# Patient Record
Sex: MALE | Race: WHITE | NOT HISPANIC OR LATINO | URBAN - METROPOLITAN AREA
[De-identification: names, ages, dates, MRNs, and addresses within clinical notes are randomized per-mention and may not be internally consistent; named-entity substitution may affect disease eponyms.]

---

## 2017-11-24 ENCOUNTER — OUTPATIENT (OUTPATIENT)
Dept: OUTPATIENT SERVICES | Facility: HOSPITAL | Age: 27
LOS: 1 days | Discharge: HOME | End: 2017-11-24

## 2017-11-27 DIAGNOSIS — F11.20 OPIOID DEPENDENCE, UNCOMPLICATED: ICD-10-CM

## 2018-03-09 ENCOUNTER — EMERGENCY (EMERGENCY)
Facility: HOSPITAL | Age: 28
LOS: 1 days | Discharge: HOME | End: 2018-03-09
Attending: EMERGENCY MEDICINE | Admitting: EMERGENCY MEDICINE
Payer: MEDICAID

## 2018-03-09 VITALS
HEART RATE: 95 BPM | DIASTOLIC BLOOD PRESSURE: 81 MMHG | HEIGHT: 77 IN | WEIGHT: 190.04 LBS | TEMPERATURE: 98 F | RESPIRATION RATE: 16 BRPM | OXYGEN SATURATION: 100 % | SYSTOLIC BLOOD PRESSURE: 145 MMHG

## 2018-03-09 LAB
ALBUMIN SERPL ELPH-MCNC: 4.4 G/DL — SIGNIFICANT CHANGE UP (ref 3.3–5)
ALP SERPL-CCNC: 53 U/L — SIGNIFICANT CHANGE UP (ref 40–120)
ALT FLD-CCNC: 12 U/L RC — SIGNIFICANT CHANGE UP (ref 10–45)
ANION GAP SERPL CALC-SCNC: 12 MMOL/L — SIGNIFICANT CHANGE UP (ref 5–17)
APTT BLD: 29.7 SEC — SIGNIFICANT CHANGE UP (ref 27.5–37.4)
AST SERPL-CCNC: 19 U/L — SIGNIFICANT CHANGE UP (ref 10–40)
BASE EXCESS BLDV CALC-SCNC: 4.5 MMOL/L — HIGH (ref -2–2)
BASOPHILS # BLD AUTO: 0 K/UL — SIGNIFICANT CHANGE UP (ref 0–0.2)
BASOPHILS NFR BLD AUTO: 0.9 % — SIGNIFICANT CHANGE UP (ref 0–2)
BILIRUB SERPL-MCNC: 0.4 MG/DL — SIGNIFICANT CHANGE UP (ref 0.2–1.2)
BUN SERPL-MCNC: 15 MG/DL — SIGNIFICANT CHANGE UP (ref 7–23)
CA-I SERPL-SCNC: 1.23 MMOL/L — SIGNIFICANT CHANGE UP (ref 1.12–1.3)
CALCIUM SERPL-MCNC: 9.7 MG/DL — SIGNIFICANT CHANGE UP (ref 8.4–10.5)
CHLORIDE BLDV-SCNC: 106 MMOL/L — SIGNIFICANT CHANGE UP (ref 96–108)
CHLORIDE SERPL-SCNC: 101 MMOL/L — SIGNIFICANT CHANGE UP (ref 96–108)
CO2 BLDV-SCNC: 34 MMOL/L — HIGH (ref 22–30)
CO2 SERPL-SCNC: 28 MMOL/L — SIGNIFICANT CHANGE UP (ref 22–31)
CREAT SERPL-MCNC: 0.99 MG/DL — SIGNIFICANT CHANGE UP (ref 0.5–1.3)
EOSINOPHIL # BLD AUTO: 0.3 K/UL — SIGNIFICANT CHANGE UP (ref 0–0.5)
EOSINOPHIL NFR BLD AUTO: 7.2 % — HIGH (ref 0–6)
GAS PNL BLDV: 135 MMOL/L — LOW (ref 136–145)
GAS PNL BLDV: SIGNIFICANT CHANGE UP
GLUCOSE BLDV-MCNC: 89 MG/DL — SIGNIFICANT CHANGE UP (ref 70–99)
GLUCOSE SERPL-MCNC: 98 MG/DL — SIGNIFICANT CHANGE UP (ref 70–99)
HCO3 BLDV-SCNC: 32 MMOL/L — HIGH (ref 21–29)
HCT VFR BLD CALC: 40.5 % — SIGNIFICANT CHANGE UP (ref 39–50)
HCT VFR BLDA CALC: 42 % — SIGNIFICANT CHANGE UP (ref 39–50)
HGB BLD CALC-MCNC: 13.8 G/DL — SIGNIFICANT CHANGE UP (ref 13–17)
HGB BLD-MCNC: 14.1 G/DL — SIGNIFICANT CHANGE UP (ref 13–17)
INR BLD: 0.98 RATIO — SIGNIFICANT CHANGE UP (ref 0.88–1.16)
LACTATE BLDV-MCNC: 1.1 MMOL/L — SIGNIFICANT CHANGE UP (ref 0.7–2)
LIDOCAIN IGE QN: 19 U/L — SIGNIFICANT CHANGE UP (ref 7–60)
LYMPHOCYTES # BLD AUTO: 1.4 K/UL — SIGNIFICANT CHANGE UP (ref 1–3.3)
LYMPHOCYTES # BLD AUTO: 27.8 % — SIGNIFICANT CHANGE UP (ref 13–44)
MCHC RBC-ENTMCNC: 32.6 PG — SIGNIFICANT CHANGE UP (ref 27–34)
MCHC RBC-ENTMCNC: 34.8 GM/DL — SIGNIFICANT CHANGE UP (ref 32–36)
MCV RBC AUTO: 93.8 FL — SIGNIFICANT CHANGE UP (ref 80–100)
MONOCYTES # BLD AUTO: 0.4 K/UL — SIGNIFICANT CHANGE UP (ref 0–0.9)
MONOCYTES NFR BLD AUTO: 7.4 % — SIGNIFICANT CHANGE UP (ref 2–14)
NEUTROPHILS # BLD AUTO: 2.8 K/UL — SIGNIFICANT CHANGE UP (ref 1.8–7.4)
NEUTROPHILS NFR BLD AUTO: 56.7 % — SIGNIFICANT CHANGE UP (ref 43–77)
PCO2 BLDV: 64 MMHG — HIGH (ref 35–50)
PH BLDV: 7.32 — LOW (ref 7.35–7.45)
PLATELET # BLD AUTO: 235 K/UL — SIGNIFICANT CHANGE UP (ref 150–400)
PO2 BLDV: 18 MMHG — LOW (ref 25–45)
POTASSIUM BLDV-SCNC: 4 MMOL/L — SIGNIFICANT CHANGE UP (ref 3.5–5)
POTASSIUM SERPL-MCNC: 4.3 MMOL/L — SIGNIFICANT CHANGE UP (ref 3.5–5.3)
POTASSIUM SERPL-SCNC: 4.3 MMOL/L — SIGNIFICANT CHANGE UP (ref 3.5–5.3)
PROT SERPL-MCNC: 7.5 G/DL — SIGNIFICANT CHANGE UP (ref 6–8.3)
PROTHROM AB SERPL-ACNC: 10.6 SEC — SIGNIFICANT CHANGE UP (ref 9.8–12.7)
RBC # BLD: 4.32 M/UL — SIGNIFICANT CHANGE UP (ref 4.2–5.8)
RBC # FLD: 10.9 % — SIGNIFICANT CHANGE UP (ref 10.3–14.5)
SAO2 % BLDV: 24 % — LOW (ref 67–88)
SODIUM SERPL-SCNC: 141 MMOL/L — SIGNIFICANT CHANGE UP (ref 135–145)
WBC # BLD: 4.8 K/UL — SIGNIFICANT CHANGE UP (ref 3.8–10.5)
WBC # FLD AUTO: 4.8 K/UL — SIGNIFICANT CHANGE UP (ref 3.8–10.5)

## 2018-03-09 PROCEDURE — 80053 COMPREHEN METABOLIC PANEL: CPT

## 2018-03-09 PROCEDURE — 85730 THROMBOPLASTIN TIME PARTIAL: CPT

## 2018-03-09 PROCEDURE — 82803 BLOOD GASES ANY COMBINATION: CPT

## 2018-03-09 PROCEDURE — 82330 ASSAY OF CALCIUM: CPT

## 2018-03-09 PROCEDURE — 71260 CT THORAX DX C+: CPT | Mod: 26

## 2018-03-09 PROCEDURE — 99284 EMERGENCY DEPT VISIT MOD MDM: CPT

## 2018-03-09 PROCEDURE — 85027 COMPLETE CBC AUTOMATED: CPT

## 2018-03-09 PROCEDURE — 84295 ASSAY OF SERUM SODIUM: CPT

## 2018-03-09 PROCEDURE — 74177 CT ABD & PELVIS W/CONTRAST: CPT | Mod: 26

## 2018-03-09 PROCEDURE — 82565 ASSAY OF CREATININE: CPT

## 2018-03-09 PROCEDURE — 85014 HEMATOCRIT: CPT

## 2018-03-09 PROCEDURE — 74177 CT ABD & PELVIS W/CONTRAST: CPT

## 2018-03-09 PROCEDURE — 85610 PROTHROMBIN TIME: CPT

## 2018-03-09 PROCEDURE — 96374 THER/PROPH/DIAG INJ IV PUSH: CPT | Mod: XU

## 2018-03-09 PROCEDURE — 99284 EMERGENCY DEPT VISIT MOD MDM: CPT | Mod: 25

## 2018-03-09 PROCEDURE — 71260 CT THORAX DX C+: CPT

## 2018-03-09 PROCEDURE — 83605 ASSAY OF LACTIC ACID: CPT

## 2018-03-09 PROCEDURE — 83690 ASSAY OF LIPASE: CPT

## 2018-03-09 PROCEDURE — 82947 ASSAY GLUCOSE BLOOD QUANT: CPT

## 2018-03-09 PROCEDURE — 84132 ASSAY OF SERUM POTASSIUM: CPT

## 2018-03-09 PROCEDURE — 82435 ASSAY OF BLOOD CHLORIDE: CPT

## 2018-03-09 RX ORDER — LIDOCAINE 4 G/100G
1 CREAM TOPICAL ONCE
Qty: 0 | Refills: 0 | Status: COMPLETED | OUTPATIENT
Start: 2018-03-09 | End: 2018-03-09

## 2018-03-09 RX ORDER — ACETAMINOPHEN 500 MG
1000 TABLET ORAL ONCE
Qty: 0 | Refills: 0 | Status: COMPLETED | OUTPATIENT
Start: 2018-03-09 | End: 2018-03-09

## 2018-03-09 RX ORDER — SODIUM CHLORIDE 9 MG/ML
1000 INJECTION INTRAMUSCULAR; INTRAVENOUS; SUBCUTANEOUS ONCE
Qty: 0 | Refills: 0 | Status: COMPLETED | OUTPATIENT
Start: 2018-03-09 | End: 2018-03-09

## 2018-03-09 RX ADMIN — LIDOCAINE 1 PATCH: 4 CREAM TOPICAL at 18:46

## 2018-03-09 RX ADMIN — Medication 1000 MILLIGRAM(S): at 18:29

## 2018-03-09 RX ADMIN — SODIUM CHLORIDE 1000 MILLILITER(S): 9 INJECTION INTRAMUSCULAR; INTRAVENOUS; SUBCUTANEOUS at 18:20

## 2018-03-09 RX ADMIN — Medication 400 MILLIGRAM(S): at 18:20

## 2018-03-09 RX ADMIN — LIDOCAINE 1 PATCH: 4 CREAM TOPICAL at 19:26

## 2018-03-09 NOTE — ED PROVIDER NOTE - PHYSICAL EXAMINATION
NAD, VSS, Afebrile, No facial or scalp tender, Known unequal pupils with RRL. No spinal mid tender, + Right posterior rib tender without obvious swelling or lesion, No CVA tender, + Right sided ABD tender, Neuro- intact with steady gait. FUll ROMs of hips without tender.

## 2018-03-09 NOTE — ED ADULT NURSE NOTE - OBJECTIVE STATEMENT
28 y.o M presents to the ED c/o s/p fall two weeks ago. Pt states he was standing on railing outside of house trying to clean out awning and lost balance and fell on R. side of body, about 8 feet. Pt denies LOC/hitting his head and denies feeling dizzy prior to fall. Pt went to urgent care twice, most recently 2 days ago, xray was done and was told he has rib fracture. Pt states urgent care sent him hear for MRI to rule out any traumatic injury Pt states he has R. sided pain, 6/10, to torso and states he has trouble breathing due to pain. Pt took Advil and Tylenol prior to arrival. Pt presents A+OX4, ambulatory, well in appearance, airway intact, no work of breathing noted, l 28 y.o M presents to the ED c/o s/p fall two weeks ago. Pt states he was standing on railing outside of house trying to clean out awning and lost balance and fell on R. side of body, about 8 feet. Pt denies LOC/hitting his head and denies feeling dizzy prior to fall. Pt went to urgent care twice, most recently 2 days ago, xray was done and was told he has rib fracture. Pt states urgent care sent him hear for MRI to rule out any traumatic injury Pt states he has R. sided pain, 6/10, to torso and states he has trouble breathing due to pain. Pt took Advil and Tylenol prior to arrival. Pt presents A+OX4, ambulatory, well in appearance, airway intact, no work of breathing noted, lung sounds clear bilaterally, gross neuro intact, no obvious deformity/bleeding/ecchymosis to right side of body, pt denies numbness/tingling to extremities, ha, n/v, dizziness. MD at bedside for eval.

## 2018-03-09 NOTE — ED PROVIDER NOTE - ATTENDING CONTRIBUTION TO CARE
29 yo M p/w right flank chest and abdominal pain s/p fall from 8 feet; xray at urgent care showing 9th rib fracture; on exam in NAD, lungs CTABL, Cardiac: s1/s2 no MGR, abd soft, TTP in RLQ.  R flank/lateral chest wall tenderness.   No splinting or respiratory distress, hemodynamically stable.  CT chest/abdomen obtained, showing only singular rigth 9th rib fracture, no other acute injury; patient given incentive spirometer and instructed on use, stable for dc with nsaids, outpatient f/u.

## 2018-03-09 NOTE — ED PROVIDER NOTE - OBJECTIVE STATEMENT
27yo male pt, no PMHx c/o right sided rib/ abd pain s/p fall 2weeks ago. Pt stated he was standing on railing outside of house trying to clean out awning and lost balance and fell/sliding down on right sided rib from the railing, about 8 feet. Pt's evaluated by UC twice (2/28 and 3/7). 27yo male pt, no PMHx c/o right sided rib/ abd pain s/p fall 2weeks ago. Pt stated he was standing on railing outside of house trying to clean out awning and lost balance and fell/sliding down on right sided rib from the railing, about 8 feet. Pt's evaluated by UC twice (2/28 and 3/7). Pt stated the pain's worsen with intermittent SOB. Denies head injury or LOC. Denies headache, neck or low back pain. Denies N/V/D. Denies sensory changes or weakness to extremities. Denies fever, chills or cough. 29yo male pt, no PMHx c/o right sided rib/ abd pain s/p fall 2weeks ago. Pt stated he was standing on railing outside of house trying to clean out awning and lost balance and fell/sliding down on right sided rib from the railing, about 8 feet. Pt's evaluated by UC twice (2/28 and 3/7). Pt stated the pain's worsen with intermittent SOB. Denies head injury or LOC. Denies headache, neck or low back pain. Denies N/V/D. Denies sensory changes or weakness to extremities. Denies urinary problems or hematuria. Denies fever, chills or cough.

## 2018-03-09 NOTE — ED PROVIDER NOTE - MEDICAL DECISION MAKING DETAILS
27 yo M p/w right flank chest and abdominal pain s/p fall from 8 feet; xray at urgent care showing 9th rib fracture  Plan: CT chest/abdomen

## 2022-07-12 ENCOUNTER — INPATIENT (INPATIENT)
Facility: HOSPITAL | Age: 32
LOS: 9 days | Discharge: HOME | End: 2022-07-22
Attending: PSYCHIATRY & NEUROLOGY | Admitting: PSYCHIATRY & NEUROLOGY

## 2022-07-12 VITALS
OXYGEN SATURATION: 98 % | RESPIRATION RATE: 18 BRPM | DIASTOLIC BLOOD PRESSURE: 100 MMHG | TEMPERATURE: 99 F | HEIGHT: 77 IN | SYSTOLIC BLOOD PRESSURE: 190 MMHG | HEART RATE: 80 BPM

## 2022-07-12 DIAGNOSIS — F29 UNSPECIFIED PSYCHOSIS NOT DUE TO A SUBSTANCE OR KNOWN PHYSIOLOGICAL CONDITION: ICD-10-CM

## 2022-07-12 DIAGNOSIS — F12.10 CANNABIS ABUSE, UNCOMPLICATED: ICD-10-CM

## 2022-07-12 LAB
ALBUMIN SERPL ELPH-MCNC: 4.8 G/DL — SIGNIFICANT CHANGE UP (ref 3.3–5)
ALP SERPL-CCNC: 69 U/L — SIGNIFICANT CHANGE UP (ref 40–120)
ALT FLD-CCNC: 12 U/L — SIGNIFICANT CHANGE UP (ref 4–41)
AMPHET UR-MCNC: NEGATIVE — SIGNIFICANT CHANGE UP
ANION GAP SERPL CALC-SCNC: 12 MMOL/L — SIGNIFICANT CHANGE UP (ref 7–14)
APPEARANCE UR: CLEAR — SIGNIFICANT CHANGE UP
AST SERPL-CCNC: 16 U/L — SIGNIFICANT CHANGE UP (ref 4–40)
BARBITURATES UR SCN-MCNC: NEGATIVE — SIGNIFICANT CHANGE UP
BASOPHILS # BLD AUTO: 0.03 K/UL — SIGNIFICANT CHANGE UP (ref 0–0.2)
BASOPHILS NFR BLD AUTO: 0.4 % — SIGNIFICANT CHANGE UP (ref 0–2)
BENZODIAZ UR-MCNC: POSITIVE
BILIRUB SERPL-MCNC: 0.6 MG/DL — SIGNIFICANT CHANGE UP (ref 0.2–1.2)
BILIRUB UR-MCNC: NEGATIVE — SIGNIFICANT CHANGE UP
BUN SERPL-MCNC: 10 MG/DL — SIGNIFICANT CHANGE UP (ref 7–23)
CALCIUM SERPL-MCNC: 10.1 MG/DL — SIGNIFICANT CHANGE UP (ref 8.4–10.5)
CHLORIDE SERPL-SCNC: 104 MMOL/L — SIGNIFICANT CHANGE UP (ref 98–107)
CO2 SERPL-SCNC: 25 MMOL/L — SIGNIFICANT CHANGE UP (ref 22–31)
COCAINE METAB.OTHER UR-MCNC: NEGATIVE — SIGNIFICANT CHANGE UP
COLOR SPEC: YELLOW — SIGNIFICANT CHANGE UP
CREAT SERPL-MCNC: 0.81 MG/DL — SIGNIFICANT CHANGE UP (ref 0.5–1.3)
CREATININE URINE RESULT, DAU: 368 MG/DL — SIGNIFICANT CHANGE UP
DIFF PNL FLD: NEGATIVE — SIGNIFICANT CHANGE UP
EGFR: 119 ML/MIN/1.73M2 — SIGNIFICANT CHANGE UP
EOSINOPHIL # BLD AUTO: 0.02 K/UL — SIGNIFICANT CHANGE UP (ref 0–0.5)
EOSINOPHIL NFR BLD AUTO: 0.3 % — SIGNIFICANT CHANGE UP (ref 0–6)
GLUCOSE SERPL-MCNC: 131 MG/DL — HIGH (ref 70–99)
GLUCOSE UR QL: NEGATIVE — SIGNIFICANT CHANGE UP
HCT VFR BLD CALC: 42.9 % — SIGNIFICANT CHANGE UP (ref 39–50)
HGB BLD-MCNC: 14.5 G/DL — SIGNIFICANT CHANGE UP (ref 13–17)
IANC: 5.48 K/UL — SIGNIFICANT CHANGE UP (ref 1.8–7.4)
IMM GRANULOCYTES NFR BLD AUTO: 0.1 % — SIGNIFICANT CHANGE UP (ref 0–1.5)
KETONES UR-MCNC: ABNORMAL
LEUKOCYTE ESTERASE UR-ACNC: NEGATIVE — SIGNIFICANT CHANGE UP
LYMPHOCYTES # BLD AUTO: 1.22 K/UL — SIGNIFICANT CHANGE UP (ref 1–3.3)
LYMPHOCYTES # BLD AUTO: 17.1 % — SIGNIFICANT CHANGE UP (ref 13–44)
MAGNESIUM SERPL-MCNC: 2.1 MG/DL — SIGNIFICANT CHANGE UP (ref 1.6–2.6)
MCHC RBC-ENTMCNC: 30.9 PG — SIGNIFICANT CHANGE UP (ref 27–34)
MCHC RBC-ENTMCNC: 33.8 GM/DL — SIGNIFICANT CHANGE UP (ref 32–36)
MCV RBC AUTO: 91.3 FL — SIGNIFICANT CHANGE UP (ref 80–100)
METHADONE UR-MCNC: NEGATIVE — SIGNIFICANT CHANGE UP
MONOCYTES # BLD AUTO: 0.38 K/UL — SIGNIFICANT CHANGE UP (ref 0–0.9)
MONOCYTES NFR BLD AUTO: 5.3 % — SIGNIFICANT CHANGE UP (ref 2–14)
NEUTROPHILS # BLD AUTO: 5.48 K/UL — SIGNIFICANT CHANGE UP (ref 1.8–7.4)
NEUTROPHILS NFR BLD AUTO: 76.8 % — SIGNIFICANT CHANGE UP (ref 43–77)
NITRITE UR-MCNC: NEGATIVE — SIGNIFICANT CHANGE UP
NRBC # BLD: 0 /100 WBCS — SIGNIFICANT CHANGE UP
NRBC # FLD: 0 K/UL — SIGNIFICANT CHANGE UP
OPIATES UR-MCNC: NEGATIVE — SIGNIFICANT CHANGE UP
OXYCODONE UR-MCNC: NEGATIVE — SIGNIFICANT CHANGE UP
PCP SPEC-MCNC: SIGNIFICANT CHANGE UP
PCP UR-MCNC: NEGATIVE — SIGNIFICANT CHANGE UP
PH UR: 6.5 — SIGNIFICANT CHANGE UP (ref 5–8)
PLATELET # BLD AUTO: 315 K/UL — SIGNIFICANT CHANGE UP (ref 150–400)
POTASSIUM SERPL-MCNC: 4 MMOL/L — SIGNIFICANT CHANGE UP (ref 3.5–5.3)
POTASSIUM SERPL-SCNC: 4 MMOL/L — SIGNIFICANT CHANGE UP (ref 3.5–5.3)
PROT SERPL-MCNC: 7.5 G/DL — SIGNIFICANT CHANGE UP (ref 6–8.3)
PROT UR-MCNC: ABNORMAL
RBC # BLD: 4.7 M/UL — SIGNIFICANT CHANGE UP (ref 4.2–5.8)
RBC # FLD: 12.6 % — SIGNIFICANT CHANGE UP (ref 10.3–14.5)
SODIUM SERPL-SCNC: 141 MMOL/L — SIGNIFICANT CHANGE UP (ref 135–145)
SP GR SPEC: 1.03 — SIGNIFICANT CHANGE UP (ref 1–1.05)
THC UR QL: POSITIVE
TOXICOLOGY SCREEN, DRUGS OF ABUSE, SERUM RESULT: SIGNIFICANT CHANGE UP
TSH SERPL-MCNC: 0.88 UIU/ML — SIGNIFICANT CHANGE UP (ref 0.27–4.2)
UROBILINOGEN FLD QL: SIGNIFICANT CHANGE UP
WBC # BLD: 7.14 K/UL — SIGNIFICANT CHANGE UP (ref 3.8–10.5)
WBC # FLD AUTO: 7.14 K/UL — SIGNIFICANT CHANGE UP (ref 3.8–10.5)

## 2022-07-12 PROCEDURE — 93010 ELECTROCARDIOGRAM REPORT: CPT

## 2022-07-12 PROCEDURE — 99285 EMERGENCY DEPT VISIT HI MDM: CPT | Mod: 25

## 2022-07-12 RX ORDER — MIDAZOLAM HYDROCHLORIDE 1 MG/ML
5 INJECTION, SOLUTION INTRAMUSCULAR; INTRAVENOUS ONCE
Refills: 0 | Status: DISCONTINUED | OUTPATIENT
Start: 2022-07-12 | End: 2022-07-12

## 2022-07-12 RX ADMIN — MIDAZOLAM HYDROCHLORIDE 5 MILLIGRAM(S): 1 INJECTION, SOLUTION INTRAMUSCULAR; INTRAVENOUS at 17:45

## 2022-07-12 NOTE — ED ADULT TRIAGE NOTE - CHIEF COMPLAINT QUOTE
Pt brought in by EMS from home for psych eval, has been having scattered thoughts. Pt denies SI/HI. Pt takes opioids daily, has not been had any in 3 days. Pt states he took Suboxone 14 hours ago. Pt noted to have tremors
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
## 2022-07-12 NOTE — ED PROVIDER NOTE - NS ED ROS FT
Gen: Denies fever.   HEENT: Denies headache. Denies congestion.  CV: Denies chest pain. Denies lightheadedness.  Skin: Denies rash.   Resp: Denies SOB. Denies cough.  GI: Denies nausea. Denies vomiting. Denies diarrhea. Denies melena. Denies hematochezia.  Msk: Denies extremity swelling. Denies extremity pain.  : Denies dysuria. Denies hematuria.  Neuro: Denies LOC. Denies dizziness. Denies new numbness/tingling. Denies new focal weakness.  Psych: Denies SI

## 2022-07-12 NOTE — ED PROVIDER NOTE - PROGRESS NOTE DETAILS
Caleb Gordon, DO: was assisting in ambay when pt began to become acutely agitated and lunging at pts and staff. did not respond to verbal de-escalation. required restraint by security and iv meds. pt still agitated after however improved. no s/s moderation sedation. will go to main ED asap. O'Aubrey DO PGY-3: received sign out on this patient. Mc TALAVERA: Signed out to be by overnight team pending bed placement for [facility]. Patient was signed out already being medically cleared. Mc TALAVERA: Admit to [facility].
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
## 2022-07-12 NOTE — ED BEHAVIORAL HEALTH ASSESSMENT NOTE - RISK ASSESSMENT
Moderate Acute Suicide Risk Pt is at moderate risk of suicide at this time. Static risk factors include hx of substance use, male sex. Dynamic risk factors include current substance use, unemployment, not currently in outpatient treatment, paranoia. Protective factors include supportive family, residential stability, no current SI.
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
## 2022-07-12 NOTE — ED BEHAVIORAL HEALTH ASSESSMENT NOTE - SUMMARY
The patient is a 32 year old man, domiciled with mother in a private residence, unemployed, hx of substance use (reports suboxone use, Percocet weekly), hx of seeing a psychiatrist (1-2 years ago) though none currently, not previously on medications, denies any inpatient psychiatric admissions, denies hx of SAs/SIB who presents via EMS for bizarre behavior. On initial assessment, pt with paranoid delusions, impaired reasoning, poor insight. Denies current SI/HI/AVH. UDS positive for benzos (though likely taken after Versed given) and THC. At this time, pt's presentation likely 2/2 underlying substance-induced psychotic disorder vs. primary psychotic disorder. Pt appears unable to keep himself safe at this time and will require inpatient psychiatric admission.    Plan:  -Legal: Admit 9.27  -Psychiatric: Start standing risperidone 1 mg qhs for psychosis. PRNs: Haldol 5/Ativan 2 mg PO/IV/IM q6h for agitation  -Medical: none, pending CT scan, COVID   -I/G/m therapy on unit when appropriate The patient is a 32 year old man, domiciled with mother in a private residence, unemployed, hx of substance use (reports suboxone use, Percocet weekly), hx of seeing a psychiatrist (1-2 years ago) though none currently, not previously on medications, denies any inpatient psychiatric admissions, denies hx of SAs/SIB who presents via EMS for bizarre behavior. On initial assessment, pt with paranoid delusions, impaired reasoning, poor insight. Denies current SI/HI/AVH. UDS positive for benzos (though likely taken after Versed given) and THC. At this time, pt's presentation likely 2/2 underlying substance-induced psychotic disorder vs. primary psychotic disorder. Pt appears unable to keep himself safe at this time and will require inpatient psychiatric admission.    Plan:  -Legal: Admit 9.27  -Psychiatric: Start standing risperidone 1 mg qhs for psychosis. Start symptom-triggered CIWA (unknown recent alcohol use) and CINA protocol due to recent opiate use.  PRNs: Haldol 5/Ativan 2 mg PO/IV/IM q6h for agitation  -Medical: none, pending head CT scan, COVID pending. The patient is a 32 year old man, domiciled with mother in a private residence, unemployed, hx of substance use (reports suboxone use, Percocet weekly), hx of seeing a psychiatrist (1-2 years ago) though none currently, not previously on medications, denies any inpatient psychiatric admissions, denies hx of SAs/SIB who presents via EMS for bizarre behavior. On initial assessment, pt with paranoid delusions, impaired reasoning, poor insight. Denies current SI/HI/AVH. UDS positive for benzos (though likely taken after Versed given) and THC. At this time, pt's presentation likely 2/2 underlying substance-induced psychotic disorder vs. primary psychotic disorder. Pt appears unable to keep himself safe at this time and will require inpatient psychiatric admission.    Plan:  -Legal: Admit 9.27  -Psychiatric: Start standing risperidone 1 mg qhs for psychosis. Start symptom-triggered CIWA (unknown recent alcohol use) and CINA protocol due to recent opiate use.  PRNs: Haldol 5/Ativan 2 mg PO/IV/IM q6h for severe agitation. Atarax 25 mg PO q6h for anxiety. Would minimize benzodiazepine use given substance use hx.   -Medical: none, pending head CT scan, COVID pending. The patient is a 32 year old man, domiciled with mother in a private residence, unemployed, hx of substance use (reports suboxone use, Percocet weekly), hx of seeing a psychiatrist (1-2 years ago) though none currently, not previously on medications, denies any inpatient psychiatric admissions, denies hx of SAs/SIB who presents via EMS for bizarre behavior. On initial assessment, pt with paranoid delusions, impaired reasoning, poor insight. Denies current SI/HI/AVH. UDS positive for benzos (though likely taken after Versed given) and THC. At this time, pt's presentation likely 2/2 underlying substance-induced psychotic disorder vs. primary psychotic disorder. Pt appears unable to keep himself safe at this time and will require inpatient psychiatric admission.    Plan:  -Legal: Admit 9.27  -Psychiatric: Start standing risperidone 1 mg qhs for psychosis. Start symptom-triggered CIWA (unknown recent alcohol use) and CINA protocol due to recent opiate use.  PRNs: Haldol 5/Ativan 2 mg PO/IV/IM q6h for severe agitation. Atarax 25 mg PO q6h for anxiety. Would minimize benzodiazepine use given substance use hx. The patient is a 32 year old man, domiciled with mother in a private residence, unemployed, hx of substance use (reports suboxone use, Percocet weekly), hx of seeing a psychiatrist (1-2 years ago) though none currently, not previously on medications, denies any inpatient psychiatric admissions, denies hx of SAs/SIB who presents via EMS for bizarre behavior.     On initial assessment, pt with paranoid delusions, impaired reasoning, poor insight. Denies current SI/HI/AVH. UDS positive for benzos (though likely taken after Versed given) and THC. At this time, pt's presentation likely 2/2 underlying substance-induced psychotic disorder vs. primary psychotic disorder. Pt appears unable to keep himself safe at this time and will require inpatient psychiatric admission.    Plan:  -Legal: Admit 9.27  -Psychiatric: Start standing risperidone 1 mg qhs for psychosis. Start symptom-triggered CIWA (unknown recent alcohol use) and CINA protocol due to recent opiate use.  PRNs: Haldol 5/Ativan 2 mg PO/IV/IM q6h for severe agitation. Atarax 25 mg PO q6h for anxiety. Would minimize benzodiazepine use given substance use hx.
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
## 2022-07-12 NOTE — ED BEHAVIORAL HEALTH ASSESSMENT NOTE - PSYCHIATRIC ISSUES AND PLAN (INCLUDE STANDING AND PRN MEDICATION)
Start risperidone 1 mg qhs for psychosis. PRNs: Haldol 5/Ativan 2 mg PO/IM/IV q6h Start risperidone 1 mg qhs for psychosis. PRNs: Haldol 5/Ativan 2 mg PO/IM/IV q6h. Atarax 25 mg PO for anxiety PRN Start risperidone 1 mg qhs for psychosis. Monitor QTc < 500 on EKG. PRNs: Haldol 5/Ativan 2 mg PO/IM/IV q6h. Atarax 25 mg PO for anxiety PRN

## 2022-07-12 NOTE — ED PROVIDER NOTE - PHYSICAL EXAMINATION
Gen: A&Ox4   HEENT: Atraumatic. Mucous membranes moist, no scleral icterus.  CV: RRR. No significant lower extremity edema.   Resp: Respirations unlabored. CTAB, no rales, no wheezes.  GI: Abdomen non tender to palpation, soft and non-distended.   Skin/MSK: No open wounds. No ecchymosis appreciated. No evidence of thoracic trauma - non ttp throughout and no bruising/deformity. Spine non ttp in midline throughout. Extremities w/o wounds/ecchymosis/deformity x 4. Distal pulses 2+ throughout.   Neuro: Following commands. EOMI. Pupils ERRL.  Psych: Appropriate mood, cooperative

## 2022-07-12 NOTE — ED BEHAVIORAL HEALTH ASSESSMENT NOTE - DESCRIPTION
none domiciled at home with mother, unemployed Pt agitated, requiring Versed 5 mg IV push.    Vital Signs Last 24 Hrs  T(C): 37.1 (12 Jul 2022 21:16), Max: 37.1 (12 Jul 2022 14:24)  T(F): 98.8 (12 Jul 2022 21:16), Max: 98.8 (12 Jul 2022 21:16)  HR: 77 (12 Jul 2022 21:16) (77 - 98)  BP: 123/80 (12 Jul 2022 21:16) (111/77 - 190/100)  BP(mean): --  RR: 16 (12 Jul 2022 21:16) (16 - 18)  SpO2: 100% (12 Jul 2022 21:16) (98% - 100%)    Parameters below as of 12 Jul 2022 21:16  Patient On (Oxygen Delivery Method): room air                              14.5   7.14  )-----------( 315      ( 12 Jul 2022 15:48 )             42.9

## 2022-07-12 NOTE — ED BEHAVIORAL HEALTH ASSESSMENT NOTE - OTHER PAST PSYCHIATRIC HISTORY (INCLUDE DETAILS REGARDING ONSET, COURSE OF ILLNESS, INPATIENT/OUTPATIENT TREATMENT)
Pt reports once being brought to the ED for alcohol use  He states he saw a psychiatrist 1-2 years ago  never on psychiatric medications

## 2022-07-12 NOTE — ED BEHAVIORAL HEALTH NOTE - BEHAVIORAL HEALTH NOTE
COVID Exposure Screen- Patient  1.	*Have you had a COVID-19 test in the last 90 days?  (  ) Yes   ( x ) No   (  ) Unknown- Reason: _____  IF YES PROCEED TO QUESTION #2. IF NO OR UNKNOWN, PLEASE SKIP TO QUESTION #3.  2.	Date of test(s) and result(s): ________  3.	*Have you tested positive for COVID-19 antibodies? (  ) Yes   (  x ) No   (  ) Unknown- Reason: _____  IF YES PROCEED TO QUESTION #4. IF NO or UNKNOWN, PLEASE SKIP TO QUESTION #5.  4.	Date of positive antibody test: ________  5.	*Have you received 2 doses of the COVID-19 vaccine? ( x ) Yes   (  ) No   (  ) Unknown- Reason: _____   IF YES PROCEED TO QUESTION #6. IF NO or UNKNOWN, PLEASE SKIP TO QUESTION #7.  6.	Date of second dose: ________  7.	*In the past 10 days, have you been around anyone with a positive COVID-19 test?* (  ) Yes   ( x ) No   (  ) Unknown- Reason: ____  IF YES PROCEED TO QUESTION #8. IF NO or UNKNOWN, PLEASE SKIP TO QUESTION #13.  8.	Were you within 6 feet of them for at least 15 minutes? (  ) Yes   (  ) No   (  ) Unknown- Reason: _____  9.	Have you provided care for them? (  ) Yes   (  ) No   (  ) Unknown- Reason: ______  10.	Have you had direct physical contact with them (touched, hugged, or kissed them)? (  ) Yes   (  ) No    (  ) Unknown- Reason: _____  11.	Have you shared eating or drinking utensils with them? (  ) Yes   (  ) No    (  ) Unknown- Reason: ____  12.	Have they sneezed, coughed, or somehow gotten respiratory droplets on you? (  ) Yes   (  ) No    (  ) Unknown- Reason: ______  13.	*Have you been out of [place] within the past 10 days?* (  ) Yes   ( x ) No   (  ) Unknown- Reason: _____  IF YES PLEASE ANSWER THE FOLLOWING QUESTIONS:  14.	Which state/country have you been to? ______  15.	Were you there over 24 hours? (  ) Yes   (  ) No    (  ) Unknown- Reason: ______  16.	Date of return to [facility]: ______

## 2022-07-12 NOTE — ED BEHAVIORAL HEALTH ASSESSMENT NOTE - NSBHATTESTCOMMENTATTENDFT_PSY_A_CORE
The patient is a 32 year old man, domiciled with mother in a private residence, unemployed, hx of substance use (reports suboxone use, Percocet weekly), hx of seeing a psychiatrist (1-2 years ago) though none currently, not previously on medications, denies any inpatient psychiatric admissions, denies hx of SAs/SIB who presents via EMS for bizarre behavior.  Pt in the ED, agitated, requiring multiple rounds of PRNs. He is presenting with symptoms of paranoia, possible AH and disorganized thought process. Pt at this time cannot care for himself, is not functioning at his baseline and will require psych hospitalization for stabilization.
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
## 2022-07-12 NOTE — ED PROVIDER NOTE - CLINICAL SUMMARY MEDICAL DECISION MAKING FREE TEXT BOX
33 yo M PMHx of multiple head injuries, opiate dependence (percocet), on Suboxone, presenting to ED via EMS for psychiatric disturbance. Sister reporting strange behavior w/ pt crawling on floor and not making sense. Pt reporting scattered thoughts and recurrence of dream, prompting his interest in being evaluated in the ER. He reports marijuana use and Percocet use regularly, no other substance abuse per pt. Pt describing recent trip to [place] during interview, but timeline for this unclear. He denies hx of psychiatric illness. Denies SI/HI, VH/AH. PMHx limited 2/2 to tangential thought processes and perseverance on his treatment at time of ED arrival. Exam as above. Concern for psychosis, substance induced vs primary psychiatric disorder. Consider metabolic etiology. Psychiatric screening labs pending. Pt appearing paranoid and mildly agitated but compliant w/ examination. S/p versed on arrival due to agitation. Will reassess after screen diagnostics.

## 2022-07-12 NOTE — ED PROVIDER NOTE - ATTENDING CONTRIBUTION TO CARE
I have personally performed a face to face medical and diagnostic evaluation of the patient. I have discussed with and reviewed the Resident's note and agree with the History, ROS, Physical Exam and MDM unless otherwise indicated. A brief summary of my personal evaluation and impression can be found below.    33 yo M PMHx of multiple head injuries, opiate dependence (percocet), on Suboxone, presenting to ED via EMS for psychiatric disturbance. Collateral gathered from sister - states that he normally lives with their mother - mother currently in [place]. She lives across the street, pt called her and wasn't making any sense. She went over - could see inside through locked screen door and he was hunched over/crawling saying things about emily implanting things in his abdomen. She has not seen him this bad before. Pt currently endorses "vivid" dreams and states that people sutured things in to his abdomen. Unable to provide any further information.     On exam, VSS w no signs of trauma on exam, soft nontender abd. Incoherent statements during exam. Will obtain psych clearance labs and head CT. Will consult psych.     Reassess to dispo.     Jordan Moody, ED Attending

## 2022-07-13 DIAGNOSIS — F33.9 MAJOR DEPRESSIVE DISORDER, RECURRENT, UNSPECIFIED: ICD-10-CM

## 2022-07-13 LAB — SARS-COV-2 RNA SPEC QL NAA+PROBE: SIGNIFICANT CHANGE UP

## 2022-07-13 PROCEDURE — 99222 1ST HOSP IP/OBS MODERATE 55: CPT | Mod: GC

## 2022-07-13 PROCEDURE — 70450 CT HEAD/BRAIN W/O DYE: CPT | Mod: 26,MA

## 2022-07-13 RX ORDER — DIPHENHYDRAMINE HCL 50 MG
50 CAPSULE ORAL ONCE
Refills: 0 | Status: DISCONTINUED | OUTPATIENT
Start: 2022-07-13 | End: 2022-07-14

## 2022-07-13 RX ORDER — HALOPERIDOL DECANOATE 100 MG/ML
5 INJECTION INTRAMUSCULAR EVERY 4 HOURS
Refills: 0 | Status: DISCONTINUED | OUTPATIENT
Start: 2022-07-13 | End: 2022-07-14

## 2022-07-13 RX ORDER — HYDROXYZINE HCL 10 MG
50 TABLET ORAL EVERY 4 HOURS
Refills: 0 | Status: DISCONTINUED | OUTPATIENT
Start: 2022-07-13 | End: 2022-07-14

## 2022-07-13 RX ORDER — NICOTINE POLACRILEX 2 MG
4 GUM BUCCAL
Refills: 0 | Status: DISCONTINUED | OUTPATIENT
Start: 2022-07-13 | End: 2022-07-19

## 2022-07-13 RX ORDER — DIPHENHYDRAMINE HCL 50 MG
50 CAPSULE ORAL EVERY 4 HOURS
Refills: 0 | Status: DISCONTINUED | OUTPATIENT
Start: 2022-07-13 | End: 2022-07-14

## 2022-07-13 RX ORDER — RISPERIDONE 4 MG/1
1 TABLET ORAL AT BEDTIME
Refills: 0 | Status: DISCONTINUED | OUTPATIENT
Start: 2022-07-13 | End: 2022-07-14

## 2022-07-13 RX ORDER — HALOPERIDOL DECANOATE 100 MG/ML
5 INJECTION INTRAMUSCULAR ONCE
Refills: 0 | Status: DISCONTINUED | OUTPATIENT
Start: 2022-07-13 | End: 2022-07-14

## 2022-07-13 RX ORDER — LOPERAMIDE HCL 2 MG
2 TABLET ORAL EVERY 4 HOURS
Refills: 0 | Status: DISCONTINUED | OUTPATIENT
Start: 2022-07-13 | End: 2022-07-22

## 2022-07-13 RX ADMIN — Medication 2 MILLIGRAM(S): at 15:28

## 2022-07-13 RX ADMIN — Medication 50 MILLIGRAM(S): at 15:27

## 2022-07-13 RX ADMIN — HALOPERIDOL DECANOATE 5 MILLIGRAM(S): 100 INJECTION INTRAMUSCULAR at 22:16

## 2022-07-13 RX ADMIN — Medication 2 MILLIGRAM(S): at 00:40

## 2022-07-13 RX ADMIN — Medication 50 MILLIGRAM(S): at 20:12

## 2022-07-13 RX ADMIN — HALOPERIDOL DECANOATE 5 MILLIGRAM(S): 100 INJECTION INTRAMUSCULAR at 15:28

## 2022-07-13 RX ADMIN — Medication 2 MILLIGRAM(S): at 17:14

## 2022-07-13 RX ADMIN — Medication 2 MILLIGRAM(S): at 09:51

## 2022-07-13 RX ADMIN — RISPERIDONE 1 MILLIGRAM(S): 4 TABLET ORAL at 20:12

## 2022-07-13 RX ADMIN — Medication 50 MILLIGRAM(S): at 22:16

## 2022-07-13 RX ADMIN — Medication 2 MILLIGRAM(S): at 20:13

## 2022-07-13 NOTE — BH CONSULTATION LIAISON PROGRESS NOTE - NSBHATTESTCOMMENTATTENDFT_PSY_A_CORE
The patient is a 32 year old man, domiciled with mother in a private residence, unemployed, hx of substance use (reports suboxone use, Percocet weekly), hx of seeing a psychiatrist (1-2 years ago) though none currently, not previously on medications, denies any inpatient psychiatric admissions, denies hx of SAs/SIB who presents via EMS for bizarre behavior. On initial assessment, pt with paranoid delusions, impaired reasoning, poor insight. Denies current SI/HI/AVH. UDS positive for benzos (though likely taken after Versed given) and THC. At this time, pt's presentation likely 2/2 underlying substance-induced psychotic disorder vs. primary psychotic disorder. Pt appears unable to keep himself safe at this time and will require inpatient psychiatric admission.    On reassessment, patient continues to demonstrate paranoid delusions, impaired reasoning, labile affect, and symptoms concerning for possible mild EtOH w/d, currently managed with symptom-triggered CIWA. Continues to merit inpatient psychiatric hospitalization for inability to care for self.

## 2022-07-13 NOTE — BH INPATIENT PSYCHIATRY ASSESSMENT NOTE - MSE UNSTRUCTURED FT
TT is a cis man wearing hospital gowns. He is adequately groomed. He was cooperative during the interview with good eye contact, but became agitated and anxious at times. He was sitting on his bed comfortably at times and animated at times, swinging his arms and throwing his mask. He speaks at a pressured rate with normative volume. His mood is "bad." Affect is congruent, labile, at times constricted and intense. His thought process exhibits flight of ideas and global disorganization but can speak linearly at times. His thought content was focused on persecutory delusions. Denies SIIP and HIIP. Displayed and described overt paranoia and persecutory fears. He denies AH and VH. Insight is poor. Judgment is limited. Impulse control was appropriate but likely tenuous given overt persecutory fears.

## 2022-07-13 NOTE — BH INPATIENT PSYCHIATRY ASSESSMENT NOTE - NSBHASSESSSUMMFT_PSY_ALL_CORE
The patient is a 32 year old man with a history of polysubstance use disorder (Percocet, Xanax, marijuana) presenting with paranoid psychosis of unknown etiology.     On exam, the patient continues to be disorganized and overtly paranoid. He is highly labile and anxious. Due to inability to care for self and potential threat to the community, he warrants inpatient admission for stabilization. Differential includes manic psychosis, substance-induced psychosis, and first-break schizophrenia.     Plan:  -	continue risperidone 1mg qhs  -	continue symptom-triggered CIWA and CINA  -	continue hydroxyzine 25mg q6h for anxiety The patient is a 32 year old man with a history of polysubstance use disorder (Percocet, Xanax, marijuana) presenting with paranoid psychosis in the context of substance use (THC, percocet, possibly Suboxone, possibly xanax) and lack of outpt treatment.     On exam, the patient continues to be disorganized and overtly paranoid. He is highly labile and anxious. Due to inability to care for self and potential threat to the community, he warrants inpatient admission for stabilization. Aside from severe anxiety and restlessness, pt at this time without any symptoms of EtOH/benzo or opioid withdrawal and vitals are noted to be stable. Differential diagnoses include substance-induced psychosis vs first episode psychosis vs Bipolar I Disorder (current episode manic with psychosis).     Plan:  - Admit to [facility], involuntary 2PC legal status  - Routine checks  - C/w Risperdal 1mg qhs (started in the ED)  - CIWA protocol with prn Ativan  - CINA protocol   - Admission labs and EKG reviewed, within normal limits

## 2022-07-13 NOTE — ED ADULT NURSE REASSESSMENT NOTE - NS ED NURSE REASSESS COMMENT FT1
Pt arrived to  from main ED. Pt wanded for safety, changed into  clothing, personal property collected and logged. Pt irritable, anxious, asking to leave. Pt provided with ativan 2mg po. Pt agreeable to CT head scan. Vitals recorded, pt placed in  room 1, comfort measures provided.

## 2022-07-13 NOTE — ED ADULT NURSE REASSESSMENT NOTE - NS ED NURSE REASSESS COMMENT FT1
Pt remains awake, a&ox3, increasingly anxious, restless, pacing and repeatedly asking to leave ED. Pt medicated as ordered with Ativan 2mg PO. Psychiatrist at bedside for reassessment. Will continue to monitor for safety and therapeutic effect.

## 2022-07-13 NOTE — BH CONSULTATION LIAISON PROGRESS NOTE - NSBHASSESSMENTFT_PSY_ALL_CORE
The patient is a 32 year old man, domiciled with mother in a private residence, unemployed, hx of substance use (reports suboxone use, Percocet weekly), hx of seeing a psychiatrist (1-2 years ago) though none currently, not previously on medications, denies any inpatient psychiatric admissions, denies hx of SAs/SIB who presents via EMS for bizarre behavior. On initial assessment, pt with paranoid delusions, impaired reasoning, poor insight. Denies current SI/HI/AVH. UDS positive for benzos (though likely taken after Versed given) and THC. At this time, pt's presentation likely 2/2 underlying substance-induced psychotic disorder vs. primary psychotic disorder. Pt appears unable to keep himself safe at this time and will require inpatient psychiatric admission.    On reassessment, patient continues to demonstrate paranoid delusions, impaired reasoning, labile affect, and symptoms concerning for possible mild EtOH w/d, currently managed with symptom-triggered CIWA. Continues to merit inpatient psychiatric hospitalization for inability to care for self.    Plan:  -Legal: Admit 9.27  -Psychiatric: Start standing risperidone 1 mg qhs for psychosis. Start symptom-triggered CIWA (unknown recent alcohol use) and CINA protocol due to recent opiate use.  PRNs: Haldol 5/Ativan 2 mg PO/IV/IM q6h for severe agitation. Atarax 25 mg PO q6h for anxiety. Would minimize benzodiazepine use given substance use hx.   -Medical: none, CTHead wnl, COVID (-).

## 2022-07-13 NOTE — BH INPATIENT PSYCHIATRY ASSESSMENT NOTE - NSBHCRANIAL_PSY_ALL_CORE
Recognizes 2 fingers or can read (II)/Normal speech (IX, X, XII)/Hearing intact (VIII) Normal speech (IX, X, XII)/EOMI (III, IV, VI)/Hearing intact (VIII)

## 2022-07-13 NOTE — BH INPATIENT PSYCHIATRY ASSESSMENT NOTE - DETAILS
na n/a patient disorganization and paranoia per ED assessment and collateral from mother pt has in the past threatened to commit suicide, though known hx of suicide attempts

## 2022-07-13 NOTE — BH INPATIENT PSYCHIATRY ASSESSMENT NOTE - NSBHATTESTCOMMENTATTENDFT_PSY_A_CORE
Pt is a 33yo man with hx of polysubstance abuse presenting with symptoms of psychosis and lito in the context of substance use (opioids, THC, possibly xanax) and lack of outpt treatment. Pt presents as extremely anxious with psychotic fears about police and other tracking him down and killing him. Pt accepting of reassurance and medication to ease anxiety. Denies SIIP and HIIP. Aside from anxiety and restlessness, pt at this time without any overt symptoms of EtOH/benzo withdrawal or opioid withdrawal, vitals noted to be stable in the ED. DDx substance-induced psychosis vs primary psychotic disorder. Will start risperdal 1mg qhs and CIWA (symptom-triggered) and CINA protocol. Will expand collateral from family tomorrow.

## 2022-07-13 NOTE — BH CONSULTATION LIAISON PROGRESS NOTE - NSBHCHARTREVIEWVS_PSY_A_CORE FT
Vital Signs Last 24 Hrs  T(C): 36.4 (13 Jul 2022 00:06), Max: 37.1 (12 Jul 2022 14:24)  T(F): 97.6 (13 Jul 2022 00:06), Max: 98.8 (12 Jul 2022 21:16)  HR: 62 (13 Jul 2022 00:06) (62 - 98)  BP: 122/69 (13 Jul 2022 00:06) (111/77 - 190/100)  BP(mean): --  RR: 18 (13 Jul 2022 00:06) (16 - 18)  SpO2: 100% (13 Jul 2022 00:06) (98% - 100%)    Parameters below as of 13 Jul 2022 00:06  Patient On (Oxygen Delivery Method): room air

## 2022-07-13 NOTE — BH PATIENT PROFILE - TOBACCO CESSATION EDUCATION/COUNSELLING(PROVIDED IF TOBACCO USED IN THE PAST 30 DAYS- CORE MEASURE SITES)
Addended by: GWENDOLYN MONDRAGON on: 1/18/2021 02:39 PM     Modules accepted: Orders     Offered and provided

## 2022-07-13 NOTE — BH PATIENT PROFILE - STATED REASON FOR ADMISSION
Pt stated he kept having reoccurring 4 dreams of race cars that were causing a "chemical reaction" for 2-3 days. Pt kept stating "they are following me" and "they are trying to harm me". When asked who "they" are, pt stated "the dreams".

## 2022-07-13 NOTE — BH INPATIENT PSYCHIATRY ASSESSMENT NOTE - RISK ASSESSMENT
Pt is at moderate risk of suicide at this time. Static risk factors include hx of substance use, male sex. Dynamic risk factors include current substance use, unemployment, not currently in outpatient treatment, paranoia. Protective factors include supportive family, residential stability, no current SI. Pt is at moderate risk of suicide at this time. Static risk factors include hx of substance use. Dynamic risk factors include current substance use, unemployment, not currently in outpatient treatment, paranoia. Protective factors include supportive family, residential stability, no current SI.

## 2022-07-13 NOTE — BH INPATIENT PSYCHIATRY ASSESSMENT NOTE - ADDITIONAL DETAILS ALL
na n/a per ED assessment and collateral from mother pt has in the past threatened to commit suicide, though known hx of suicide attempts

## 2022-07-13 NOTE — BH CONSULTATION LIAISON PROGRESS NOTE - NSICDXBHSECONDARYDX_PSY_ALL_CORE
[facility] phone call message- medication clarification/question:    Full Medication Name: albuterol (PROVENTIL) and lidocaine (XYLOCAINE)   Dose: (2.5 MG/3ML) 0.083% neb solution and 2 % external gel      Question: Patient called requesting for refills for meds listed above. I did inform patient that both have been discontinued. He stated that he wants to go back on them and needs refill on the albuterol solutions. Offered appointment, however he does have an appointment for a Pre-Op on Friday as well. Informed patient will put in a message as it is not guaranteed and that he may be requested to be seen for an appointment.      Pharmacy confirmed as    [id number] - [address] & [id number] - [place], [place] - [address] AT [facility] & [facility] [address]: Yes: [facility]    OK to leave a message on voice mail? Yes    Primary language: English      needed? No    Call taken on Elizabeth 15, 2022 at 4:39 PM by Jaswant Pappas     Cannabis abuse   F12.10

## 2022-07-13 NOTE — BH INPATIENT PSYCHIATRY ASSESSMENT NOTE - VIOLENCE RISK FACTORS:
Feeling of being under threat and being unable to control threat/Substance abuse Feeling of being under threat and being unable to control threat/Substance abuse/Impulsivity

## 2022-07-13 NOTE — BH CONSULTATION LIAISON PROGRESS NOTE - NSBHCHARTREVIEWINVESTIGATE_PSY_A_CORE FT
ACC: [id number] EXAM:  CT BRAIN                        PROCEDURE DATE:  07/13/2022      IMPRESSION:  No acute intracranial hemorrhage, territorial infarct or mass effect.

## 2022-07-13 NOTE — ED BEHAVIORAL HEALTH NOTE - BEHAVIORAL HEALTH NOTE
Pt's mother called the ED requesting toxicology report.  Writer informed her due to confidentiality results cannot be disclosed to her by writer, but she can speak to patient who can provider her access to his chart if he agrees.  She was in agreement to speak to patient when he transfers to [facility] to obtain lab results.

## 2022-07-13 NOTE — ED ADULT NURSE REASSESSMENT NOTE - NS ED NURSE REASSESS COMMENT FT1
Pt is sleeping in bed, NAD, even unlabored respirations observed. + effect from medication received. VSS. Will continue to monitor for safety, pending psychiatric admission when bed is available.

## 2022-07-13 NOTE — BH PATIENT PROFILE - FALL HARM RISK - UNIVERSAL INTERVENTIONS
Bed in lowest position, wheels locked, appropriate side rails in place/Call bell, personal items and telephone in reach/Instruct patient to call for assistance before getting out of bed or chair/Non-slip footwear when patient is out of bed/[place] to call system/Physically safe environment - no spills, clutter or unnecessary equipment/Purposeful Proactive Rounding/Room/bathroom lighting operational, light cord in reach

## 2022-07-13 NOTE — BH INPATIENT PSYCHIATRY ASSESSMENT NOTE - CURRENT MEDICATION
MEDICATIONS  (STANDING):  risperiDONE   Tablet 1 milliGRAM(s) Oral at bedtime    MEDICATIONS  (PRN):  cloNIDine 0.1 milliGRAM(s) Oral every 4 hours PRN Withdrawal symptoms  diphenhydrAMINE 50 milliGRAM(s) Oral every 4 hours PRN agitation  diphenhydrAMINE Injectable 50 milliGRAM(s) IntraMuscular once PRN severe agitation  haloperidol     Tablet 5 milliGRAM(s) Oral every 4 hours PRN agitation  haloperidol    Injectable 5 milliGRAM(s) IntraMuscular once PRN severe agitation  hydrOXYzine hydrochloride 50 milliGRAM(s) Oral every 4 hours PRN Anxiety  loperamide 2 milliGRAM(s) Oral every 4 hours PRN After each loose stool as needed for diarrhea  LORazepam     Tablet 2 milliGRAM(s) Oral every 2 hours PRN CIWA score increase by 2 points and current CIWA score GREATER THAN 9  LORazepam     Tablet 2 milliGRAM(s) Oral every 4 hours PRN agitation  LORazepam   Injectable 2 milliGRAM(s) IntraMuscular once PRN severe agitation  nicotine  Polacrilex Gum 4 milliGRAM(s) Oral every 2 hours PRN nicotine withdrawal
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
## 2022-07-13 NOTE — BH CONSULTATION LIAISON PROGRESS NOTE - MSE UNSTRUCTURED FT
The patient appears stated age, somewhat disheveled, dressed in hospital attire. The patient is initially calm but becomes tearful, distressed, and agitated throughout interview. Eye contact is fair.   The patient's speech was fluent, somewhat overproductive, and at times loud.  The patient's mood is "bad."  Affect is anxious to tearful, labile, and congruent.  The patient's thoughts are goal directed with paranoid delusional thought content.  Denies hallucinations. Denies any S/H/I/I/P.  Insight and judgement are impaired.  Impulse control is impaired. The patient appears stated age, somewhat disheveled, dressed in hospital attire. The patient is initially calm but becomes tearful, distressed, and agitated throughout interview. Eye contact is fair. BL hand tremor is present.  The patient's speech was fluent, somewhat overproductive, and at times loud.  The patient's mood is "bad."  Affect is anxious to tearful, labile, and congruent.  The patient's thoughts are goal directed with paranoid delusional thought content.  Denies hallucinations. Denies any S/H/I/I/P.  Insight and judgement are impaired.  Impulse control is impaired.

## 2022-07-13 NOTE — BH INPATIENT PSYCHIATRY ASSESSMENT NOTE - NSBHCHARTREVIEWVS_PSY_A_CORE FT
Vital Signs Last 24 Hrs  T(C): 36.8 (07-13-22 @ 15:57), Max: 37.1 (07-12-22 @ 21:16)  T(F): 98.3 (07-13-22 @ 15:57), Max: 98.8 (07-12-22 @ 21:16)  HR: 86 (07-13-22 @ 11:42) (62 - 98)  BP: 133/79 (07-13-22 @ 11:42) (111/77 - 133/79)  BP(mean): --  RR: 20 (07-13-22 @ 11:42) (16 - 20)  SpO2: 100% (07-13-22 @ 11:42) (100% - 100%)    Orthostatic VS  07-13-22 @ 15:57  Lying BP: --/-- HR: --  Sitting BP: 125/83 HR: 117  Standing BP: 116/70 HR: 115  Site: upper left arm  Mode: electronic   Vital Signs Last 24 Hrs  T(C): 36.3 (07-13-22 @ 19:41), Max: 37.1 (07-12-22 @ 21:16)  T(F): 97.4 (07-13-22 @ 19:41), Max: 98.8 (07-12-22 @ 21:16)  HR: 86 (07-13-22 @ 11:42) (62 - 86)  BP: 133/79 (07-13-22 @ 11:42) (122/69 - 133/79)  BP(mean): --  RR: 20 (07-13-22 @ 11:42) (16 - 20)  SpO2: 100% (07-13-22 @ 11:42) (100% - 100%)    Orthostatic VS  07-13-22 @ 19:41  Lying BP: --/-- HR: --  Sitting BP: --/-- HR: --  Standing BP: 113/78 HR: 93  Site: upper left arm  Mode: electronic  Orthostatic VS  07-13-22 @ 15:57  Lying BP: --/-- HR: --  Sitting BP: 125/83 HR: 117  Standing BP: 116/70 HR: 115  Site: upper left arm  Mode: electronic

## 2022-07-13 NOTE — BH INPATIENT PSYCHIATRY ASSESSMENT NOTE - NSACTIVEVENT_PSY_ALL_CORE
Triggering events leading to humiliation, shame, and/or despair (e.g., Loss of relationship, financial or health status) (real or anticipated) Substance intoxication or withdrawal/Recent onset of psychiatric illness

## 2022-07-13 NOTE — BH CONSULTATION LIAISON PROGRESS NOTE - NSBHCHARTREVIEWLAB_PSY_A_CORE FT
COVID-19 PCR: NotDetec(07.12.22 @ 20:42)                         14.5   7.14  )-----------( 315      ( 12 Jul 2022 15:48 )             42.9     07-12    141  |  104  |  10  ----------------------------<  131<H>  4.0   |  25  |  0.81    Ca    10.1      12 Jul 2022 15:48  Mg     2.10     07-12    TPro  7.5  /  Alb  4.8  /  TBili  0.6  /  DBili  x   /  AST  16  /  ALT  12  /  AlkPhos  69  07-12    Thyroid Stimulating Hormone, Serum: 0.88 uIU/mL (07.12.22 @ 15:48)     THC, Urine Qualitative: Positive (07.12.22 @ 19:40)   Benzodiazepine, Urine: Positive (07.12.22 @ 19:40)   Oxycodone, Urine: Negative (07.12.22 @ 19:40)   Methadone, Urine: Negative (07.12.22 @ 19:40)   Phencyclidine Level, Urine: Negative (07.12.22 @ 19:40)   Barbiturates Screen, Urine: Negative (07.12.22 @ 19:40)   Opiate, Urine: Negative (07.12.22 @ 19:40)   Amphetamine, Urine: Negative (07.12.22 @ 19:40)   Cocaine Metabolite, Urine: Negative (07.12.22 @ 19:40)     Alcohol, Blood: <10: <10 mg/dL = Negative mg/dL (07.12.22 @ 15:48)       Urinalysis (07.12.22 @ 19:40)   Blood, Urine: Negative   Glucose Qualitative, Urine: Negative   pH Urine: 6.5   Color: Yellow   Urine Appearance: Clear   Bilirubin: Negative   Ketone - Urine: Moderate   Specific Gravity: 1.032   Protein, Urine: 30 mg/dL   Urobilinogen: <2 mg/dL   Nitrite: Negative   Leukocyte Esterase Concentration: Negative

## 2022-07-13 NOTE — BH INPATIENT PSYCHIATRY ASSESSMENT NOTE - OTHER PAST PSYCHIATRIC HISTORY (INCLUDE DETAILS REGARDING ONSET, COURSE OF ILLNESS, INPATIENT/OUTPATIENT TREATMENT)
Pt reports once being brought to the ED for alcohol use  He states he saw a psychiatrist 1-2 years ago  never on psychiatric medications Pt reports once being brought to the ED for alcohol use  He states he saw a psychiatrist 1-2 years ago  States he is on prescribed Xanax

## 2022-07-13 NOTE — ED BEHAVIORAL HEALTH NOTE - BEHAVIORAL HEALTH NOTE
received a call from Yani pak's mother  who requested update.   informed her patient will be transferred to [facility] this afternoon and provided unit contact information.

## 2022-07-14 PROCEDURE — 99233 SBSQ HOSP IP/OBS HIGH 50: CPT | Mod: GC

## 2022-07-14 RX ORDER — OLANZAPINE 15 MG/1
5 TABLET, FILM COATED ORAL EVERY 6 HOURS
Refills: 0 | Status: DISCONTINUED | OUTPATIENT
Start: 2022-07-14 | End: 2022-07-14

## 2022-07-14 RX ORDER — BACITRACIN ZINC 500 UNIT/G
1 OINTMENT IN PACKET (EA) TOPICAL
Refills: 0 | Status: COMPLETED | OUTPATIENT
Start: 2022-07-14 | End: 2022-07-17

## 2022-07-14 RX ORDER — OLANZAPINE 15 MG/1
5 TABLET, FILM COATED ORAL ONCE
Refills: 0 | Status: COMPLETED | OUTPATIENT
Start: 2022-07-14 | End: 2022-07-14

## 2022-07-14 RX ADMIN — Medication 2 MILLIGRAM(S): at 20:27

## 2022-07-14 RX ADMIN — Medication 1 MILLIGRAM(S): at 22:01

## 2022-07-14 RX ADMIN — Medication 2 MILLIGRAM(S): at 11:19

## 2022-07-14 RX ADMIN — HALOPERIDOL DECANOATE 5 MILLIGRAM(S): 100 INJECTION INTRAMUSCULAR at 02:26

## 2022-07-14 RX ADMIN — OLANZAPINE 5 MILLIGRAM(S): 15 TABLET, FILM COATED ORAL at 11:31

## 2022-07-14 RX ADMIN — Medication 50 MILLIGRAM(S): at 02:27

## 2022-07-14 RX ADMIN — Medication 1 APPLICATION(S): at 20:28

## 2022-07-14 RX ADMIN — Medication 50 MILLIGRAM(S): at 05:16

## 2022-07-14 RX ADMIN — Medication 2 MILLIGRAM(S): at 05:16

## 2022-07-14 RX ADMIN — Medication 2 MILLIGRAM(S): at 15:41

## 2022-07-14 RX ADMIN — Medication 2 MILLIGRAM(S): at 02:27

## 2022-07-14 NOTE — PSYCHIATRIC REHAB INITIAL EVALUATION - NSBHPRRECOMMEND_PSY_ALL_CORE
Writer met with pt. to orient him to psych rehab staff and services. Pt. is a 32-year-old male, single, domiciled with mother in a private residence, unemployed and has a hx of substance abuse (using suboxone and Percocet weekly. Pt. presented as very medicated upon interview but was calm. Throughout the session, the pt. was slurring his words, and confused. Pt. stated that there were “after him” and that they were “not doing their job”. Pt. was unable to elaborate and was mumbling and slurring his words, so writer was unable to understand what he was saying. Pt. was able to answer basic questions about his work and education status. In the middle of the interview the pt. jolted out of bed and raised his hand to his ear as if he was talking on the phone and began speaking. Pt. went to the bathroom to “talk on the phone” and came back stating “I will send you my email”. Pt. sat back down on his bed and asked writer where his phone went stating “I literally just had It and now I can’t find it” and proceeded to look for his phone. Writer supported pt. and stated that he did not have his phone and phones are not allowed on the unit. Pt. then said “oh” laid in his bed and closed his eyes.  was unable to establish a collaborative treatment goal due to disorganization so writer established on for him to work on over the next 7 days.

## 2022-07-14 NOTE — BH INPATIENT PSYCHIATRY PROGRESS NOTE - CURRENT MEDICATION
MEDICATIONS  (STANDING):  LORazepam     Tablet 2 milliGRAM(s) Oral three times a day    MEDICATIONS  (PRN):  cloNIDine 0.1 milliGRAM(s) Oral every 4 hours PRN Withdrawal symptoms  loperamide 2 milliGRAM(s) Oral every 4 hours PRN After each loose stool as needed for diarrhea  LORazepam     Tablet 2 milliGRAM(s) Oral every 2 hours PRN CIWA score increase by 2 points and current CIWA score GREATER THAN 9  LORazepam     Tablet 2 milliGRAM(s) Oral every 4 hours PRN agitation  LORazepam   Injectable 2 milliGRAM(s) IntraMuscular once PRN severe agitation  nicotine  Polacrilex Gum 4 milliGRAM(s) Oral every 2 hours PRN nicotine withdrawal   MEDICATIONS  (STANDING):  BACItracin   Ointment 1 Application(s) Topical two times a day  LORazepam     Tablet 2 milliGRAM(s) Oral three times a day    MEDICATIONS  (PRN):  cloNIDine 0.1 milliGRAM(s) Oral every 4 hours PRN Withdrawal symptoms  loperamide 2 milliGRAM(s) Oral every 4 hours PRN After each loose stool as needed for diarrhea  LORazepam     Tablet 2 milliGRAM(s) Oral every 2 hours PRN CIWA score increase by 2 points and current CIWA score GREATER THAN 9  LORazepam     Tablet 2 milliGRAM(s) Oral every 4 hours PRN agitation  LORazepam   Injectable 2 milliGRAM(s) IntraMuscular once PRN severe agitation  nicotine  Polacrilex Gum 4 milliGRAM(s) Oral every 2 hours PRN nicotine withdrawal

## 2022-07-14 NOTE — BH INPATIENT PSYCHIATRY PROGRESS NOTE - NSBHASSESSSUMMFT_PSY_ALL_CORE
The patient is a 32 year old man with a history of polysubstance use disorder (Percocet, Xanax, marijuana) presenting with paranoid psychosis in the context of substance use (THC, Percocet, possibly Suboxone, possibly Xanax) and lack of outpatient treatment.     On exam, the patient continues to be disorganized and overtly paranoid. He is highly labile, anxious, and unable to sleep. He is exhibiting restlessness and repeated behaviors. pt at this time without any symptoms of EtOH/benzo or opioid withdrawal and vitals are noted to be stable. Symptoms are likely the result of agitated catatonia with the differential diagnosis including substance-induced psychosis vs first episode psychosis vs Bipolar I Disorder (current episode manic with psychosis).     Plan:  - CO  - vitals q2h  - discontinue all antipsychotics  - discontinue all antihistamines  - start lorazepam 2mg PO TID  - strict monitoring of I/Os  - CIWA protocol with PRN lorazepam  - CINA protocol  The patient is a 32 year old man with a history of polysubstance use disorder (Percocet, Xanax, marijuana) presenting with symptoms of psychosis and lito in the context of substance use (THC, Percocet, possibly Suboxone, possibly Xanax) and lack of outpatient treatment.     On exam, the patient continues to be disorganized and overtly paranoid. He is highly labile, anxious, and unable to sleep. He is exhibiting restlessness and repeated behaviors. pt at this time without any symptoms of EtOH/benzo or opioid withdrawal and vitals are noted to be stable. Symptoms are likely the result of agitated catatonia with the differential diagnosis including substance-induced psychosis/lito vs first episode psychosis vs Bipolar I Disorder (current episode manic with psychosis).     Plan:  - Start CO 1:1  - vitals q2h  - discontinue all antipsychotics  - discontinue all antihistamines  - start lorazepam 2mg PO TID  - strict monitoring of I/Os  - CIWA protocol with PRN lorazepam  - CINA protocol

## 2022-07-14 NOTE — BH TREATMENT PLAN - NSTXPSYCHOINTERMD_PSY_ALL_CORE
holding antipsychotics for now, will rule out agitated catatonia, treat with Ativan for now, will consider adding antipsychotic

## 2022-07-14 NOTE — BH SOCIAL WORK INITIAL PSYCHOSOCIAL EVALUATION - OTHER PAST PSYCHIATRIC HISTORY (INCLUDE DETAILS REGARDING ONSET, COURSE OF ILLNESS, INPATIENT/OUTPATIENT TREATMENT)
EMR reports "The patient is a 32 year old man, domiciled with mother in a private residence, unemployed, hx of substance use (reports suboxone use, Percocet weekly), hx of seeing a psychiatrist (1-2 years ago) though none currently, not previously on medications, denies any inpatient psychiatric admissions, denies hx of SAs/SIB who presents via EMS for bizarre behavior."   Pt is currently too disorganized to participate in SW assessment.

## 2022-07-14 NOTE — BH INPATIENT PSYCHIATRY PROGRESS NOTE - NSBHMETABOLIC_PSY_ALL_CORE_FT
BMI:   HbA1c:   Glucose:   BP: 124/68 (07-14-22 @ 14:57) (68/93 - 190/100)  Lipid Panel:  BMI:   HbA1c:   Glucose:   BP: 128/74 (07-14-22 @ 20:35) (68/93 - 190/100)  Lipid Panel:

## 2022-07-14 NOTE — BH INPATIENT PSYCHIATRY PROGRESS NOTE - MSE UNSTRUCTURED FT
TT is a cis man wearing hospital gowns. He is poorly groomed. He was unable to tolerate a full interview due to disorganization. Shifted from laying in bed to sitting up repeatedly. He speaks at a pressured rate with slurred speech. He was unable to state mood. Affect is anxious, labile,. His thought content was focused on persecutory delusions. Denies SIIP and HIIP. Displayed overt paranoia and persecutory fears. He denies AH and VH. Insight is poor. Judgment is poor. Impulse control was appropriate but likely tenuous given overt persecutory fears.  TT is a cis man wearing hospital gowns. He is poorly groomed. He was unable to tolerate a full interview due to disorganization. Shifted from laying in bed to sitting up repeatedly, very restless and pacing. He speaks at a pressured rate with slurred speech. He was unable to state mood. Affect is anxious, at times tearful without explanation, labile. His thought content was focused on persecutory delusions. Denies SIIP and HIIP. Perception: possible AH/VH though unclear at this time. Insight is poor. Judgment is poor. Impulse control is poor at this time, tenuous. Pt oriented only to person and age/birthday (states month to January, year 1995, place to be a hotel in [place] then later a hospital in [place], does not know president, says "that horrible orange sd").

## 2022-07-14 NOTE — BH INPATIENT PSYCHIATRY PROGRESS NOTE - NSBHFUPINTERVALHXFT_PSY_A_CORE
Patient exhibits gross disorganization and agitation overnight requiring multiple PRNs of haloperidol, diphenhydramine, hydroxyzine, and lorazepam. Did not sleep. Oriented to person. Displaying overt paranoia––checking doors, pacing the halls. At times agitated and attempting to lift tables. Attempted to exit into backyard outside of fresh air time. Exhibiting repeated, restless behaviors including moving bedsheets back and forth between his bed and shower. Pupils remain normal in diameter with adequate reactivity to light. No evidence of rigidity. Vitals are at times tenuous but have been stable most of the afternoon. Since the afternoon, has been Isolated to his room with CO.    Per conversation with sister (Adeline––[phone]), this is not the first instance where he has exhibited such symptoms. He had been alone the past three weeks when the mother noticed he had been acting strangely based on their phone conversations. She reports a long-standing history of substance use disorder, listing multiple prescription and street drugs (benzos, cocaine, Adderall, alcohol, thc, opioids/opiates). She states that the patient will intentionally “shop around” for psychiatrists to prescribe him medication.  Patient exhibits gross disorganization and agitation overnight requiring multiple PRNs of haloperidol, diphenhydramine, hydroxyzine, and lorazepam. Did not sleep. Oriented to person, though not to place, date, or situation. Displaying overt paranoia––checking doors, pacing the halls. At times agitated and attempting to lift tables. Attempted to exit into backyard outside of fresh air time. Exhibiting repeated, restless behaviors including moving bedsheets back and forth between his bed and shower. Pt unable to engage in meaningful interview due to gross disorganization and confusion.     On exam, pupils remain normal in diameter with adequate reactivity to light. No evidence of rigidity. No tremor. Pt reports having a headaches. Denies N/V/D.    Vitals have been normal aside from transient hypotension, responsive to PO/fluid intake. CIWA scores ranging from 4 - 10.     Per conversation with sister (Adeline––[phone]), this is not the first instance where he has exhibited such symptoms. He had been alone the past three weeks when the mother noticed he had been acting strangely based on their phone conversations. She reports a long-standing history of substance use disorder, listing multiple prescription and street drugs (benzos, cocaine, Adderall, alcohol, thc, opioids/opiates). She states that the patient will intentionally “shop around” for psychiatrists to prescribe him medication.

## 2022-07-14 NOTE — BH INPATIENT PSYCHIATRY PROGRESS NOTE - NSBHCHARTREVIEWVS_PSY_A_CORE FT
Vital Signs Last 24 Hrs  T(C): 36.5 (07-14-22 @ 14:57), Max: 36.7 (07-14-22 @ 05:05)  T(F): 97.7 (07-14-22 @ 14:57), Max: 98 (07-14-22 @ 05:05)  HR: 78 (07-14-22 @ 14:57) (78 - 103)  BP: 124/68 (07-14-22 @ 14:57) (68/93 - 124/68)  BP(mean): --  RR: 16 (07-14-22 @ 14:57) (16 - 16)  SpO2: 99% (07-14-22 @ 14:57) (99% - 99%)    Orthostatic VS  07-13-22 @ 23:18  Lying BP: --/-- HR: --  Sitting BP: 102/69 HR: 89  Standing BP: --/-- HR: --  Site: upper left arm  Mode: electronic  Orthostatic VS  07-13-22 @ 19:41  Lying BP: --/-- HR: --  Sitting BP: --/-- HR: --  Standing BP: 113/78 HR: 93  Site: upper left arm  Mode: electronic  Orthostatic VS  07-13-22 @ 15:57  Lying BP: --/-- HR: --  Sitting BP: 125/83 HR: 117  Standing BP: 116/70 HR: 115  Site: upper left arm  Mode: electronic   Vital Signs Last 24 Hrs  T(C): 36.8 (07-14-22 @ 20:35), Max: 36.8 (07-14-22 @ 20:35)  T(F): 98.3 (07-14-22 @ 20:35), Max: 98.3 (07-14-22 @ 20:35)  HR: 92 (07-14-22 @ 20:35) (78 - 103)  BP: 128/74 (07-14-22 @ 20:35) (68/93 - 128/74)  BP(mean): --  RR: 18 (07-14-22 @ 20:35) (16 - 18)  SpO2: 99% (07-14-22 @ 20:35) (99% - 99%)    Orthostatic VS  07-13-22 @ 23:18  Lying BP: --/-- HR: --  Sitting BP: 102/69 HR: 89  Standing BP: --/-- HR: --  Site: upper left arm  Mode: electronic  Orthostatic VS  07-13-22 @ 19:41  Lying BP: --/-- HR: --  Sitting BP: --/-- HR: --  Standing BP: 113/78 HR: 93  Site: upper left arm  Mode: electronic  Orthostatic VS  07-13-22 @ 15:57  Lying BP: --/-- HR: --  Sitting BP: 125/83 HR: 117  Standing BP: 116/70 HR: 115  Site: upper left arm  Mode: electronic

## 2022-07-14 NOTE — BH TREATMENT PLAN - NSTXDISORGINTERPR_PSY_ALL_CORE
Over the next 7 days, Psychiatric Rehabilitation staff will utilize group and individual psychotherapy, and psychoeducation to assist the patient in reaching established treatment goal.

## 2022-07-15 RX ADMIN — Medication 1 APPLICATION(S): at 08:18

## 2022-07-15 RX ADMIN — Medication 2 MILLIGRAM(S): at 17:11

## 2022-07-15 RX ADMIN — Medication 2 MILLIGRAM(S): at 20:22

## 2022-07-15 RX ADMIN — Medication 1 APPLICATION(S): at 20:21

## 2022-07-15 RX ADMIN — Medication 2 MILLIGRAM(S): at 13:50

## 2022-07-15 RX ADMIN — Medication 2 MILLIGRAM(S): at 12:00

## 2022-07-15 RX ADMIN — Medication 2 MILLIGRAM(S): at 08:16

## 2022-07-15 NOTE — BH INPATIENT PSYCHIATRY PROGRESS NOTE - NSBHASSESSSUMMFT_PSY_ALL_CORE
The patient is a 32 year-old man with a history of polysubstance use disorder (Percocet, Xanax, marijuana) presenting with paranoid psychosis in the context of substance use (THC, Percocet, possibly Suboxone, possibly Xanax) and lack of outpatient treatment.     On exam, the patient continues to be disorganized and overtly paranoid. He is labile, anxious, and unable to sleep. He is less restless than yesterday and is no longer exhibiting repetitive behaviors. Patient at this time without any symptoms of EtOH/benzo or opioid withdrawal and vitals are noted to be stable. Symptoms are likely the result of agitated catatonia with the differential diagnosis including substance-induced psychosis vs first episode psychosis vs Bipolar I Disorder (current episode manic with psychosis).     Plan:  -	continue CO 1:1  -	routine vitals  -	increase lorazepam 2mg PO to QID  -	strict monitoring of I/Os  -	CIWA protocol with PRN lorazepam  -	CINA protocol The patient is a 32 year old man with a history of polysubstance use disorder (Percocet, Xanax, marijuana) presenting with symptoms of psychosis and lito in the context of substance use (THC, Percocet, possibly Suboxone, possibly Xanax) and lack of outpatient treatment.     On exam, the patient continues to be disorganized and overtly paranoid. He is labile, anxious, and unable to sleep. He is less restless than yesterday and is no longer exhibiting repetitive behaviors. Patient at this time without any symptoms of EtOH/benzo or opioid withdrawal and vitals are noted to be stable. Symptoms are likely the result of agitated catatonia with the differential diagnosis including substance-induced psychosis vs first episode psychosis vs Bipolar I Disorder (current episode manic with psychosis).     Plan:  -	continue CO 1:1  -	routine vitals  -	increase lorazepam 2mg PO to QID  -	strict monitoring of I/Os  -	CIWA protocol with PRN lorazepam  -	CINA protocol  - Holding off on antipsychotics for now given ongoing concern for agitated catatonia/delirious lito

## 2022-07-15 NOTE — BH INPATIENT PSYCHIATRY PROGRESS NOTE - CURRENT MEDICATION
MEDICATIONS  (STANDING):  BACItracin   Ointment 1 Application(s) Topical two times a day  LORazepam     Tablet 2 milliGRAM(s) Oral four times a day    MEDICATIONS  (PRN):  cloNIDine 0.1 milliGRAM(s) Oral every 4 hours PRN Withdrawal symptoms  loperamide 2 milliGRAM(s) Oral every 4 hours PRN After each loose stool as needed for diarrhea  LORazepam     Tablet 2 milliGRAM(s) Oral every 2 hours PRN CIWA score increase by 2 points and current CIWA score GREATER THAN 9  LORazepam     Tablet 2 milliGRAM(s) Oral every 4 hours PRN agitation  LORazepam   Injectable 2 milliGRAM(s) IntraMuscular once PRN severe agitation  nicotine  Polacrilex Gum 4 milliGRAM(s) Oral every 2 hours PRN nicotine withdrawal

## 2022-07-15 NOTE — BH INPATIENT PSYCHIATRY PROGRESS NOTE - NSBHCHARTREVIEWVS_PSY_A_CORE FT
Vital Signs Last 24 Hrs  T(C): 36.4 (07-15-22 @ 12:02), Max: 36.8 (07-14-22 @ 20:35)  T(F): 97.6 (07-15-22 @ 12:02), Max: 98.3 (07-14-22 @ 20:35)  HR: 78 (07-15-22 @ 12:02) (78 - 92)  BP: 110/76 (07-15-22 @ 12:02) (110/76 - 128/74)  BP(mean): --  RR: 18 (07-14-22 @ 20:35) (18 - 18)  SpO2: 99% (07-14-22 @ 20:35) (99% - 99%)    Orthostatic VS  07-15-22 @ 06:37  Lying BP: --/-- HR: --  Sitting BP: 110/79 HR: 75  Standing BP: 114/82 HR: 105  Site: --  Mode: --  Orthostatic VS  07-13-22 @ 23:18  Lying BP: --/-- HR: --  Sitting BP: 102/69 HR: 89  Standing BP: --/-- HR: --  Site: upper left arm  Mode: electronic  Orthostatic VS  07-13-22 @ 19:41  Lying BP: --/-- HR: --  Sitting BP: --/-- HR: --  Standing BP: 113/78 HR: 93  Site: upper left arm  Mode: electronic
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
## 2022-07-16 PROCEDURE — 99232 SBSQ HOSP IP/OBS MODERATE 35: CPT

## 2022-07-16 RX ORDER — ONDANSETRON 8 MG/1
4 TABLET, FILM COATED ORAL EVERY 8 HOURS
Refills: 0 | Status: DISCONTINUED | OUTPATIENT
Start: 2022-07-16 | End: 2022-07-22

## 2022-07-16 RX ADMIN — Medication 4 MILLIGRAM(S): at 15:16

## 2022-07-16 RX ADMIN — Medication 4 MILLIGRAM(S): at 19:19

## 2022-07-16 RX ADMIN — Medication 2 MILLIGRAM(S): at 17:20

## 2022-07-16 RX ADMIN — Medication 4 MILLIGRAM(S): at 13:07

## 2022-07-16 RX ADMIN — ONDANSETRON 4 MILLIGRAM(S): 8 TABLET, FILM COATED ORAL at 13:07

## 2022-07-16 RX ADMIN — Medication 2 MILLIGRAM(S): at 08:48

## 2022-07-16 RX ADMIN — Medication 4 MILLIGRAM(S): at 17:16

## 2022-07-16 RX ADMIN — Medication 1 APPLICATION(S): at 20:28

## 2022-07-16 RX ADMIN — Medication 2 MILLIGRAM(S): at 20:28

## 2022-07-16 RX ADMIN — Medication 2 MILLIGRAM(S): at 13:07

## 2022-07-16 RX ADMIN — Medication 1 APPLICATION(S): at 09:55

## 2022-07-16 NOTE — BH INPATIENT PSYCHIATRY PROGRESS NOTE - CURRENT MEDICATION
MEDICATIONS  (STANDING):  BACItracin   Ointment 1 Application(s) Topical two times a day  LORazepam     Tablet 2 milliGRAM(s) Oral four times a day    MEDICATIONS  (PRN):  cloNIDine 0.1 milliGRAM(s) Oral every 4 hours PRN Withdrawal symptoms  loperamide 2 milliGRAM(s) Oral every 4 hours PRN After each loose stool as needed for diarrhea  LORazepam     Tablet 2 milliGRAM(s) Oral every 2 hours PRN CIWA score increase by 2 points and current CIWA score GREATER THAN 9  LORazepam     Tablet 2 milliGRAM(s) Oral every 4 hours PRN agitation  LORazepam   Injectable 2 milliGRAM(s) IntraMuscular once PRN severe agitation  nicotine  Polacrilex Gum 4 milliGRAM(s) Oral every 2 hours PRN nicotine withdrawal  ondansetron    Tablet 4 milliGRAM(s) Oral every 8 hours PRN nausea

## 2022-07-16 NOTE — BH INPATIENT PSYCHIATRY PROGRESS NOTE - MSE UNSTRUCTURED FT
The patient is a cis man wearing street clothes. He is poorly groomed. Eye contact was poor. He continues to speaks with slurred speech. He says his mood is “okay.” Denies SIIP and HIIP. He denies AH and VH. Insight is poor. Judgment is poor. Impulse control is impaired.
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
## 2022-07-16 NOTE — BH INPATIENT PSYCHIATRY PROGRESS NOTE - NSBHCHARTREVIEWVS_PSY_A_CORE FT
Vital Signs Last 24 Hrs  T(C): 36.6 (07-16-22 @ 14:18), Max: 36.6 (07-16-22 @ 14:18)  T(F): 97.8 (07-16-22 @ 14:18), Max: 97.8 (07-16-22 @ 14:18)  HR: 58 (07-16-22 @ 14:18) (58 - 58)  BP: 105/61 (07-16-22 @ 14:18) (105/61 - 105/61)  BP(mean): 69 (07-16-22 @ 14:18) (69 - 69)  RR: 18 (07-16-22 @ 14:18) (18 - 18)  SpO2: --    Orthostatic VS  07-16-22 @ 12:35  Lying BP: 101/76 HR: 63  Sitting BP: --/-- HR: --  Standing BP: --/-- HR: --  Site: --  Mode: --  Orthostatic VS  07-16-22 @ 08:17  Lying BP: --/-- HR: --  Sitting BP: 95/60 HR: 65  Standing BP: --/-- HR: --  Site: --  Mode: --  Orthostatic VS  07-15-22 @ 06:37  Lying BP: --/-- HR: --  Sitting BP: 110/79 HR: 75  Standing BP: 114/82 HR: 105  Site: --  Mode: --
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
## 2022-07-17 PROCEDURE — 93010 ELECTROCARDIOGRAM REPORT: CPT

## 2022-07-17 RX ORDER — LANOLIN ALCOHOL/MO/W.PET/CERES
5 CREAM (GRAM) TOPICAL ONCE
Refills: 0 | Status: COMPLETED | OUTPATIENT
Start: 2022-07-17 | End: 2022-07-17

## 2022-07-17 RX ADMIN — Medication 2 MILLIGRAM(S): at 13:34

## 2022-07-17 RX ADMIN — Medication 4 MILLIGRAM(S): at 08:52

## 2022-07-17 RX ADMIN — Medication 2 MILLIGRAM(S): at 20:03

## 2022-07-17 RX ADMIN — Medication 2 MILLIGRAM(S): at 16:20

## 2022-07-17 RX ADMIN — Medication 5 MILLIGRAM(S): at 21:42

## 2022-07-17 RX ADMIN — Medication 4 MILLIGRAM(S): at 22:15

## 2022-07-17 RX ADMIN — Medication 4 MILLIGRAM(S): at 15:53

## 2022-07-17 RX ADMIN — Medication 4 MILLIGRAM(S): at 20:03

## 2022-07-17 RX ADMIN — Medication 2 MILLIGRAM(S): at 08:49

## 2022-07-17 RX ADMIN — Medication 2 MILLIGRAM(S): at 12:24

## 2022-07-17 RX ADMIN — Medication 1 APPLICATION(S): at 08:48

## 2022-07-17 RX ADMIN — Medication 4 MILLIGRAM(S): at 12:24

## 2022-07-17 NOTE — BH INPATIENT PSYCHIATRY PROGRESS NOTE - NSBHASSESSSUMMFT_PSY_ALL_CORE
The patient is a 32 year old man with a history of polysubstance use disorder (Percocet, Xanax, marijuana) presenting with symptoms of psychosis and lito in the context of substance use (THC, Percocet, possibly Suboxone, possibly Xanax) and lack of outpatient treatment.     Pt. is more alert and engaged today. Reporting improvement in mood, TP and TC. He denies s/i/p, h/i/p. Catatonic Sx not observed. Overt psychotic Sx also not assessed. Continue medication as ordered; evaluate need for antipsychotic.  Symptoms are likely the result of agitated catatonia with the differential diagnosis including substance-induced psychosis vs first episode psychosis vs Bipolar I Disorder (current episode manic with psychosis).     Plan:  -	continue CO 1:1  -	routine vitals  -	increase lorazepam 2mg PO to QID  -	strict monitoring of I/Os  -	CIWA protocol with PRN lorazepam  -	CINA protocol  - Holding off on antipsychotics for now given ongoing concern for agitated catatonia/delirious lito

## 2022-07-17 NOTE — BH INPATIENT PSYCHIATRY PROGRESS NOTE - CURRENT MEDICATION
MEDICATIONS  (STANDING):  LORazepam     Tablet 2 milliGRAM(s) Oral four times a day    MEDICATIONS  (PRN):  cloNIDine 0.1 milliGRAM(s) Oral every 4 hours PRN Withdrawal symptoms  loperamide 2 milliGRAM(s) Oral every 4 hours PRN After each loose stool as needed for diarrhea  LORazepam     Tablet 2 milliGRAM(s) Oral every 2 hours PRN CIWA score increase by 2 points and current CIWA score GREATER THAN 9  LORazepam     Tablet 2 milliGRAM(s) Oral every 4 hours PRN agitation  LORazepam   Injectable 2 milliGRAM(s) IntraMuscular once PRN severe agitation  nicotine  Polacrilex Gum 4 milliGRAM(s) Oral every 2 hours PRN nicotine withdrawal  ondansetron    Tablet 4 milliGRAM(s) Oral every 8 hours PRN nausea

## 2022-07-17 NOTE — BH INPATIENT PSYCHIATRY PROGRESS NOTE - MSE UNSTRUCTURED FT
How Severe Is Your Skin Lesion?: mild Have Your Skin Lesions Been Treated?: not been treated Is This A New Presentation, Or A Follow-Up?: Growth Additional History: Patient present with son. Here for annual exam The patient is a cis man wearing street clothes. He is poorly groomed. Eye contact was fair. He is calm and cooperative. No PMR/PMA. Unable to assess gait. Speech is of normal tone, volume and rate.  He says his mood is “content.” Affect is constricted, stable, congruent to mood. Denies SIIP and HIIP. He denies AH and VH. Insight is poor. Judgment is poor. Impulse control is impaired.

## 2022-07-17 NOTE — BH INPATIENT PSYCHIATRY PROGRESS NOTE - NSBHCHARTREVIEWVS_PSY_A_CORE FT
Vital Signs Last 24 Hrs  T(C): 36.5 (07-17-22 @ 07:56), Max: 36.5 (07-17-22 @ 07:56)  T(F): 97.7 (07-17-22 @ 07:56), Max: 97.7 (07-17-22 @ 07:56)  HR: 96 (07-17-22 @ 13:25) (70 - 96)  BP: 102/62 (07-17-22 @ 13:25) (102/62 - 115/79)  BP(mean): --  RR: 18 (07-17-22 @ 13:25) (18 - 18)  SpO2: 100% (07-17-22 @ 13:25) (100% - 100%)    Orthostatic VS  07-16-22 @ 12:35  Lying BP: 101/76 HR: 63  Sitting BP: --/-- HR: --  Standing BP: --/-- HR: --  Site: --  Mode: --  Orthostatic VS  07-16-22 @ 08:17  Lying BP: --/-- HR: --  Sitting BP: 95/60 HR: 65  Standing BP: --/-- HR: --  Site: --  Mode: --

## 2022-07-17 NOTE — BH INPATIENT PSYCHIATRY PROGRESS NOTE - NSBHFUPINTERVALHXFT_PSY_A_CORE
Chart reviewed and discussed with nursing. No events reported overnight. Pt remains on CO. No aggression; pt. received Ativan PO PRN today for anxiety.   The pt. reports his sleep is "much better" and is eating adequately. He describes his mood as "content." He reports having anxiety due to "pretty much everything." He denies s/i/p, h/i/p. Pt. denied A/H, V/H and delusions. Catatonic Sx not observed: pt. is not rigid, cog wheeling not observed. Pt. is able to follow command and moving in bed. Vital sins are stable.

## 2022-07-17 NOTE — BH CHART NOTE - NSEVENTNOTEFT_PSY_ALL_CORE
Interval History:  AMMON called for pt having sensation of "elephant on chest." Chest pressure began about an hour prior to evaluation, with pressure noted on his chest and abdomen. After 2mg Ativan, pt states that the pressure had decreased and he was feeling slightly better. Pt denying chest pain, SOB, edema, headache, diaphoresis, N/V/D, visual changes, or confusion. CIWA score is a 3. Pt states that he has a history of panic attacks, having about 10 previously with this feeling similar to previous panic attacks. EKG performed and was normal.       T(C): 36.5 (07-17-22 @ 07:56), Max: 36.6 (07-16-22 @ 14:18)  HR: 96 (07-17-22 @ 13:25) (58 - 96)  BP: 102/62 (07-17-22 @ 13:25) (102/62 - 115/79)  RR: 18 (07-17-22 @ 13:25) (18 - 18)  SpO2: 100% (07-17-22 @ 13:25) (100% - 100%)    Physical Exam:  Gen: Patient laying on hospital bed, visibly anxious but in NAD  HEENT: NC/AT,  EOMI.    Resp: CTA b/l. No wheezes, ronchi, or crackles.   CV: Radial pulses 2+ b/l, RRR, no murmurs.   Abd: tenderness with palpation, soft, no guarding or rigidity. NABS.    MSK: increased pressure sensation with palpation of chest and abdomen, ROM intact, no clubbing, edema, or cyanosis.   Neuro: awake, alert, grossly oriented.     Assessment:  AMMON called for chest pressure. Pt currently clinically stable, EKG normal, vitals WNL, and improving with Ativan with a history of panic attacks with similar presentations previously.    Plan:  1. one time dose of Ativan 2mg   2. EKG ordered - WNL  3. will continue to monitor routinely with follow up with AMMON if pain has not improved   4. d/w RN staff

## 2022-07-18 DIAGNOSIS — F11.20 OPIOID DEPENDENCE, UNCOMPLICATED: ICD-10-CM

## 2022-07-18 DIAGNOSIS — F19.10 OTHER PSYCHOACTIVE SUBSTANCE ABUSE, UNCOMPLICATED: ICD-10-CM

## 2022-07-18 PROCEDURE — 90853 GROUP PSYCHOTHERAPY: CPT

## 2022-07-18 PROCEDURE — 99232 SBSQ HOSP IP/OBS MODERATE 35: CPT

## 2022-07-18 RX ORDER — DIPHENHYDRAMINE HCL 50 MG
50 CAPSULE ORAL AT BEDTIME
Refills: 0 | Status: DISCONTINUED | OUTPATIENT
Start: 2022-07-18 | End: 2022-07-18

## 2022-07-18 RX ORDER — RISPERIDONE 4 MG/1
1 TABLET ORAL AT BEDTIME
Refills: 0 | Status: DISCONTINUED | OUTPATIENT
Start: 2022-07-18 | End: 2022-07-19

## 2022-07-18 RX ORDER — LANOLIN ALCOHOL/MO/W.PET/CERES
5 CREAM (GRAM) TOPICAL ONCE
Refills: 0 | Status: COMPLETED | OUTPATIENT
Start: 2022-07-18 | End: 2022-07-18

## 2022-07-18 RX ORDER — DIPHENHYDRAMINE HCL 50 MG
50 CAPSULE ORAL ONCE
Refills: 0 | Status: COMPLETED | OUTPATIENT
Start: 2022-07-18 | End: 2022-07-18

## 2022-07-18 RX ADMIN — Medication 4 MILLIGRAM(S): at 20:44

## 2022-07-18 RX ADMIN — Medication 4 MILLIGRAM(S): at 03:55

## 2022-07-18 RX ADMIN — Medication 2 MILLIGRAM(S): at 23:53

## 2022-07-18 RX ADMIN — Medication 2 MILLIGRAM(S): at 16:54

## 2022-07-18 RX ADMIN — Medication 2 MILLIGRAM(S): at 12:07

## 2022-07-18 RX ADMIN — Medication 4 MILLIGRAM(S): at 12:07

## 2022-07-18 RX ADMIN — RISPERIDONE 1 MILLIGRAM(S): 4 TABLET ORAL at 20:43

## 2022-07-18 RX ADMIN — Medication 2 MILLIGRAM(S): at 08:04

## 2022-07-18 RX ADMIN — Medication 50 MILLIGRAM(S): at 22:24

## 2022-07-18 RX ADMIN — Medication 5 MILLIGRAM(S): at 02:14

## 2022-07-18 RX ADMIN — Medication 2 MILLIGRAM(S): at 20:42

## 2022-07-18 NOTE — BH INPATIENT PSYCHIATRY PROGRESS NOTE - NSBHASSESSSUMMFT_PSY_ALL_CORE
The patient is a 32-year-old man with a history of polysubstance use disorder (Percocet, Xanax, marijuana) presenting with symptoms of psychosis and lito in the context of substance use (THC, Percocet, possibly Suboxone, possibly Xanax) and lack of outpatient treatment.     On exam, the patient is no longer disorganized or paranoid without evidence of catatonic behaviors. Marked improvement from last week. Patient at this time without any symptoms of EtOH/benzo or opioid withdrawal and vitals are noted to be stable. Symptoms are likely the result of agitated catatonia in the context of substance-induced psychosis vs Bipolar I Disorder (current episode manic with psychosis).    Plan:  -	discontinue CO 1:1  -	routine vitals  -	continue lorazepam 2mg PO QID  -	strict monitoring of I/Os  -	CIWA protocol with PRN lorazepam  -	CINA protocol   The patient is a 32-year-old man with a history of polysubstance use disorder (Percocet, Xanax, marijuana) presenting with symptoms of psychosis and lito in the context of substance use (THC, Percocet, possibly Suboxone, possibly Xanax) and lack of outpatient treatment.     On exam, the patient is no longer disorganized or paranoid without evidence of catatonic behaviors. Marked improvement from last week. Patient at this time without any symptoms of EtOH/benzo or opioid withdrawal and vitals are noted to be stable. Symptoms are likely the result of agitated catatonia in the context of substance-induced psychosis vs Bipolar I Disorder (current episode manic with psychosis).    Plan:  -	discontinue CO 1:1  -	routine vitals  -	continue lorazepam 2mg PO QID with plan to begin taper tomorrow  	start risperidone tonight, given multiple instances of similar presentation would seem to indicate some underlying vulnerability that could be addressed 	with pharmacotherapy.   -	strict monitoring of I/Os  -	CIWA protocol with PRN lorazepam  -	CINA protocol

## 2022-07-18 NOTE — BH INPATIENT PSYCHIATRY PROGRESS NOTE - CURRENT MEDICATION
MEDICATIONS  (STANDING):  LORazepam     Tablet 2 milliGRAM(s) Oral four times a day    MEDICATIONS  (PRN):  cloNIDine 0.1 milliGRAM(s) Oral every 4 hours PRN Withdrawal symptoms  loperamide 2 milliGRAM(s) Oral every 4 hours PRN After each loose stool as needed for diarrhea  LORazepam     Tablet 2 milliGRAM(s) Oral every 2 hours PRN CIWA score increase by 2 points and current CIWA score GREATER THAN 9  LORazepam     Tablet 2 milliGRAM(s) Oral every 4 hours PRN agitation  LORazepam   Injectable 2 milliGRAM(s) IntraMuscular once PRN severe agitation  nicotine  Polacrilex Gum 4 milliGRAM(s) Oral every 2 hours PRN nicotine withdrawal  ondansetron    Tablet 4 milliGRAM(s) Oral every 8 hours PRN nausea   MEDICATIONS  (STANDING):  LORazepam     Tablet 2 milliGRAM(s) Oral four times a day  risperiDONE   Tablet 1 milliGRAM(s) Oral at bedtime    MEDICATIONS  (PRN):  cloNIDine 0.1 milliGRAM(s) Oral every 4 hours PRN Withdrawal symptoms  loperamide 2 milliGRAM(s) Oral every 4 hours PRN After each loose stool as needed for diarrhea  LORazepam     Tablet 2 milliGRAM(s) Oral every 2 hours PRN CIWA score increase by 2 points and current CIWA score GREATER THAN 9  LORazepam     Tablet 2 milliGRAM(s) Oral every 4 hours PRN agitation  LORazepam   Injectable 2 milliGRAM(s) IntraMuscular once PRN severe agitation  nicotine  Polacrilex Gum 4 milliGRAM(s) Oral every 2 hours PRN nicotine withdrawal  ondansetron    Tablet 4 milliGRAM(s) Oral every 8 hours PRN nausea

## 2022-07-18 NOTE — BH INPATIENT PSYCHIATRY PROGRESS NOTE - NSBHCHARTREVIEWVS_PSY_A_CORE FT
Vital Signs Last 24 Hrs  T(C): 36.7 (07-18-22 @ 06:47), Max: 36.9 (07-17-22 @ 21:44)  T(F): 98 (07-18-22 @ 06:47), Max: 98.5 (07-17-22 @ 21:44)  HR: 96 (07-17-22 @ 13:25) (96 - 96)  BP: 102/62 (07-17-22 @ 13:25) (102/62 - 102/62)  BP(mean): --  RR: 18 (07-17-22 @ 13:25) (18 - 18)  SpO2: 100% (07-17-22 @ 13:25) (100% - 100%)    Orthostatic VS  07-18-22 @ 06:47  Lying BP: --/-- HR: --  Sitting BP: 108/69 HR: 66  Standing BP: --/-- HR: --  Site: --  Mode: --   Vital Signs Last 24 Hrs  T(C): 36.7 (07-18-22 @ 06:47), Max: 36.9 (07-17-22 @ 21:44)  T(F): 98 (07-18-22 @ 06:47), Max: 98.5 (07-17-22 @ 21:44)  HR: --  BP: --  BP(mean): --  RR: --  SpO2: --    Orthostatic VS  07-18-22 @ 06:47  Lying BP: --/-- HR: --  Sitting BP: 108/69 HR: 66  Standing BP: --/-- HR: --  Site: --  Mode: --
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
## 2022-07-19 LAB
A1C WITH ESTIMATED AVERAGE GLUCOSE RESULT: 5 % — SIGNIFICANT CHANGE UP (ref 4–5.6)
CHOLEST SERPL-MCNC: 108 MG/DL — SIGNIFICANT CHANGE UP
ESTIMATED AVERAGE GLUCOSE: 97 — SIGNIFICANT CHANGE UP
HDLC SERPL-MCNC: 46 MG/DL — SIGNIFICANT CHANGE UP
LIPID PNL WITH DIRECT LDL SERPL: 56 MG/DL — SIGNIFICANT CHANGE UP
NON HDL CHOLESTEROL: 62 MG/DL — SIGNIFICANT CHANGE UP
TRIGL SERPL-MCNC: 29 MG/DL — SIGNIFICANT CHANGE UP

## 2022-07-19 PROCEDURE — 90853 GROUP PSYCHOTHERAPY: CPT

## 2022-07-19 PROCEDURE — 99232 SBSQ HOSP IP/OBS MODERATE 35: CPT | Mod: GC

## 2022-07-19 RX ORDER — MIRTAZAPINE 45 MG/1
15 TABLET, ORALLY DISINTEGRATING ORAL AT BEDTIME
Refills: 0 | Status: DISCONTINUED | OUTPATIENT
Start: 2022-07-19 | End: 2022-07-22

## 2022-07-19 RX ORDER — NICOTINE POLACRILEX 2 MG
4 GUM BUCCAL
Refills: 0 | Status: DISCONTINUED | OUTPATIENT
Start: 2022-07-19 | End: 2022-07-22

## 2022-07-19 RX ADMIN — Medication 3 MILLIGRAM(S): at 20:25

## 2022-07-19 RX ADMIN — Medication 4 MILLIGRAM(S): at 18:18

## 2022-07-19 RX ADMIN — Medication 4 MILLIGRAM(S): at 09:09

## 2022-07-19 RX ADMIN — MIRTAZAPINE 15 MILLIGRAM(S): 45 TABLET, ORALLY DISINTEGRATING ORAL at 20:25

## 2022-07-19 RX ADMIN — Medication 4 MILLIGRAM(S): at 20:48

## 2022-07-19 RX ADMIN — Medication 2 MILLIGRAM(S): at 09:06

## 2022-07-19 RX ADMIN — Medication 2 MILLIGRAM(S): at 17:39

## 2022-07-19 RX ADMIN — Medication 2 MILLIGRAM(S): at 13:04

## 2022-07-19 RX ADMIN — Medication 0.1 MILLIGRAM(S): at 22:41

## 2022-07-19 RX ADMIN — Medication 4 MILLIGRAM(S): at 11:37

## 2022-07-19 NOTE — BH INPATIENT PSYCHIATRY PROGRESS NOTE - NSBHMETABOLIC_PSY_ALL_CORE_FT
BMI:   HbA1c: A1C with Estimated Average Glucose Result: 5.0 % (07-19-22 @ 08:52)    Glucose:   BP: 102/62 (07-17-22 @ 13:25) (102/62 - 115/79)  Lipid Panel: Date/Time: 07-19-22 @ 08:52  Cholesterol, Serum: 108  Direct LDL: --  HDL Cholesterol, Serum: 46  Total Cholesterol/HDL Ration Measurement: --  Triglycerides, Serum: 29

## 2022-07-19 NOTE — BH INPATIENT PSYCHIATRY PROGRESS NOTE - MSE UNSTRUCTURED FT
The patient is a cis man wearing street clothes. He is adequately groomed. Eye contact was good. He speaks at a normative volume and tone. He says his mood is “good.” Affect is congruent, euthymic, full. His thought content was focused on sleep and processing events of last week. Denies SIIP and HIIP. He denies AH and VH. Insight is fair. Judgment is fair. Impulse control was appropriate to setting.

## 2022-07-19 NOTE — BH INPATIENT PSYCHIATRY PROGRESS NOTE - NSBHASSESSSUMMFT_PSY_ALL_CORE
The patient is a 32-year-old man with a history of polysubstance use disorder (Percocet, Xanax, marijuana) presenting with symptoms of psychosis and lito in the context of substance use (THC, Percocet, possibly Suboxone, possibly Xanax) and lack of outpatient treatment.     Patient continues to exhibit clear thoughts and behaviors. At this time without any overt symptoms of EtOH/benzo or opioid withdrawal. Vitals are noted to be stable. Presentation last week likely due to agitated catatonia in the context of benzo withdrawal vs. substance-induced psychosis vs Bipolar I Disorder (current episode manic with psychosis).    Plan:  -	routine vitals  - discontinue risperidone  -	modify lorazepam PO QID to [id number]   -	strict monitoring of I/Os  -	CIWA protocol with PRN lorazepam  -	CINA protocol   The patient is a 32-year-old man with a history of polysubstance use disorder (Percocet, Xanax, marijuana) presenting with symptoms of psychosis and lito in the context of substance use (THC, Percocet, possibly Suboxone, possibly Xanax) and lack of outpatient treatment.     Patient continues to exhibit clear thoughts and behaviors. At this time without any overt symptoms of EtOH/benzo or opioid withdrawal, though on high doses of Ativan likely controlling sx at this time. Vitals are noted to be stable. Presentation last week likely due to benzo withdrawal vs. substance-induced psychosis vs Bipolar I Disorder (current episode manic with psychosis). Given resolution in sx on only Ativan, likely withdrawal syndrome rather than primary mood or psychotic d/o.     Plan:  -	routine vitals  - discontinue risperidone  -	modify lorazepam PO QID to [id number]   -	Will discuss mirtazepine with patient, but seeking some diagnostic clarity as to whether sleep disturbance is related to bzd withdrawal, will observe sleep 	with increased dose of Ativan at bedtime  -	strict monitoring of I/Os  -	CIWA protocol with PRN lorazepam  -	CINA protocol  Dispo: patient opposed to inpatient rehab at this time, amenable to outpatient substance tx, will continue to engage patient with motivational interviewing to assess willingness to engage in tx, complicated dispo due to ETOH, opiate, bzd abuse, given concerns for discharging patient with high dose bzds and risk of death and respiratory suppression if misused.

## 2022-07-19 NOTE — BH INPATIENT PSYCHIATRY PROGRESS NOTE - NSBHFUPINTERVALHXFT_PSY_A_CORE
No acute events overnight. Doing well off 1:1. Continues to exhibit clear, linear thinking with grounding in reality. No evidence of disorganization, paranoia, or perceptual disturbances. Reports sleeping and eating poorly. Denies persecutory delusions, SIIP, and HIIP. He is still unable to pinpoint a change leading to presentation but notes that he decreased his use of benzodiazepines and opioids the weeks leading up to admission. Patient seen for f/u for psychosis, chart reviewed, case discussed in team. No acute events overnight. Doing well off 1:1. Continues to exhibit clear, linear thinking with grounding in reality, though with limited insight into substance use and severity of substance use. No evidence of disorganization, paranoia, or perceptual disturbances. Reports sleeping and eating poorly. Denies persecutory delusions, SIIP, and HIIP. He is still unable to pinpoint a change leading to presentation but notes that he decreased his use of benzodiazepines and opioids the weeks leading up to admission. Did admit to taking 5 "bars" (2mg doses) at times. Barely perceptible tremor to touch in fingers, otherwise no apparent signs/sx of withdrawal.

## 2022-07-19 NOTE — BH INPATIENT PSYCHIATRY PROGRESS NOTE - NSBHCHARTREVIEWVS_PSY_A_CORE FT
Vital Signs Last 24 Hrs  T(C): 36.9 (07-19-22 @ 08:22), Max: 36.9 (07-19-22 @ 08:22)  T(F): 98.4 (07-19-22 @ 08:22), Max: 98.4 (07-19-22 @ 08:22)  HR: --  BP: --  BP(mean): --  RR: --  SpO2: --    Orthostatic VS  07-19-22 @ 08:22  Lying BP: --/-- HR: --  Sitting BP: 126/81 HR: 61  Standing BP: --/-- HR: --  Site: upper left arm  Mode: electronic  Orthostatic VS  07-18-22 @ 06:47  Lying BP: --/-- HR: --  Sitting BP: 108/69 HR: 66  Standing BP: --/-- HR: --  Site: --  Mode: --

## 2022-07-19 NOTE — BH INPATIENT PSYCHIATRY PROGRESS NOTE - CURRENT MEDICATION
MEDICATIONS  (STANDING):  LORazepam     Tablet 2 milliGRAM(s) Oral <User Schedule>  LORazepam     Tablet 1 milliGRAM(s) Oral <User Schedule>  LORazepam     Tablet 3 milliGRAM(s) Oral <User Schedule>  LORazepam     Tablet 2 milliGRAM(s) Oral once    MEDICATIONS  (PRN):  cloNIDine 0.1 milliGRAM(s) Oral every 4 hours PRN Withdrawal symptoms  loperamide 2 milliGRAM(s) Oral every 4 hours PRN After each loose stool as needed for diarrhea  LORazepam   Injectable 2 milliGRAM(s) IntraMuscular once PRN severe agitation  nicotine  Polacrilex Gum 4 milliGRAM(s) Oral every 2 hours PRN nicotine withdrawal  ondansetron    Tablet 4 milliGRAM(s) Oral every 8 hours PRN nausea

## 2022-07-20 PROCEDURE — 99232 SBSQ HOSP IP/OBS MODERATE 35: CPT

## 2022-07-20 RX ADMIN — Medication 4 MILLIGRAM(S): at 16:36

## 2022-07-20 RX ADMIN — Medication 30 MILLIGRAM(S): at 20:14

## 2022-07-20 RX ADMIN — Medication 4 MILLIGRAM(S): at 09:15

## 2022-07-20 RX ADMIN — Medication 4 MILLIGRAM(S): at 23:35

## 2022-07-20 RX ADMIN — Medication 2 MILLIGRAM(S): at 12:41

## 2022-07-20 RX ADMIN — MIRTAZAPINE 15 MILLIGRAM(S): 45 TABLET, ORALLY DISINTEGRATING ORAL at 20:13

## 2022-07-20 RX ADMIN — Medication 1 MILLIGRAM(S): at 23:35

## 2022-07-20 RX ADMIN — Medication 1 MILLIGRAM(S): at 08:10

## 2022-07-20 NOTE — BH INPATIENT PSYCHIATRY PROGRESS NOTE - NSBHASSESSSUMMFT_PSY_ALL_CORE
The patient is a 32-year-old man with a history of polysubstance use disorder (Percocet, Xanax, marijuana) presenting with symptoms of psychosis and lito in the context of substance use (THC, Percocet, possibly Suboxone, possibly Xanax) and lack of outpatient treatment.     Patient continues to exhibit clear thoughts and behaviors. At this time without any overt symptoms of EtOH/benzo or opioid withdrawal, though on high doses of Ativan likely controlling sx at this time. Vitals are noted to be stable. Presentation last week likely due to benzo withdrawal vs. substance-induced psychosis vs Bipolar I Disorder (current episode manic with psychosis). Given resolution in sx on only Ativan, likely withdrawal syndrome rather than primary mood or psychotic d/o.     Plan:  -	routine vitals  -	Discussed management of bzd withdrawal with outpatient substance MD, will convert Ativan to Librium 30mg TID with additional PRN Ativan of 1mg for 	anxiety or insomnia and 2mg for signs and sx of bzd withdrawal or increase in CIWA score.    -	Mirtazepine 15mg QHS  -	strict monitoring of I/Os  -	CIWA protocol with PRN lorazepam  -	CINA protocol  Dispo: patient opposed to inpatient rehab at this time, amenable to outpatient substance tx, will continue to engage patient with motivational interviewing, amenable to outpatient tx will proceed with referrals and plan for family meeting with mother prior to dc.

## 2022-07-20 NOTE — BH INPATIENT PSYCHIATRY PROGRESS NOTE - NSBHMETABOLIC_PSY_ALL_CORE_FT
BMI:   HbA1c: A1C with Estimated Average Glucose Result: 5.0 % (07-19-22 @ 08:52)    Glucose:   BP: 125/82 (07-19-22 @ 22:42) (125/82 - 125/82)  Lipid Panel: Date/Time: 07-19-22 @ 08:52  Cholesterol, Serum: 108  Direct LDL: --  HDL Cholesterol, Serum: 46  Total Cholesterol/HDL Ration Measurement: --  Triglycerides, Serum: 29

## 2022-07-20 NOTE — BH INPATIENT PSYCHIATRY PROGRESS NOTE - NSBHFUPINTERVALHXFT_PSY_A_CORE
Patient seen for f/u for psychosis, chart reviewed, case discussed in team. No acute events overnight. Patient continues with sustained improvements with Ativan, no evidence of withdrawal syndrome at this time, patient has been adherent with meds, denies side effects, reports he slept well last night. No evidence of disorganization, paranoia, or perceptual disturbances. Patient with improved appetite this AM. Denies persecutory delusions, SIIP, and HIIP. Patient appears sincerely engaged in tx and in developing aftercare plan to target substance use. Discussed tx with Mother after patient provided consent. Will attempt to hold family meeting given mother has reservations and concerns given patients history, however she also recognizes that outpatient substance tx if patient is motivated to engage represents the best option to continue to build on gains from inpatient. Discussed conversion to Librium from Ativan with patient who was agreeable to plan.

## 2022-07-20 NOTE — BH INPATIENT PSYCHIATRY PROGRESS NOTE - NSBHCHARTREVIEWVS_PSY_A_CORE FT
Vital Signs Last 24 Hrs  T(C): 36.3 (07-20-22 @ 07:51), Max: 36.3 (07-20-22 @ 07:51)  T(F): 97.4 (07-20-22 @ 07:51), Max: 97.4 (07-20-22 @ 07:51)  HR: 120 (07-19-22 @ 22:42) (120 - 120)  BP: 125/82 (07-19-22 @ 22:42) (125/82 - 125/82)  BP(mean): --  RR: --  SpO2: --    Orthostatic VS  07-20-22 @ 07:51  Lying BP: --/-- HR: --  Sitting BP: 100/60 HR: 54  Standing BP: --/-- HR: --  Site: --  Mode: --  Orthostatic VS  07-19-22 @ 08:22  Lying BP: --/-- HR: --  Sitting BP: 126/81 HR: 61  Standing BP: --/-- HR: --  Site: upper left arm  Mode: electronic

## 2022-07-20 NOTE — BH INPATIENT PSYCHIATRY PROGRESS NOTE - MSE UNSTRUCTURED FT
The patient is a cis man wearing street clothes. He is adequately groomed. Eye contact was good. He speaks at a normative volume and tone. He says his mood is “good.” Affect is congruent, euthymic, full. His thought content is appropriately focused on aftercare planning, Denies SIIP and HIIP. He denies AH and VH. Insight is fair-good. Judgment is improved. Impulse control was appropriate to setting.

## 2022-07-21 PROCEDURE — 90853 GROUP PSYCHOTHERAPY: CPT

## 2022-07-21 PROCEDURE — 99232 SBSQ HOSP IP/OBS MODERATE 35: CPT | Mod: GC

## 2022-07-21 RX ORDER — DIPHENHYDRAMINE HCL 50 MG
25 CAPSULE ORAL ONCE
Refills: 0 | Status: COMPLETED | OUTPATIENT
Start: 2022-07-21 | End: 2022-07-21

## 2022-07-21 RX ADMIN — Medication 2 MILLIGRAM(S): at 23:25

## 2022-07-21 RX ADMIN — Medication 4 MILLIGRAM(S): at 18:21

## 2022-07-21 RX ADMIN — MIRTAZAPINE 15 MILLIGRAM(S): 45 TABLET, ORALLY DISINTEGRATING ORAL at 20:02

## 2022-07-21 RX ADMIN — Medication 30 MILLIGRAM(S): at 20:02

## 2022-07-21 RX ADMIN — Medication 30 MILLIGRAM(S): at 09:58

## 2022-07-21 RX ADMIN — Medication 25 MILLIGRAM(S): at 01:29

## 2022-07-21 RX ADMIN — Medication 0.1 MILLIGRAM(S): at 00:30

## 2022-07-21 NOTE — BH INPATIENT PSYCHIATRY PROGRESS NOTE - NSBHCHARTREVIEWVS_PSY_A_CORE FT
Vital Signs Last 24 Hrs  T(C): 36.5 (07-21-22 @ 09:56), Max: 36.5 (07-21-22 @ 09:56)  T(F): 97.7 (07-21-22 @ 09:56), Max: 97.7 (07-21-22 @ 09:56)  HR: 89 (07-21-22 @ 09:56) (89 - 89)  BP: 96/79 (07-21-22 @ 09:56) (96/79 - 96/79)  BP(mean): --  RR: --  SpO2: --    Orthostatic VS  07-20-22 @ 07:51  Lying BP: --/-- HR: --  Sitting BP: 100/60 HR: 54  Standing BP: --/-- HR: --  Site: --  Mode: --

## 2022-07-21 NOTE — BH INPATIENT PSYCHIATRY PROGRESS NOTE - CURRENT MEDICATION
MEDICATIONS  (STANDING):  LORazepam     Tablet 2 milliGRAM(s) Oral once  mirtazapine 15 milliGRAM(s) Oral at bedtime    MEDICATIONS  (PRN):  cloNIDine 0.1 milliGRAM(s) Oral every 4 hours PRN Withdrawal symptoms  loperamide 2 milliGRAM(s) Oral every 4 hours PRN After each loose stool as needed for diarrhea  LORazepam     Tablet 1 milliGRAM(s) Oral every 6 hours PRN anxiety  LORazepam     Tablet 2 milliGRAM(s) Oral every 6 hours PRN Benzo withdrawal symptoms  LORazepam   Injectable 2 milliGRAM(s) IntraMuscular once PRN severe agitation  nicotine  Polacrilex Gum 4 milliGRAM(s) Oral every 2 hours PRN Cravings  ondansetron    Tablet 4 milliGRAM(s) Oral every 8 hours PRN nausea   MEDICATIONS  (STANDING):  chlordiazePOXIDE 30 milliGRAM(s) Oral two times a day  LORazepam     Tablet 2 milliGRAM(s) Oral once  mirtazapine 15 milliGRAM(s) Oral at bedtime    MEDICATIONS  (PRN):  cloNIDine 0.1 milliGRAM(s) Oral every 4 hours PRN Withdrawal symptoms  loperamide 2 milliGRAM(s) Oral every 4 hours PRN After each loose stool as needed for diarrhea  LORazepam     Tablet 1 milliGRAM(s) Oral every 6 hours PRN anxiety  LORazepam     Tablet 2 milliGRAM(s) Oral every 6 hours PRN Benzo withdrawal symptoms  LORazepam   Injectable 2 milliGRAM(s) IntraMuscular once PRN severe agitation  nicotine  Polacrilex Gum 4 milliGRAM(s) Oral every 2 hours PRN Cravings  ondansetron    Tablet 4 milliGRAM(s) Oral every 8 hours PRN nausea

## 2022-07-21 NOTE — BH INPATIENT PSYCHIATRY PROGRESS NOTE - NSBHFUPINTERVALHXFT_PSY_A_CORE
Patient seen for f/u for psychosis, chart reviewed, case discussed in team. No acute events overnight. Patient doing well on chlordiazepoxide but reports difficulties falling asleep that improve upon taking clonidine. No evidence of disorganization, paranoia, or perceptual disturbances. Denies persecutory delusions, SIIP, and HIIP. Patient expressed concern that his treatment is focusing on benzos when he believes his issue to be opioids.     Held family meeting with mother and sister. They pushed for inpatient substance rehabilitation, which the patient is opposed to. Patient became emotional at the continue discussion of inpatient options including inpatient detox but seemed amenable to an inpatient detox program. After meeting, patient expressed a desire to continue tapering chlordiazepoxide and an interest in starting Suboxone. Patient seen for f/u for psychosis, chart reviewed, case discussed in team. No acute events overnight. Patient doing well on chlordiazepoxide but reports difficulties falling asleep that improve upon taking clonidine. No evidence of disorganization, paranoia, or perceptual disturbances. Denies persecutory delusions, SIIP, and HIIP. Patient expressed concern that his treatment is focusing on benzos when he believes his issue to be opioids.     Held family meeting with mother and sister. They pushed for inpatient substance rehabilitation, which the patient is opposed to. Patient became emotional at the continue discussion of inpatient options including inpatient detox but seemed amenable to an inpatient detox program. After meeting, patient expressed a desire to continue tapering chlordiazepoxide and an interest in starting Suboxone, however he was unsure as to whether or not he wanted to stay in the hospital over weekend so that suboxone could be started inpatient. In interest of tapering Librium to lower dose prior to dc, patient was reduced to BID 30mg today, patient was assessed in person and vitals were taken at 5PM and vitals were WNL, thus no evident withdrawal sx apparent.

## 2022-07-21 NOTE — BH INPATIENT PSYCHIATRY PROGRESS NOTE - MSE UNSTRUCTURED FT
The patient is a cis man wearing street clothes. He is adequately groomed. Eye contact was good. He speaks at a normative volume and tone. He says his mood is “fine.” Affect is congruent, euthymic, full, although tearful at times during family meeting. His thought content is appropriately focused on aftercare planning. Denies SIIP and HIIP. He denies AH and VH. Insight is fair-good. Judgment is improved, although tenuous given chronicity of substance use disorder. Impulse control was appropriate to setting

## 2022-07-21 NOTE — BH INPATIENT PSYCHIATRY PROGRESS NOTE - NSBHMETABOLIC_PSY_ALL_CORE_FT
BMI: BMI (kg/m2): 19.5 (07-21-22 @ 09:56)  HbA1c: A1C with Estimated Average Glucose Result: 5.0 % (07-19-22 @ 08:52)    Glucose:   BP: 96/79 (07-21-22 @ 09:56) (96/79 - 125/82)  Lipid Panel: Date/Time: 07-19-22 @ 08:52  Cholesterol, Serum: 108  Direct LDL: --  HDL Cholesterol, Serum: 46  Total Cholesterol/HDL Ration Measurement: --  Triglycerides, Serum: 29

## 2022-07-21 NOTE — BH INPATIENT PSYCHIATRY PROGRESS NOTE - NSBHASSESSSUMMFT_PSY_ALL_CORE
The patient is a 32-year-old man with a history of polysubstance use disorder (Percocet, Xanax, marijuana) presenting with symptoms of psychosis and lito in the context of substance use (THC, Percocet, possibly Suboxone, possibly Xanax) and lack of outpatient treatment.     Patient continues to exhibit clear thoughts and behaviors. At this time, he is not displaying any overt symptoms of EtOH/benzo or opioid withdrawal, although the chlordiazepoxide may be masking these symptoms. His vitals are noted to be stable. Presentation last week likely due to benzo withdrawal vs. opioid withdrawal delirium vs. substance-induced psychosis vs Bipolar I Disorder (current episode manic with psychosis). Leading diagnoses are that of benzo and opioid withdrawal given resolution of symptoms on benzodiazepines and improvement on clonidine.    Plan:  - routine vitals  - continue chlordiazepoxide 30mg TID   - discontinue PRN lorazepam outside of CIWA protocol  - continue mirtazepine 15mg qhs  - strict monitoring of I/Os  - CIWA protocol with PRN lorazepam  - CINA protocol    Dispo:  -	Given patient opposition to inpatient rehab, outpatient substance program is likely his disposition, although he is amenable to inpatient detox programs   The patient is a 32-year-old man with a history of polysubstance use disorder (Percocet, Xanax, marijuana) presenting with symptoms of psychosis and lito in the context of substance use (THC, Percocet, possibly Suboxone, possibly Xanax) and lack of outpatient treatment.     Patient continues to exhibit clear thoughts and behaviors. At this time, he is not displaying any overt symptoms of EtOH/benzo or opioid withdrawal, although the chlordiazepoxide may be masking these symptoms. His vitals are noted to be stable. Presentation last week likely due to benzo withdrawal vs. opioid withdrawal delirium vs. substance-induced psychosis. Leading diagnoses are that of benzo and opioid withdrawal delirium given resolution of symptoms on benzodiazepines and improvement on clonidine. Discussed risks of tapering bzd with patient including seizure, recurrence of sx and delirium, patient still with preference to get to a lower dose prior to dc, thus will attempt to lower dose and consolidate to BID, as patient reports he does not feel his bzd use was truly as much or as consistent that withdrawal would be as complicated as that which was observed. Patient noted he would previously use 1-2mg of xanax but not daily.     Plan:  - Vitals BID  - continue chlordiazepoxide 30mg BID  - continue mirtazepine 15mg qhs  - strict monitoring of I/Os  - CIWA protocol with PRN lorazepam  - CINA protocol    Dispo:  -	Given patient opposition to inpatient rehab, outpatient substance program is likely his disposition, although he is amenable to inpatient detox programs  -           Patient accepted to [facility] intake on Wed 7/27.

## 2022-07-21 NOTE — BH TREATMENT PLAN - NSTXDCOPLKINTERSW_PSY_ALL_CORE
Pt would benefit from outpt services to maintain stability and enhance organized behaviors and interactions.
Pt would benefit from outpt services to maintain stability and enhance organized behaviors and interactions.

## 2022-07-21 NOTE — BH TREATMENT PLAN - NSTXCAREGIVERPARTICIPATE_PSY_P_CORE
Family/Caregiver participated in identification of needs/problems/goals for treatment/Family/Caregiver participated in defining interventions
Family/Caregiver participated in identification of needs/problems/goals for treatment/Family/Caregiver participated in defining interventions/Family/Caregiver participated in development of after care plan

## 2022-07-21 NOTE — BH TREATMENT PLAN - NSTXSUBMISINTERMD_PSY_ALL_CORE
commitment to at least one month of sobriety; engagement with outpatient services; mother holding and distributing medications

## 2022-07-21 NOTE — BH TREATMENT PLAN - NSTXPATIENTPARTICIPATE_PSY_ALL_CORE
Patient participated in identification of needs/problems/goals for treatment/Patient participated in defining interventions/Patient participated in development of after care plan
No, patient unwilling to participate

## 2022-07-22 VITALS — TEMPERATURE: 98 F

## 2022-07-22 PROCEDURE — 90853 GROUP PSYCHOTHERAPY: CPT

## 2022-07-22 PROCEDURE — 99239 HOSP IP/OBS DSCHRG MGMT >30: CPT

## 2022-07-22 RX ORDER — MIRTAZAPINE 45 MG/1
1 TABLET, ORALLY DISINTEGRATING ORAL
Qty: 14 | Refills: 0
Start: 2022-07-22 | End: 2022-08-04

## 2022-07-22 RX ORDER — NICOTINE POLACRILEX 2 MG
1 GUM BUCCAL
Qty: 140 | Refills: 3
Start: 2022-07-22 | End: 2022-09-15

## 2022-07-22 RX ORDER — TRAZODONE HCL 50 MG
1 TABLET ORAL
Qty: 14 | Refills: 0
Start: 2022-07-22 | End: 2022-08-04

## 2022-07-22 RX ORDER — TRAZODONE HCL 50 MG
50 TABLET ORAL AT BEDTIME
Refills: 0 | Status: DISCONTINUED | OUTPATIENT
Start: 2022-07-22 | End: 2022-07-22

## 2022-07-22 RX ADMIN — Medication 30 MILLIGRAM(S): at 08:33

## 2022-07-22 RX ADMIN — Medication 4 MILLIGRAM(S): at 12:08

## 2022-07-22 RX ADMIN — Medication 4 MILLIGRAM(S): at 14:25

## 2022-07-22 NOTE — BH DISCHARGE NOTE NURSING/SOCIAL WORK/PSYCH REHAB - NSDCPEWEB_GEN_ALL_CORE
[facility] for Tobacco Control website --- [email or web address] website --- [email or web address]

## 2022-07-22 NOTE — BH INPATIENT PSYCHIATRY PROGRESS NOTE - NSICDXBHTERTIARYDX_PSY_ALL_CORE
R/O Psychoactive substance-induced psychosis   F19.959  R/O Psychosis   F29  

## 2022-07-22 NOTE — BH INPATIENT PSYCHIATRY PROGRESS NOTE - NSBHFUPINTERVALCCFT_PSY_A_CORE
f/u psychosis

## 2022-07-22 NOTE — BH INPATIENT PSYCHIATRY DISCHARGE NOTE - NSDCMRMEDTOKEN_GEN_ALL_CORE_FT
chlordiazePOXIDE 10 mg oral capsule: 3 cap(s) orally 2 times a day MDD:6 capsules (60mg)  mirtazapine 15 mg oral tablet: 1 tab(s) orally once a day (at bedtime)  Nicorette White Ice Mint 4 mg oral transmucosal gum: 1 gum chewed 10 times a day, As Needed   traZODone 50 mg oral tablet: 1 tab(s) orally once a day (at bedtime), As needed, Insomnia

## 2022-07-22 NOTE — BH DISCHARGE NOTE NURSING/SOCIAL WORK/PSYCH REHAB - NSDCPEEMAIL_GEN_ALL_CORE
[facility] for Tobacco Control email [email or web address]

## 2022-07-22 NOTE — BH INPATIENT PSYCHIATRY PROGRESS NOTE - NSDCCRITERIA_PSY_ALL_CORE
resolution of persecutory delusions and paranoia

## 2022-07-22 NOTE — BH DISCHARGE NOTE NURSING/SOCIAL WORK/PSYCH REHAB - NSDCPRGOAL_PSY_ALL_CORE
Writer met with pt. to safety plan for discharge and discuss the pt. progress throughout inpatient stay. Pt. was receptive to safety planning and was able to identify warning signs and coping skills with staff support. Pt. readily identified supportive social circles and professional help autonomously. Upon admission pt. presented with benzo withdrawal and was observed as confused, illogical, and disorganized. During inpatient stay pt. was visible on the unit, participated in some of the groups and was engaged in treatment. At discharge pt. presents as calm, logical, goal and future oriented and insightful. Pt. states that he has a sponsor and that he wants to do better and be better for his personal goals. Pt. met his goal of remaining organized in a conversation for 5 minutes as evidenced by pt. ability to remain linear and coherent throughout the discharge interview. Pt. denies SI/TH/AH/VH/HI. Pt. exhibits medication compliance, good ADLs, and behavioral control.

## 2022-07-22 NOTE — BH PSYCHOLOGY - GROUP THERAPY NOTE - NSPSYCHOLGRPGENGOAL_PSY_A_CORE
assessment/decrease symptoms/improve level of independent functioning/improve social/vocational/coping skills/psychoeducation/treatment compliance

## 2022-07-22 NOTE — BH INPATIENT PSYCHIATRY PROGRESS NOTE - CURRENT MEDICATION
MEDICATIONS  (STANDING):  chlordiazePOXIDE 30 milliGRAM(s) Oral two times a day  LORazepam     Tablet 2 milliGRAM(s) Oral once  mirtazapine 15 milliGRAM(s) Oral at bedtime    MEDICATIONS  (PRN):  cloNIDine 0.1 milliGRAM(s) Oral every 4 hours PRN Withdrawal symptoms  loperamide 2 milliGRAM(s) Oral every 4 hours PRN After each loose stool as needed for diarrhea  LORazepam     Tablet 1 milliGRAM(s) Oral every 6 hours PRN anxiety  LORazepam     Tablet 2 milliGRAM(s) Oral every 6 hours PRN Benzo withdrawal symptoms  LORazepam   Injectable 2 milliGRAM(s) IntraMuscular once PRN severe agitation  nicotine  Polacrilex Gum 4 milliGRAM(s) Oral every 2 hours PRN Cravings  ondansetron    Tablet 4 milliGRAM(s) Oral every 8 hours PRN nausea  traZODone 50 milliGRAM(s) Oral at bedtime PRN Insomnia

## 2022-07-22 NOTE — BH INPATIENT PSYCHIATRY PROGRESS NOTE - NSBHATTESTCOMMENTATTENDFT_PSY_A_CORE
Patient seen, chart reviewed, case discussed with team.  I saw the patient with the resident, discussed case with resident and reviewed all sections of the note, made changes where appropriate and agree with it as written.
Pt remains extremely disorganized, often agitated and at times aggressive (this morning trying to flip over tables in the day room), paranoid, tearful at random, very restless/pacing, exhibiting bizarre and non-goal-directed repetitive behaviors, has not slept in well over 48 hours, completely disoriented (able only to correctly state his name and age/birthday). Pt required many prns overnight (Ativan, Haldol, Benadryl, Atarax) and requiring near constant redirection from staff. Unable to engage in meaningful interview today due to disorganization, pt mostly incoherent and rambling. CIWA scores ranging from 5 - 10. Vitals have been stable aside from transient hypotension, responsive to PO/fluid intake (of note, no fever or tachycardia, O2 sat > 95%). DDx remains wide though agitated catatonia/delirious lito now appearing most likely (though EtOH/benzo/opioid/other substance withdrawal, substance-induced lito/psychosis, and possible primary psychotic vs mood disorder remain in ddx). Given leading ddx catatonia will d/c and avoid all antipsychotics for the time-being, avoid anticholinergics, and treat pt with Ativan only (start 2mg TID and administer prns as necessary). C/w CIWA protocol. Will watch vitals very closely and encourage hydration/PO intake. Will have low threshold for ED transfer if necessary.     
Pt remains extremely disorganized and paranoid though less agitated, less restless, and without any noticeable non-goal-directed repetitive behaviors. Slept only one hour last night and going on well over 72 hours without any sleep. Remains disoriented with fluctuating mental status though more oriented with prompting. Continuing to require Ativan prns. CIWA scores 4 and 5 today. Vitals have been stable. Pt with limited though some PO intake. DDx remains wide including catatonia/delirious lito, EtOH/benzo/opioid/other substance withdrawal, substance-induced lito/psychosis, and possible primary psychotic vs mood disorder. Given leading ddx catatonia will c/w Ativan treatment, though will titrate to 2mg QID and use prns as needed. Holding off on antipsychotics and will avoid anticholinergics. C/w CIWA protocol. Will continue to watch vitals very closely and encourage hydration/PO intake.   
Patient seen, chart reviewed, case discussed with team.  I saw the patient with the student, discussed case with student and reviewed all sections of the note, made changes where appropriate and agree with it as written.
Patient seen, chart reviewed, case discussed with team.  I saw the patient with the resident, discussed case with resident and reviewed all sections of the note, made changes where appropriate and agree with it as written.
Patient seen, chart reviewed, case discussed with team.  I saw the patient with the resident, discussed case with resident and reviewed all sections of the note, made changes where appropriate and agree with it as written.

## 2022-07-22 NOTE — BH INPATIENT PSYCHIATRY PROGRESS NOTE - NSBHATTESTBILLINGAW_PSY_A_CORE
[id number] Inpatient care - moderate complexity - 25 minutes
[id number]-Dvzjtukovr Inpatient care - moderate complexity - 25 minutes
[id number]-Byrkiydtit Inpatient care - moderate complexity - 25 minutes
[id number]-Aepcuctwzo Inpatient care - moderate complexity - 25 minutes
18601-Uxecmxghut Inpatient care - high complexity - 35 minutes
[id number]-Lnvmnlfdas Inpatient care - moderate complexity - 25 minutes
[id number]-Ywafcjewce Inpatient care - moderate complexity - 25 minutes
Non-billable
[id number]-Aajhphzpmm Inpatient care - moderate complexity - 25 minutes

## 2022-07-22 NOTE — BH DISCHARGE NOTE NURSING/SOCIAL WORK/PSYCH REHAB - NSCDUDCCRISIS_PSY_A_CORE
[facility] Well  [phone] [facility]-WELL [phone])  Text "WELL" to [phone]  Website: [email or web address] [facility] 1 [phone] 561-WRRX (7424) Website: www.safehorizon.org/.National Suicide Prevention Lifeline 8 (796) 066-1702/.  [facility]  1 (816) LIFENET (988-9237)/.  [facility]  [address] [place] [address] [phone]   Hours:  Monday through Friday from 9 AM to 3 PM [facility] Well  1 [phone] [facility]-WELL ([phone])  Text "WELL" to 15258  Website: www.ShopLocket/.[employer] [facility] 1 (212) 171-JJUK (1840) Website: www.safehorizon.org/.National Suicide Prevention Lifeline 8 (004) 045-1675/.  [employer]  1 (179) LIFENET (837-8286)/.  [facility]’s [facility]  [address] [place] [address] [phone]   Hours:  Monday through Friday from 9 AM to 3 PM/.  [employer] [facility] Crisis Line  [phone], Option 1

## 2022-07-22 NOTE — BH INPATIENT PSYCHIATRY PROGRESS NOTE - NSCGISEVERILLNESS_PSY_ALL_CORE
4 = Moderately ill – overt symptoms causing noticeable, but modest, functional impairment or distress; symptom level may warrant medication
3 = Mildly ill – clearly established symptoms with minimal, if any, distress or difficulty in social and occupational function
3 = Mildly ill – clearly established symptoms with minimal, if any, distress or difficulty in social and occupational function

## 2022-07-22 NOTE — BH INPATIENT PSYCHIATRY PROGRESS NOTE - NSBHFUPINTERVALHXFT_PSY_A_CORE
Patient seen for f/u for psychosis, chart reviewed, case discussed in team meeting. No acute events overnight. Patient doing well on chlordiazepoxide. No evidence of disorganization, paranoia, or perceptual disturbances. Denies persecutory delusions, SIIP, and HIIP. Vitals have been consistently stable. Discussion today focused on discharge planning and sobriety. He states that he plans to stay off all substances (THC, Percocet, tobacco, alcohol) and has AA and NA meetings lined up with connection to a sponsor of similar experience.  Patient seen on day of discharge, for follow up for psychosis possibly 2/2 bzd withdrawal delirium, >30 mins spent with patient, conducting interview, reviewing safety plan and aftercare plans, providing psychoeducation regarding illness monitoring, life style changes, and medications. Chart reviewed, case discussed in team meeting. No acute events overnight, patient slept well after Ativan 2mg PRN last night. Patient doing well on chlordiazepoxide. No evidence of disorganization, paranoia, or perceptual disturbances. Denies persecutory delusions, SIIP, and HIIP. Vitals have been consistently stable. Discussion today focused on discharge planning, safety planning, and sobriety. He states that he plans to stay off all substances (THC, Percocet, tobacco, alcohol) and has AA and NA meetings lined up with connection to a sponsor of similar experience. Spoke with mother prior to dc and conveyed dc recs.

## 2022-07-22 NOTE — BH INPATIENT PSYCHIATRY PROGRESS NOTE - PRN MEDS
MEDICATIONS  (PRN):  cloNIDine 0.1 milliGRAM(s) Oral every 4 hours PRN Withdrawal symptoms  loperamide 2 milliGRAM(s) Oral every 4 hours PRN After each loose stool as needed for diarrhea  LORazepam     Tablet 2 milliGRAM(s) Oral every 2 hours PRN CIWA score increase by 2 points and current CIWA score GREATER THAN 9  LORazepam     Tablet 2 milliGRAM(s) Oral every 4 hours PRN agitation  LORazepam   Injectable 2 milliGRAM(s) IntraMuscular once PRN severe agitation  nicotine  Polacrilex Gum 4 milliGRAM(s) Oral every 2 hours PRN nicotine withdrawal  ondansetron    Tablet 4 milliGRAM(s) Oral every 8 hours PRN nausea  
MEDICATIONS  (PRN):  cloNIDine 0.1 milliGRAM(s) Oral every 4 hours PRN Withdrawal symptoms  loperamide 2 milliGRAM(s) Oral every 4 hours PRN After each loose stool as needed for diarrhea  LORazepam     Tablet 2 milliGRAM(s) Oral every 2 hours PRN CIWA score increase by 2 points and current CIWA score GREATER THAN 9  LORazepam     Tablet 2 milliGRAM(s) Oral every 4 hours PRN agitation  LORazepam   Injectable 2 milliGRAM(s) IntraMuscular once PRN severe agitation  nicotine  Polacrilex Gum 4 milliGRAM(s) Oral every 2 hours PRN nicotine withdrawal  
MEDICATIONS  (PRN):  cloNIDine 0.1 milliGRAM(s) Oral every 4 hours PRN Withdrawal symptoms  loperamide 2 milliGRAM(s) Oral every 4 hours PRN After each loose stool as needed for diarrhea  LORazepam     Tablet 2 milliGRAM(s) Oral every 2 hours PRN CIWA score increase by 2 points and current CIWA score GREATER THAN 9  LORazepam     Tablet 2 milliGRAM(s) Oral every 4 hours PRN agitation  LORazepam   Injectable 2 milliGRAM(s) IntraMuscular once PRN severe agitation  nicotine  Polacrilex Gum 4 milliGRAM(s) Oral every 2 hours PRN nicotine withdrawal  ondansetron    Tablet 4 milliGRAM(s) Oral every 8 hours PRN nausea  
MEDICATIONS  (PRN):  cloNIDine 0.1 milliGRAM(s) Oral every 4 hours PRN Withdrawal symptoms  loperamide 2 milliGRAM(s) Oral every 4 hours PRN After each loose stool as needed for diarrhea  LORazepam   Injectable 2 milliGRAM(s) IntraMuscular once PRN severe agitation  nicotine  Polacrilex Gum 4 milliGRAM(s) Oral every 2 hours PRN nicotine withdrawal  ondansetron    Tablet 4 milliGRAM(s) Oral every 8 hours PRN nausea  
MEDICATIONS  (PRN):  cloNIDine 0.1 milliGRAM(s) Oral every 4 hours PRN Withdrawal symptoms  loperamide 2 milliGRAM(s) Oral every 4 hours PRN After each loose stool as needed for diarrhea  LORazepam     Tablet 2 milliGRAM(s) Oral every 2 hours PRN CIWA score increase by 2 points and current CIWA score GREATER THAN 9  LORazepam     Tablet 2 milliGRAM(s) Oral every 4 hours PRN agitation  LORazepam   Injectable 2 milliGRAM(s) IntraMuscular once PRN severe agitation  nicotine  Polacrilex Gum 4 milliGRAM(s) Oral every 2 hours PRN nicotine withdrawal  
MEDICATIONS  (PRN):  cloNIDine 0.1 milliGRAM(s) Oral every 4 hours PRN Withdrawal symptoms  loperamide 2 milliGRAM(s) Oral every 4 hours PRN After each loose stool as needed for diarrhea  LORazepam     Tablet 2 milliGRAM(s) Oral every 2 hours PRN CIWA score increase by 2 points and current CIWA score GREATER THAN 9  LORazepam     Tablet 2 milliGRAM(s) Oral every 4 hours PRN agitation  LORazepam   Injectable 2 milliGRAM(s) IntraMuscular once PRN severe agitation  nicotine  Polacrilex Gum 4 milliGRAM(s) Oral every 2 hours PRN nicotine withdrawal  ondansetron    Tablet 4 milliGRAM(s) Oral every 8 hours PRN nausea  
MEDICATIONS  (PRN):  cloNIDine 0.1 milliGRAM(s) Oral every 4 hours PRN Withdrawal symptoms  loperamide 2 milliGRAM(s) Oral every 4 hours PRN After each loose stool as needed for diarrhea  LORazepam     Tablet 1 milliGRAM(s) Oral every 6 hours PRN anxiety  LORazepam     Tablet 2 milliGRAM(s) Oral every 6 hours PRN Benzo withdrawal symptoms  LORazepam   Injectable 2 milliGRAM(s) IntraMuscular once PRN severe agitation  nicotine  Polacrilex Gum 4 milliGRAM(s) Oral every 2 hours PRN Cravings  ondansetron    Tablet 4 milliGRAM(s) Oral every 8 hours PRN nausea  
MEDICATIONS  (PRN):  cloNIDine 0.1 milliGRAM(s) Oral every 4 hours PRN Withdrawal symptoms  loperamide 2 milliGRAM(s) Oral every 4 hours PRN After each loose stool as needed for diarrhea  LORazepam     Tablet 1 milliGRAM(s) Oral every 6 hours PRN anxiety  LORazepam     Tablet 2 milliGRAM(s) Oral every 6 hours PRN Benzo withdrawal symptoms  LORazepam   Injectable 2 milliGRAM(s) IntraMuscular once PRN severe agitation  nicotine  Polacrilex Gum 4 milliGRAM(s) Oral every 2 hours PRN Cravings  ondansetron    Tablet 4 milliGRAM(s) Oral every 8 hours PRN nausea  
MEDICATIONS  (PRN):  cloNIDine 0.1 milliGRAM(s) Oral every 4 hours PRN Withdrawal symptoms  loperamide 2 milliGRAM(s) Oral every 4 hours PRN After each loose stool as needed for diarrhea  LORazepam     Tablet 1 milliGRAM(s) Oral every 6 hours PRN anxiety  LORazepam     Tablet 2 milliGRAM(s) Oral every 6 hours PRN Benzo withdrawal symptoms  LORazepam   Injectable 2 milliGRAM(s) IntraMuscular once PRN severe agitation  nicotine  Polacrilex Gum 4 milliGRAM(s) Oral every 2 hours PRN Cravings  ondansetron    Tablet 4 milliGRAM(s) Oral every 8 hours PRN nausea  traZODone 50 milliGRAM(s) Oral at bedtime PRN Insomnia

## 2022-07-22 NOTE — BH INPATIENT PSYCHIATRY PROGRESS NOTE - MSE UNSTRUCTURED FT
The patient is a cis man wearing street clothes. He is adequately groomed. Eye contact was good. He speaks at a normative volume and tone. He says his mood is “good.” Affect is congruent, euthymic, full. His thought content is appropriately focused on aftercare planning. Denies SIIP and HIIP. He denies AH and VH. Insight is fair-good. Judgment is improved, although tenuous given chronicity of substance use disorder. Impulse control was appropriate to setting.

## 2022-07-22 NOTE — BH INPATIENT PSYCHIATRY PROGRESS NOTE - NSCGIIMPROVESX_PSY_ALL_CORE
2 = Much improved - notably better with signficant reduction of symptoms; increase in the level of functioning but some symptoms remain
1 - Very much improved - nearly all better; good level of functioning; minimal symptoms; represents a very substantial change
1 - Very much improved - nearly all better; good level of functioning; minimal symptoms; represents a very substantial change

## 2022-07-22 NOTE — BH DISCHARGE NOTE NURSING/SOCIAL WORK/PSYCH REHAB - NSDCPRRECOMMEND_PSY_ALL_CORE
Psychiatric Rehabilitation staff recommends that the patient engage in outpatient services for continued medication and symptom management. Upon discharge, patient has an appointment scheduled with FINN LANDRUM

## 2022-07-22 NOTE — BH INPATIENT PSYCHIATRY PROGRESS NOTE - NSTXPSYCHOGOAL_PSY_ALL_CORE
Will report using relaxation skills 3 times a day to reduce anxiety about delusions/hallucinations

## 2022-07-22 NOTE — BH PSYCHOLOGY - GROUP THERAPY NOTE - NSPSYCHOLGRPGENINT_PSY_A_CORE
cognitive/behavioral therapy/problem solving techniques discussed/stress management/supported coping skills/supportive therapy/treatment compliance encouraged/social skills training

## 2022-07-22 NOTE — BH INPATIENT PSYCHIATRY PROGRESS NOTE - NSTXDCOPLKDATETRGT_PSY_ALL_CORE
21-Jul-2022
28-Jul-2022
21-Jul-2022
21-Jul-2022

## 2022-07-22 NOTE — BH INPATIENT PSYCHIATRY PROGRESS NOTE - NSICDXBHSECONDARYDX_PSY_ALL_CORE
Psychosis, unspecified psychosis type   F29  Cannabis abuse   F12.10  
Cannabis abuse   F12.10  Moderate opioid use disorder   F11.20  Polysubstance abuse   F19.10  
Cannabis abuse   F12.10  Moderate opioid use disorder   F11.20  Polysubstance abuse   F19.10  
Cannabis abuse   F12.10  Psychosis, unspecified psychosis type   F29  Moderate opioid use disorder   F11.20  Polysubstance abuse   F19.10  
Psychosis, unspecified psychosis type   F29  Cannabis abuse   F12.10  
Cannabis abuse   F12.10  Moderate opioid use disorder   F11.20  Polysubstance abuse   F19.10  
Cannabis abuse   F12.10  Moderate opioid use disorder   F11.20  Polysubstance abuse   F19.10

## 2022-07-22 NOTE — BH INPATIENT PSYCHIATRY PROGRESS NOTE - NSTXDISORGGOAL_PSY_ALL_CORE
Will demonstrate related thoughts for 5 min in conversation

## 2022-07-22 NOTE — BH DISCHARGE NOTE NURSING/SOCIAL WORK/PSYCH REHAB - DISCHARGE INSTRUCTIONS AFTERCARE APPOINTMENTS
In order to check the location, date, or time of your aftercare appointment, please refer to your Discharge Instructions Document given to you upon leaving the hospital.  If you have lost the instructions please call [phone]

## 2022-07-22 NOTE — BH PSYCHOLOGY - GROUP THERAPY NOTE - NSBHPSYCHOLPARTICIPCOMMENT_PSY_A_CORE FT
Patient was able to participate and engage with the other group members appropriately.  Patient appeared attentive and contributed to the group discussion. Patient was able to share a personal experience related to the topic that was well-received by the fellow group members.
Patient appeared attentive and interested in the group discussion.  Although the patient did not contribute spontaneously during the group session, patient was able to read from the worksheet when prompted. Patient was able to engage with the  and other members appropriately.
Patient appeared attentive and interested in the group discussion.  Although the patient did not contribute spontaneously during the group session, patient was able to read from the worksheet when prompted. Patient was able to engage with the  and other members appropriately.
Patient was able to participate and engage with the other group members appropriately.  Patient appeared attentive and contributed to the group discussion. Patient was able to share a personal experience related to the topic that was well-received by the fellow group members.

## 2022-07-22 NOTE — BH PSYCHOLOGY - GROUP THERAPY NOTE - NSBHPSYCHOLRESPONSE_PSY_A_CORE
Symptoms reduced/Coping skills acquired/Accepted support
Symptoms reduced/Coping skills acquired/Insight displayed/Accepted support
Symptoms reduced/Coping skills acquired/Accepted support
Symptoms reduced/Coping skills acquired/Accepted support

## 2022-07-22 NOTE — BH PSYCHOLOGY - GROUP THERAPY NOTE - NSPSYCHOLGRPGENPROB_PSY_A_CORE
academic/vocational/social dysfunction/anxiety/depression
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
## 2022-07-22 NOTE — BH PSYCHOLOGY - GROUP THERAPY NOTE - NSPSYCHOLGRPBILLING_PSY_A_CORE
[id number] - Group Psychotherapy
[id number] - Group Psychotherapy
[id number] - Group Psychotherapy
[id number] - Group Psychotherapy

## 2022-07-22 NOTE — BH INPATIENT PSYCHIATRY PROGRESS NOTE - NSTXSUBMISINTERMD_PSY_ALL_CORE
commitment to at least one month of sobriety; engagement with outpatient services; mother holding and distributing medications
commitment to at least one month of sobriety; engagement with outpatient services; mother holding and distributing medications

## 2022-07-22 NOTE — BH INPATIENT PSYCHIATRY PROGRESS NOTE - NSICDXBHPRIMARYDX_PSY_ALL_CORE
Catatonia   F06.1  
Psychosis, unspecified psychosis type   F29  
Benzodiazepine withdrawal   F13.239  
Psychosis, unspecified psychosis type   F29  
Catatonia   F06.1  
Psychosis, unspecified psychosis type   F29  
Psychosis, unspecified psychosis type   F29

## 2022-07-22 NOTE — BH INPATIENT PSYCHIATRY PROGRESS NOTE - NSBHCHARTREVIEWVS_PSY_A_CORE FT
Vital Signs Last 24 Hrs  T(C): 36.4 (07-22-22 @ 08:13), Max: 36.4 (07-21-22 @ 19:08)  T(F): 97.6 (07-22-22 @ 08:13), Max: 97.6 (07-22-22 @ 08:13)  HR: --  BP: --  BP(mean): --  RR: --  SpO2: --    Orthostatic VS  07-22-22 @ 08:13  Lying BP: --/-- HR: --  Sitting BP: 118/78 HR: 90  Standing BP: --/-- HR: --  Site: --  Mode: --
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
## 2022-07-22 NOTE — BH INPATIENT PSYCHIATRY PROGRESS NOTE - NSTXDISORGDATEEST_PSY_ALL_CORE
14-Jul-2022

## 2022-07-22 NOTE — BH INPATIENT PSYCHIATRY PROGRESS NOTE - NSTXDCOPLKINTERMD_PSY_ALL_CORE
seeking outpatient substance rehabilitation services
seeking outpatient substance rehabilitation services

## 2022-07-22 NOTE — BH INPATIENT PSYCHIATRY PROGRESS NOTE - NSTXDCOPLKDATEEST_PSY_ALL_CORE
14-Jul-2022
21-Jul-2022
14-Jul-2022
14-Jul-2022

## 2022-07-22 NOTE — BH DISCHARGE NOTE NURSING/SOCIAL WORK/PSYCH REHAB - NSBHDCAGENCY1FT_PSY_A_CORE
FINN LANDRUM	  This appointment is an "in person appointment". Please arrive 10 to 15 minutes prior to your appointment time. Please bring Photo ID and Insurance card.

## 2022-07-22 NOTE — BH PSYCHOLOGY - GROUP THERAPY NOTE - NSPSYCHOLGRPGENPT_PSY_A_CORE FT
Patient attended the cognitive behavior therapy group session. Group focused on topics including understanding the connection between cognitive processes and learning to tolerate moments of distress.  Coping methods such as creating a list of pros and cons as well as the techniques of distraction and self-soothing were discussed.  Group expectations and guidelines as well as confidentiality and its limitations were addressed. Principles of cognitive behavior therapy related to today's group topic were reviewed.  Group members illustrated understanding of these concepts by giving personal examples based on their current experiences. Discussions stemmed from the group topics to the causal connection between thoughts, feelings, and behaviors.
Patient attended the cognitive behavior therapy group session. Group session focused on the topic of goal-setting.  Discussion revolved around the guidelines for creating structured goals for oneself utilizing the acronym SMART (Specific, Measurable, Achievable, Realistic, and Time-Limited).  Group expectations and guidelines, as well as confidentiality and its limitations, were addressed. Principles of cognitive behavior therapy related to today's group topic were reviewed and discussed. Group members illustrated understanding of these concepts by giving personal examples based on their current experiences. Discussions stemmed from the group topics to the causal connection between thoughts, feelings, and behaviors.
Patient attended the cognitive behavior therapy group session. Group session focused on the stages of change.  Discussion revolved around learning how to navigate the stages of Precontemplation, Contemplation, Preparation, Action, and Maintenance. Techniques focusing on learning about the different aspects of each stage.  Strategies for change as well as reviewing the possibilities of relapse were also discussed.  Group expectations and guidelines, as well as confidentiality and its limitations, were addressed. Principles of cognitive behavior therapy related to today's group topic were reviewed and discussed. Group members illustrated understanding of these concepts by giving personal examples based on their current experiences. Discussions stemmed from the group topics to the causal connection between thoughts, feelings, and behaviors.
Patient attended the cognitive behavior therapy group session. Group focused on topics including identifying stress, understanding the physical reactions to stress, and learning adaptive coping methods in managing stress. Group expectations and guidelines, as well as confidentiality and its limitations, were addressed. Principles of cognitive behavior therapy related to today's group topic were reviewed and discussed. Group members illustrated understanding of these concepts by giving personal examples based on their current experiences. Discussions stemmed from the group topics to the causal connection between thoughts, feelings, and behaviors.

## 2022-07-22 NOTE — BH INPATIENT PSYCHIATRY PROGRESS NOTE - NSBHATTESTTYPEVISIT_PSY_A_CORE
Attending Only
MARGARET without on-site Attending supervision
Attending with Resident/Fellow/Student
MARGARET without on-site Attending supervision
Attending with Resident/Fellow/Student

## 2022-07-22 NOTE — BH INPATIENT PSYCHIATRY PROGRESS NOTE - NSBHASSESSSUMMFT_PSY_ALL_CORE
The patient is a 32-year-old man with a history of polysubstance use disorder (Percocet, Xanax, marijuana) presenting with symptoms of psychosis and lito in the context of substance use (THC, Percocet, possibly Suboxone, possibly Xanax) and lack of outpatient treatment.     Patient continues to exhibit clear thoughts and behaviors. At this time, he is not displaying any overt symptoms of EtOH/benzo or opioid withdrawal, although the chlordiazepoxide may be masking these symptoms. His vitals are noted to be stable. Presentation last week likely due to benzo withdrawal vs. opioid withdrawal delirium vs. substance-induced psychosis. Leading diagnoses are that of benzo and opioid withdrawal delirium given resolution of symptoms on benzodiazepines and improvement on clonidine.     Counseling provided regarding safety with regards to driving and drinking while on chlordiazepoxide. Patient expressed understanding and willingness to contact outpatient provider, call 911, or present to the ED if returning or worsening symptoms. His mother has agreed to hold and dispense all his prescribed medications.    Plan:  - Discharge today  - continue chlordiazepoxide 30mg BID  - continue mirtazapine 15mg qhs  - continue trazadone 50mg PRN at bedtime  - mother holding and dispensing medications  - [facility] intake on Wednesday 7/27/2022   The patient is a 32-year-old man with a history of polysubstance use disorder (Percocet, Xanax, marijuana) presenting with symptoms of psychosis and lito in the context of substance use (THC, Percocet, possibly Suboxone, possibly Xanax) and lack of outpatient treatment.     Patient continues to exhibit clear thoughts and behaviors. At this time, he is not displaying any overt symptoms of EtOH/benzo or opioid withdrawal, although the chlordiazepoxide may be masking these symptoms. His vitals are noted to be stable. Presentation last week likely due to benzo withdrawal vs. opioid withdrawal delirium vs. substance-induced psychosis. Leading diagnoses are that of benzo and opioid withdrawal delirium given resolution of symptoms on benzodiazepines and improvement on clonidine.     Counseling provided regarding safety with regards to driving and drinking while on chlordiazepoxide. Patient expressed understanding and willingness to contact outpatient provider, call 911, or present to the ED if returning or worsening symptoms. His mother has agreed to hold and dispense all his prescribed medications.    Plan:  - Discharge today  - continue chlordiazepoxide 30mg BID  - continue mirtazapine 15mg qhs  - continue Trazadone 50mg PRN at bedtime  - mother holding and dispensing medications  - [facility] intake on Wednesday 7/27/2022

## 2022-07-22 NOTE — BH INPATIENT PSYCHIATRY DISCHARGE NOTE - NSBHDCHANDOFFFT_PSY_ALL_CORE
Discussed presentation, hospital course, aftercare plan with Dr. Inman at [facility].

## 2022-07-22 NOTE — BH INPATIENT PSYCHIATRY DISCHARGE NOTE - NSBHDCRISKMITIGATEFT_PSY_ALL_CORE
Patient remains at high chronic risk of self-deleterious behaviors given substance use disorder. This is mitigated by willingness to engage with treatment and strong family supports. His acute risk is low given lack of history of suicidality or suicide attempts, desire to keep living, goal-oriented behavior, and desire to avoid representation to hospital. He has consistently denied SIIP and HIIP, does not have access to a gun, has expressed a desire to communicate with his family during times of stability and crisis, and is highly motivated to engage in outpatient treatment. He has been compliant with meds and willing for his mother to hold and distribute his medications. Patient and family are agreeable to contact outpatient providers, call 911, or go to the nearest ED with any imminent safety concerns for self or others. Given the above, patient does not appear to constitute imminent threat to self or others and is appropriate for discharge.   The patient is at slightly elevated chronic risk for suicide/violence, but low acute risk for suicide/self harm/violence, risk related to substance use is considered to represent the highest chronic risk to patients safety. Static risk factors include; psych dx, hx of hospitalization, hx of substance, hx of arrest. Patient has no hx of SA or notable violence (other than within context of delirium). Acute risk factors present on admission but mitigated during hospitalization include; delirium, agitation, disorganization, paranoia, feeling under threat, acute withdrawal from opiates and presumed bzds, anxiety, sleep disturbance, and psychosis. Acute risk factors were mitigated during admission via medication tx, I/G/M therapy, safety planning, motivational interviewing, and linkage with outpatient substance tx, and psychoeducation. 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 Given protective factors, and that acute risk factors present on admission have been addressed and are no longer present at dc, patient has returned to baseline level of acute risk and the patient no longer requires inpatient hospitalization for stabilization or safety. The patient is therefore appropriate for dc to outpatient care. Chronic risk can be further mitigated by continued engagement with outpatient tx, substance, consideration for buprenorphine or other MAT and gradual reduction in bzd dose and eventual discontinuation.  At the time of dc the patient engaged meaningfully in safety planning and was dc home to outpatient tx.

## 2022-07-22 NOTE — BH INPATIENT PSYCHIATRY PROGRESS NOTE - NSTXDISORGDATETRGT_PSY_ALL_CORE
21-Jul-2022
27-Jul-2022
21-Jul-2022

## 2022-07-22 NOTE — BH INPATIENT PSYCHIATRY DISCHARGE NOTE - NSDCCPCAREPLAN_GEN_ALL_CORE_FT
PRINCIPAL DISCHARGE DIAGNOSIS  Diagnosis: Benzodiazepine withdrawal  Assessment and Plan of Treatment:

## 2022-07-22 NOTE — BH DISCHARGE NOTE NURSING/SOCIAL WORK/PSYCH REHAB - PATIENT PORTAL LINK FT
You can access the FollowMyHealth Patient Portal offered by [facility] by registering at the following website: [email or web address]. By joining [employer]’s FollowMyHealth portal, you will also be able to view your health information using other applications (apps) compatible with our system.

## 2022-07-22 NOTE — BH PSYCHOLOGY - GROUP THERAPY NOTE - TOKEN PULL-DIAGNOSIS
Primary Diagnosis:  Catatonia [F06.1]      Psychosis, unspecified psychosis type [F29]        Problem Dx:   Cannabis abuse [F12.10]      Psychosis, unspecified psychosis type [F29]      
Primary Diagnosis:  Psychosis, unspecified psychosis type [F29]      Catatonia [F06.1]      Psychosis, unspecified psychosis type [F29]        Problem Dx:   Polysubstance abuse [F19.10]      Moderate opioid use disorder [F11.20]      Cannabis abuse [F12.10]      Psychosis, unspecified psychosis type [F29]      
Primary Diagnosis:  Psychosis, unspecified psychosis type [F29]      Catatonia [F06.1]      Psychosis, unspecified psychosis type [F29]        Problem Dx:   Polysubstance abuse [F19.10]      Moderate opioid use disorder [F11.20]      Cannabis abuse [F12.10]      Psychosis, unspecified psychosis type [F29]      
Primary Diagnosis:  Benzodiazepine withdrawal [F13.239]      Psychosis, unspecified psychosis type [F29]      Catatonia [F06.1]      Psychosis, unspecified psychosis type [F29]        Problem Dx:   Polysubstance abuse [F19.10]      Moderate opioid use disorder [F11.20]      Cannabis abuse [F12.10]      Psychosis, unspecified psychosis type [F29]
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
## 2022-07-24 ENCOUNTER — INPATIENT (INPATIENT)
Facility: HOSPITAL | Age: 32
LOS: 10 days | Discharge: NURSING, REHAB OR LONG-TERM CARE | End: 2022-08-04
Attending: INTERNAL MEDICINE | Admitting: INTERNAL MEDICINE

## 2022-07-24 VITALS
HEART RATE: 118 BPM | TEMPERATURE: 100 F | HEIGHT: 76 IN | RESPIRATION RATE: 12 BRPM | OXYGEN SATURATION: 98 % | SYSTOLIC BLOOD PRESSURE: 128 MMHG | DIASTOLIC BLOOD PRESSURE: 95 MMHG

## 2022-07-24 DIAGNOSIS — F19.20 OTHER PSYCHOACTIVE SUBSTANCE DEPENDENCE, UNCOMPLICATED: ICD-10-CM

## 2022-07-24 DIAGNOSIS — R55 SYNCOPE AND COLLAPSE: ICD-10-CM

## 2022-07-24 DIAGNOSIS — Z29.9 ENCOUNTER FOR PROPHYLACTIC MEASURES, UNSPECIFIED: ICD-10-CM

## 2022-07-24 DIAGNOSIS — R10.9 UNSPECIFIED ABDOMINAL PAIN: ICD-10-CM

## 2022-07-24 DIAGNOSIS — R65.10 SYSTEMIC INFLAMMATORY RESPONSE SYNDROME (SIRS) OF NON-INFECTIOUS ORIGIN WITHOUT ACUTE ORGAN DYSFUNCTION: ICD-10-CM

## 2022-07-24 DIAGNOSIS — R06.81 APNEA, NOT ELSEWHERE CLASSIFIED: ICD-10-CM

## 2022-07-24 DIAGNOSIS — G47.00 INSOMNIA, UNSPECIFIED: ICD-10-CM

## 2022-07-24 PROBLEM — F11.10 OPIOID ABUSE, UNCOMPLICATED: Chronic | Status: ACTIVE | Noted: 2022-07-12

## 2022-07-24 LAB
ALBUMIN SERPL ELPH-MCNC: 5.1 G/DL — HIGH (ref 3.3–5)
ALP SERPL-CCNC: 69 U/L — SIGNIFICANT CHANGE UP (ref 40–120)
ALT FLD-CCNC: 29 U/L — SIGNIFICANT CHANGE UP (ref 4–41)
AMPHET UR-MCNC: NEGATIVE — SIGNIFICANT CHANGE UP
ANION GAP SERPL CALC-SCNC: 9 MMOL/L — SIGNIFICANT CHANGE UP (ref 7–14)
APAP SERPL-MCNC: <10 UG/ML — LOW (ref 15–25)
AST SERPL-CCNC: 29 U/L — SIGNIFICANT CHANGE UP (ref 4–40)
BARBITURATES UR SCN-MCNC: NEGATIVE — SIGNIFICANT CHANGE UP
BASOPHILS # BLD AUTO: 0.05 K/UL — SIGNIFICANT CHANGE UP (ref 0–0.2)
BASOPHILS NFR BLD AUTO: 0.4 % — SIGNIFICANT CHANGE UP (ref 0–2)
BENZODIAZ UR-MCNC: POSITIVE
BILIRUB SERPL-MCNC: 0.2 MG/DL — SIGNIFICANT CHANGE UP (ref 0.2–1.2)
BUN SERPL-MCNC: 12 MG/DL — SIGNIFICANT CHANGE UP (ref 7–23)
CALCIUM SERPL-MCNC: 9.6 MG/DL — SIGNIFICANT CHANGE UP (ref 8.4–10.5)
CHLORIDE SERPL-SCNC: 100 MMOL/L — SIGNIFICANT CHANGE UP (ref 98–107)
CK MB BLD-MCNC: 2 % — SIGNIFICANT CHANGE UP (ref 0–2.5)
CK MB CFR SERPL CALC: 1.2 NG/ML — SIGNIFICANT CHANGE UP
CK SERPL-CCNC: 60 U/L — SIGNIFICANT CHANGE UP (ref 30–200)
CO2 SERPL-SCNC: 28 MMOL/L — SIGNIFICANT CHANGE UP (ref 22–31)
COCAINE METAB.OTHER UR-MCNC: NEGATIVE — SIGNIFICANT CHANGE UP
CREAT SERPL-MCNC: 1.01 MG/DL — SIGNIFICANT CHANGE UP (ref 0.5–1.3)
CREATININE URINE RESULT, DAU: 52 MG/DL — SIGNIFICANT CHANGE UP
EGFR: 101 ML/MIN/1.73M2 — SIGNIFICANT CHANGE UP
EOSINOPHIL # BLD AUTO: 0.05 K/UL — SIGNIFICANT CHANGE UP (ref 0–0.5)
EOSINOPHIL NFR BLD AUTO: 0.4 % — SIGNIFICANT CHANGE UP (ref 0–6)
ETHANOL SERPL-MCNC: <10 MG/DL — SIGNIFICANT CHANGE UP
GLUCOSE SERPL-MCNC: 102 MG/DL — HIGH (ref 70–99)
HCT VFR BLD CALC: 41.8 % — SIGNIFICANT CHANGE UP (ref 39–50)
HGB BLD-MCNC: 14 G/DL — SIGNIFICANT CHANGE UP (ref 13–17)
IANC: 9.54 K/UL — HIGH (ref 1.8–7.4)
IMM GRANULOCYTES NFR BLD AUTO: 0.4 % — SIGNIFICANT CHANGE UP (ref 0–1.5)
LYMPHOCYTES # BLD AUTO: 1.17 K/UL — SIGNIFICANT CHANGE UP (ref 1–3.3)
LYMPHOCYTES # BLD AUTO: 10 % — LOW (ref 13–44)
MCHC RBC-ENTMCNC: 31.2 PG — SIGNIFICANT CHANGE UP (ref 27–34)
MCHC RBC-ENTMCNC: 33.5 GM/DL — SIGNIFICANT CHANGE UP (ref 32–36)
MCV RBC AUTO: 93.1 FL — SIGNIFICANT CHANGE UP (ref 80–100)
METHADONE UR-MCNC: NEGATIVE — SIGNIFICANT CHANGE UP
MONOCYTES # BLD AUTO: 0.81 K/UL — SIGNIFICANT CHANGE UP (ref 0–0.9)
MONOCYTES NFR BLD AUTO: 6.9 % — SIGNIFICANT CHANGE UP (ref 2–14)
NEUTROPHILS # BLD AUTO: 9.54 K/UL — HIGH (ref 1.8–7.4)
NEUTROPHILS NFR BLD AUTO: 81.9 % — HIGH (ref 43–77)
NRBC # BLD: 0 /100 WBCS — SIGNIFICANT CHANGE UP
NRBC # FLD: 0 K/UL — SIGNIFICANT CHANGE UP
OPIATES UR-MCNC: POSITIVE
OXYCODONE UR-MCNC: NEGATIVE — SIGNIFICANT CHANGE UP
PCP SPEC-MCNC: SIGNIFICANT CHANGE UP
PCP UR-MCNC: NEGATIVE — SIGNIFICANT CHANGE UP
PLATELET # BLD AUTO: 299 K/UL — SIGNIFICANT CHANGE UP (ref 150–400)
POTASSIUM SERPL-MCNC: 4.8 MMOL/L — SIGNIFICANT CHANGE UP (ref 3.5–5.3)
POTASSIUM SERPL-SCNC: 4.8 MMOL/L — SIGNIFICANT CHANGE UP (ref 3.5–5.3)
PROT SERPL-MCNC: 7.6 G/DL — SIGNIFICANT CHANGE UP (ref 6–8.3)
RBC # BLD: 4.49 M/UL — SIGNIFICANT CHANGE UP (ref 4.2–5.8)
RBC # FLD: 12.8 % — SIGNIFICANT CHANGE UP (ref 10.3–14.5)
SALICYLATES SERPL-MCNC: <0.3 MG/DL — LOW (ref 15–30)
SARS-COV-2 RNA SPEC QL NAA+PROBE: SIGNIFICANT CHANGE UP
SODIUM SERPL-SCNC: 137 MMOL/L — SIGNIFICANT CHANGE UP (ref 135–145)
THC UR QL: NEGATIVE — SIGNIFICANT CHANGE UP
TROPONIN T, HIGH SENSITIVITY RESULT: 41 NG/L — SIGNIFICANT CHANGE UP
WBC # BLD: 11.67 K/UL — HIGH (ref 3.8–10.5)
WBC # FLD AUTO: 11.67 K/UL — HIGH (ref 3.8–10.5)

## 2022-07-24 PROCEDURE — 71045 X-RAY EXAM CHEST 1 VIEW: CPT | Mod: 26

## 2022-07-24 PROCEDURE — 99284 EMERGENCY DEPT VISIT MOD MDM: CPT

## 2022-07-24 PROCEDURE — 99223 1ST HOSP IP/OBS HIGH 75: CPT

## 2022-07-24 PROCEDURE — 74177 CT ABD & PELVIS W/CONTRAST: CPT | Mod: 26

## 2022-07-24 RX ORDER — TRAZODONE HCL 50 MG
50 TABLET ORAL AT BEDTIME
Refills: 0 | Status: DISCONTINUED | OUTPATIENT
Start: 2022-07-24 | End: 2022-07-25

## 2022-07-24 RX ORDER — ACETAMINOPHEN 500 MG
650 TABLET ORAL ONCE
Refills: 0 | Status: COMPLETED | OUTPATIENT
Start: 2022-07-24 | End: 2022-07-24

## 2022-07-24 RX ORDER — SODIUM CHLORIDE 9 MG/ML
1000 INJECTION, SOLUTION INTRAVENOUS
Refills: 0 | Status: DISCONTINUED | OUTPATIENT
Start: 2022-07-24 | End: 2022-07-25

## 2022-07-24 RX ORDER — MIRTAZAPINE 45 MG/1
15 TABLET, ORALLY DISINTEGRATING ORAL AT BEDTIME
Refills: 0 | Status: DISCONTINUED | OUTPATIENT
Start: 2022-07-24 | End: 2022-07-29

## 2022-07-24 RX ORDER — SODIUM CHLORIDE 9 MG/ML
1000 INJECTION, SOLUTION INTRAVENOUS ONCE
Refills: 0 | Status: COMPLETED | OUTPATIENT
Start: 2022-07-24 | End: 2022-07-24

## 2022-07-24 RX ADMIN — SODIUM CHLORIDE 1000 MILLILITER(S): 9 INJECTION, SOLUTION INTRAVENOUS at 16:28

## 2022-07-24 RX ADMIN — Medication 650 MILLIGRAM(S): at 16:19

## 2022-07-24 RX ADMIN — SODIUM CHLORIDE 1000 MILLILITER(S): 9 INJECTION, SOLUTION INTRAVENOUS at 15:28

## 2022-07-24 RX ADMIN — SODIUM CHLORIDE 75 MILLILITER(S): 9 INJECTION, SOLUTION INTRAVENOUS at 18:19

## 2022-07-24 RX ADMIN — Medication 650 MILLIGRAM(S): at 17:12

## 2022-07-24 NOTE — H&P ADULT - NSHPLABSRESULTS_GEN_ALL_CORE
14.0   11.67 )-----------( 299      ( 24 Jul 2022 15:00 )             41.8       07-24    137  |  100  |  12  ----------------------------<  102<H>  4.8   |  28  |  1.01    Ca    9.6      24 Jul 2022 15:00    TPro  7.6  /  Alb  5.1<H>  /  TBili  0.2  /  DBili  x   /  AST  29  /  ALT  29  /  AlkPhos  69  07-24

## 2022-07-24 NOTE — H&P ADULT - PROBLEM SELECTOR PLAN 2
SIRS positive (temp 100.3, tachycardic, leukocytosis)  - likely reactive  - no focal s/s of infection  - panculture, and observe off abx  - trend fever curve

## 2022-07-24 NOTE — H&P ADULT - ASSESSMENT
32M with hx of 32M with hx of polysubstance abuse (Percocet, Xanax, Marijuana), recent discharge from [facility] after stay for psychosis and lito in the context of substance abuse, who presents after ?syncope vs. arrest. , who presents after ?syncope vs. arrest. s/p CPR by bystanders, and narcan with EMS.  32M with hx of 32M with hx of polysubstance abuse (Percocet, Xanax, Marijuana), recent discharge from [facility] after stay for psychosis and lito in the context of substance abuse, who presents after ?syncope vs. arrest, who presents after ?syncope vs. arrest. s/p CPR by bystanders, and narcan with EMS.

## 2022-07-24 NOTE — ED PROVIDER NOTE - OBJECTIVE STATEMENT
32 year old man, domiciled with mother in a private residence, hx of substance use (reports suboxone use, Percocet) who presents via EMS for bizarre behavior. Mom states: "He was acting odd at [place] and felt dizzy and passed out." As per EMS patient was having agonal breaths, patient was lowered to the ground and bystanders were doing CPR Narcan 0.4 given. Patient denies any drug use, patient d/c ed from [facility] 2 days ago. Per mother she gave him 30mg librium this morning, no other meds. States she went to [place] without him and does not know if he may have taken any other meds in that time.

## 2022-07-24 NOTE — ED PROVIDER NOTE - NS ED ROS FT
Gen: No F/C/NS  Head: no fall or head trauma   Eyes: No changes in vision   Resp: No cough or trouble breathing  Cardiovascular: No chest pain or palpitation  Gastroenteric: No N/V/D  :  No change in urine output, dysuria or hematuria   MS: No joint or muscle pain  Neuro: +headache; +abnormal movements

## 2022-07-24 NOTE — H&P ADULT - NSHPPHYSICALEXAM_GEN_ALL_CORE
Vital Signs Last 24 Hrs  T(C): 36.7 (24 Jul 2022 17:12), Max: 37.9 (24 Jul 2022 13:48)  T(F): 98.1 (24 Jul 2022 17:12), Max: 100.3 (24 Jul 2022 13:48)  HR: 89 (24 Jul 2022 17:12) (89 - 118)  BP: 133/84 (24 Jul 2022 17:12) (128/95 - 133/84)  BP(mean): --  RR: 18 (24 Jul 2022 17:12) (12 - 18)  SpO2: 100% (24 Jul 2022 17:12) (98% - 100%)    Parameters below as of 24 Jul 2022 17:12  Patient On (Oxygen Delivery Method): nasal cannula  O2 Flow (L/min): 3      CONSTITUTIONAL: well-developed, well-groomed, no apparent distress  EYES: no conjunctival or scleral injection, non-icteric, PERRLA  ENMT: no external nasal lesions, oral mucosa with moist membranes  NECK: trachea midline, no palpable neck mass   RESPIRATORY: breathing comfortably, lungs CTA without wheeze/rales/rhonchi  CARDIOVASCULAR: regular rate and rhythm; +S1S2, no murmurs, rubs, or gallops, no lower extremity edema, 2+ peripheral pulses  GASTROINTESTINAL: soft, nontender, nondistended; +BS throughout, no rebound/guarding  MUSCULOSKELETAL: no joint effusions, normal strength and tone of extremities   NEUROLOGIC: non-focal, sensation intact to light touch in b/l upper and lower extremities   PSYCHIATRIC: AAOx3, anxious  SKIN: multiple excoriations over trunk from itching

## 2022-07-24 NOTE — ED PROVIDER NOTE - IV ALTEPLASE EXCL REL HIDDEN
COVID-19 Outpatient Progress Note    Assessment/Plan:    Problem List Items Addressed This Visit        Other    COVID-19 virus infection - Primary     I advised patient that they should isolate for 5 days from onset of symptoms and stay in their home and have contact with as few people as possible  They should stay home unless medically necessary  They  should not attend school/work with other people or public gatherings  A medical letter will be provided to patient for time off  Employers have been advised to be considerate if an employee needs to be quarantined  Patient was recommended not to attend gatherings, meetings or areas where people come together  They also should not have any visitors while on quarantine to avoid spread if suspicion of COVID  Patient should monitor their symptoms and take their temperature everyday and report any fever or new symptoms to PCP  Patient can take OTC medications as patient has like Tylenol and practice conservative measures while at home in quarantine  Stressed importance of good hand hygiene practices and avoid sharing any personal items with other household members  Practicing good disinfection as well  Pt in agreement  Patient is Day 1 from symptom onset 5/18/22  Pt symptoms are not changed  Admits to fevers today  Offered oral anti-virals to patient at this time she refused  Offered MAB IV infusion as well and patient does not wish to have it  States if she worsens she will notify us  She is aware of the 5 day timeframe  Pt was advised that as long as symptoms are improving per CDC guidelines she can return to work on 5/24/22, as it has been 5 days from symptom onset and as long as no fevers in 24 hours  Pt in agreement with plan  If any changes in symptoms or new symptoms occur to inform office  Shortness of breath     -reports chest tightness and shortness of breath at times  I am prescribing an inhaler to use as needed   If s/s do not improve or worsen to go to [facility] hospital  Pt verbalized understanding  Relevant Medications    albuterol (PROVENTIL HFA,VENTOLIN HFA) 90 mcg/act inhaler    Cough    Relevant Medications    benzonatate (TESSALON) 200 MG capsule         Disposition:     Patient has COVID-19 infection  Based off CDC guidelines, they were recommended to isolate for 5 days from the date of the positive test  If they remain asymptomatic, isolation may be ended followed by 5 days of wearing a mask when around othes to minimize risk of infecting others  If they have a fever, continue to stay home until fever resolves for at least 24 hours  Discussed symptom directed medication options with patient  Discussed vitamin D, vitamin C, and/or zinc supplementation with patient  I have spent 15 minutes directly with the patient  Encounter provider NUNU Cowart    Provider located at [address] [address]  [address] [place] [address] [phone]    Recent Visits  No visits were found meeting these conditions  Showing recent visits within past 7 days and meeting all other requirements  Today's Visits  Date Type Provider Dept   05/19/22 Telemedicine Uri Woods, [address] today's visits and meeting all other requirements  Future Appointments  No visits were found meeting these conditions  Showing future appointments within next 150 days and meeting all other requirements       Patient agrees to participate in a virtual check in via telephone or video visit instead of presenting to the office to address urgent/immediate medical needs  Patient is aware this is a billable service  After connecting through [facility], the patient was identified by name and date of birth  Rob Mccoy was informed that this was a telemedicine visit and that the exam was being conducted confidentially over secure lines   My office door was closed  No one else was in the room  Oxana Patton acknowledged consent and understanding of privacy and security of the telemedicine visit  I informed the patient that I have reviewed her record in Epic and presented the opportunity for her to ask any questions regarding the visit today  The patient agreed to participate  Verification of patient location:  Patient is located in the following state in which I hold an active license: [place]    Subjective:   Oxana Patton is a 60 y o  female who has been screened for COVID-19  Symptom change since last report: unchanged  Patient's symptoms include fever, nasal congestion, cough, shortness of breath, myalgias and headache  Patient denies sore throat, anosmia, loss of taste, chest tightness, abdominal pain, nausea, vomiting and diarrhea  - Date of symptom onset: 5/18/2022  - Date of positive COVID-19 test: 5/18/2022  Type of test: PCR  COVID-19 vaccination status: Fully vaccinated (primary series)    Elina Franco has been staying home and has isolated themselves in her home  She is taking care to not share personal items and is cleaning all surfaces that are touched often, like counters, tabletops, and doorknobs using household cleaning sprays or wipes  She is wearing a mask when she leaves her room  Pt states she was exposed on Sunday with someone who tested positive for COVID  Is drinking Ibuprofen and Gatorade  Is not taking anything for her cough  Feels SOB and congested       No results found for: Qian Zavala, [address] [facility], [address] [facility], [facility], [address] [facility], [facility]  Past Medical History:   Diagnosis Date    Asthma     Disease of thyroid gland     Gastritis     GERD (gastroesophageal reflux disease)     H  pylori infection 09/2017    High cholesterol     Osteopenia     Thyroid disease      Past Surgical History:   Procedure Laterality Date    AUGMENTATION MAMMAPLASTY Bilateral 7050    silicone    BREAST BIOPSY Left 2015    normal    BREAST CYST EXCISION Bilateral     CHOLECYSTECTOMY      COLONOSCOPY W/ POLYPECTOMY  09/2017    ESOPHAGOGASTRODUODENOSCOPY  09/14/2017    non erosive gastritis, positive h pylori    HYSTERECTOMY  1991    OOPHORECTOMY  1991     Current Outpatient Medications   Medication Sig Dispense Refill    albuterol (PROVENTIL HFA,VENTOLIN HFA) 90 mcg/act inhaler Inhale 2 puffs every 6 (six) hours as needed for shortness of breath 18 g 0    benzonatate (TESSALON) 200 MG capsule Take 1 capsule (200 mg total) by mouth as needed in the morning and 1 capsule (200 mg total) as needed at noon and 1 capsule (200 mg total) as needed in the evening for cough  20 capsule 0    Ergocalciferol (VITAMIN D2 PO) Take 50,000 Units by mouth once a week      levothyroxine 137 mcg tablet Take 1 tablet (137 mcg total) by mouth daily in the early morning 90 tablet 4    pantoprazole (PROTONIX) 40 mg tablet Take 1 tablet (40 mg total) by mouth every morning 30 minutes before breakfast  90 tablet 4    rosuvastatin (CRESTOR) 5 mg tablet Take 1 tablet (5 mg total) by mouth daily 90 tablet 3    venlafaxine (EFFEXOR-XR) 75 mg 24 hr capsule Take 1 capsule (75 mg total) by mouth daily 90 capsule 3     No current facility-administered medications for this visit  Allergies   Allergen Reactions    Pollen Extract Cough       Review of Systems   Constitutional: Positive for fever  HENT: Positive for congestion  Negative for sore throat  Respiratory: Positive for cough and shortness of breath  Negative for chest tightness  Gastrointestinal: Negative for abdominal pain, diarrhea, nausea and vomiting  Musculoskeletal: Positive for myalgias  Neurological: Positive for headaches  Objective: There were no vitals filed for this visit  Physical Exam  Vitals reviewed  Constitutional:       General: She is not in acute distress  Appearance: Normal appearance  She is well-developed  She is not diaphoretic     HENT:      Head: Normocephalic and atraumatic  Right Ear: External ear normal       Left Ear: External ear normal       Nose: Congestion present  Mouth/Throat:      Pharynx: Oropharynx is clear  Eyes:      Conjunctiva/sclera: Conjunctivae normal    Pulmonary:      Effort: Pulmonary effort is normal  No respiratory distress  Comments: No distress noted  Musculoskeletal:         General: No tenderness  Normal range of motion  Cervical back: Normal range of motion  Neurological:      Mental Status: She is alert and oriented to person, place, and time  Psychiatric:         Mood and Affect: Mood normal          Behavior: Behavior normal          VIRTUAL VISIT DISCLAIMER    Karen Rodriguez verbally agrees to participate in [employer]  Pt is aware that [employer] could be limited without vital signs or the ability to perform a full hands-on physical Jenni Cos understands she or the provider may request at any time to terminate the video visit and request the patient to seek care or treatment in person  show

## 2022-07-24 NOTE — H&P ADULT - PROBLEM SELECTOR PLAN 4
Patient has not been able to sleep for the past two night  - c/w home Trazadone 50mg qhs + Mirtazapine 15mg daily   - psychiatry consulted, follow up recommendations hx of polysubstance use (Percocet, Xanax, Marijuana), recent discharge from [facility] after stay for psychosis and lito in the context of substance abuse  - c/w home librium 30mg BID - as prescribed by [facility], d/w psych ok to continue  - serum tox negative, check utox   - denies SI/HI - no indication for 1:1 at this time  - psychiatry consulted, follow up recommendations

## 2022-07-24 NOTE — ED ADULT NURSE REASSESSMENT NOTE - NS ED NURSE REASSESS COMMENT FT1
Pt straining to void and noted pressure around bladder. Pt had only 5 ml urine produced since arrival after IVF and several pitchers of PO intake. RN noted bright red blood with clots in urine. ACP made aware. Waiting for further instructions. SUJEY Liz
care handoff report given to SUJEY DUPONT .PT is still in the ER waiting for bed to be cleaned
pt was received in room 3  Awake ad alert. pt is aaox4,  100% on 2L of 02, pt is able to speak In full and complete sentences. pt  denies SOB, Chest pain, dizziness. palpitation or nausea vomiting at this time. pt is currently attached to the cardiac  monitor and pulse oximetry for continuous monitoring.
Pt resting in stretcher in no apparent distress. denies discomfort. Respirations even, non-labored. Skin warm, dry, color appropriate. Pt encouraged to provide urine specimen. Mother at bedside. call bell within reach. Education provided to pt on how to and when to use call bell for assistance. Will continue to monitor. SUJEY Liz

## 2022-07-24 NOTE — ED PROVIDER NOTE - CLINICAL SUMMARY MEDICAL DECISION MAKING FREE TEXT BOX
32 year old man, domiciled with mother in a private residence, hx of substance use (reports suboxone use, Percocet) who presents via EMS for bizarre behavior. Plan to keep patient on end tidal, obtain CXR given recent CPR, labs, urine, tox, TBA, psych. Kylie att: 32 year old man, domiciled with mother in a private residence, hx of substance use (reports suboxone use, Percocet) who presents via EMS for bizarre behavior. Plan to keep patient on end tidal, obtain CXR given recent CPR, labs, urine, tox, TBA, psych.

## 2022-07-24 NOTE — ED ADULT NURSE NOTE - NSIMPLEMENTINTERV_GEN_ALL_ED
Implemented All Fall Risk Interventions:  [place] to call system. Call bell, personal items and telephone within reach. Instruct patient to call for assistance. Room bathroom lighting operational. Non-slip footwear when patient is off stretcher. Physically safe environment: no spills, clutter or unnecessary equipment. Stretcher in lowest position, wheels locked, appropriate side rails in place. Provide visual cue, wrist band, yellow gown, etc. Monitor gait and stability. Monitor for mental status changes and reorient to person, place, and time. Review medications for side effects contributing to fall risk. Reinforce activity limits and safety measures with patient and family.

## 2022-07-24 NOTE — ED PROVIDER NOTE - PHYSICAL EXAMINATION
G: NAD, cooperative with exam   H: NCAT  E: EOMI   M: Mucous membranes moist   R: CTABL, nWOB  C: RRR  A: Soft, NT/ND, no rebound/guarding   MSK: moving all ext spontaneously

## 2022-07-24 NOTE — ED ADULT NURSE NOTE - CHIEF COMPLAINT QUOTE
mom states " He was acting odd at [facility] place and felt dizzy and passed out". as per EMS patient was having agonal breaths and bystanders were doing CPR Narcan0.4 IN given by PD patient is awake , c/o head ache. 18 Angiocath left arm by ems patient denies any drug use, patient d/c ed from [facility]  last week.

## 2022-07-24 NOTE — H&P ADULT - PROBLEM SELECTOR PLAN 3
hx of polysubstance use (Percocet, Xanax, Marijuana), recent discharge from [facility] after stay for psychosis and lito in the context of substance abuse  - c/w home librium 30mg BID - as prescribed by [facility], d/w psych ok to continue  - serum tox negative, check utox   - denies SI/HI - no indication for 1:1 at this time  - psychiatry consulted, follow up recommendations LLQ abdominal pain   - denies urinary sx  - check UA, CTAP

## 2022-07-24 NOTE — H&P ADULT - HISTORY OF PRESENT ILLNESS
32M with hx of polysubstance use (Percocet, Xanax, Marijuana), recent discharge from [facility] after stay for psychosis and lito in the context of substance abuse, who presents after ?syncope vs. arrest. Patient was recently discharged from [facility] two days ago. Since discharge he has had difficulty sleeping, last night took Trazadone 50mg then an additional 50mg as instructed by [facility] for insomnia. He also reports taking Benadryl and Zquill. This morning patient woke up, feeing in usual state of health and went for a walk around noon. Since noon he cannot recollect any events - unsure if he took any illicit substances. He met his mom to go to [facility] around 1PM, there complained of lightheadedness. Mom reports he then became "very stiff" and she barely got him to a bench outside the store. She lowered him to the bench, she noticed his lips became cyanotic and he passed out. Bystanders started CPR, EMS came gave him narcan after which patient came back to baseline mental status. No head trauma, preceding nausea/diaphoresis, seizure like activity, tongue biting, bowel/bladder incontinence. Patient denies any similar symptoms in the past, denies SI/HI. Also complaining of left sided abdominal pain, mom reports bystander might have had their knee in that area whilst doing CPR. He denies fevers, chills, cp, sob, n/v/d, dysuria, sick contacts, recent travel.

## 2022-07-24 NOTE — ED ADULT NURSE NOTE - OBJECTIVE STATEMENT
patient arrives to the ER A&Ox4, ambulatory, with mother at bedside who states patient "was acting odd at [employer] place and felt dizzy and passed out". per EMS patient was having agonal breaths and bystanders were doing CPR. PD administered Narcan 0.4 nasally, and c/o headache. 18 butterfly placed in left arm by EMS patient denies any drug use however has a PMH opoid addiction, patient d/c ed from [facility] last week.

## 2022-07-24 NOTE — H&P ADULT - PROBLEM SELECTOR PLAN 5
- low improve score - no pharmacological ppx indicated Patient has not been able to sleep for the past two night  - c/w home Trazadone 50mg qhs + Mirtazapine 15mg daily   - psychiatry consulted, follow up recommendations

## 2022-07-24 NOTE — H&P ADULT - NSHPREVIEWOFSYSTEMS_GEN_ALL_CORE
Review of Systems:   CONSTITUTIONAL: No fever, no fatigue  EYES: No eye pain or discharge  ENMT:  No sinus or throat pain  NECK: No pain or stiffness  RESPIRATORY: No cough, wheezing, chills or hemoptysis; No shortness of breath  CARDIOVASCULAR: No chest pain, palpitations, dizziness, or leg swelling  GASTROINTESTINAL: +abdominal pain   GENITOURINARY: No dysuria or incontinence  MUSCULOSKELETAL: No joint pain or swelling; No muscle, back, or extremity pain  NEUROLOGICAL: No headaches, memory loss, loss of strength, numbness, or tremors  PSYCHIATRIC: +anxiety  SKIN: No rashes, no skin changes

## 2022-07-24 NOTE — ED ADULT TRIAGE NOTE - CHIEF COMPLAINT QUOTE
mom states " He was acting odd at [facility] place and felt dizzy and passed out". as per EMS patient was having agonal breaths and bystanders were doing CPR Narcan0.4 IN given by PD patient is awake , c/o head ache. 18 Angiocath left arm by ems mom states " He was acting odd at [facility] place and felt dizzy and passed out". as per EMS patient was having agonal breaths and bystanders were doing CPR Narcan0.4 IN given by PD patient is awake , c/o head ache. 18 Angiocath left arm by ems patient denies any drug use, patient d/c ed from [facility]  last week.

## 2022-07-24 NOTE — H&P ADULT - PROBLEM SELECTOR PLAN 1
?episode of syncope vs. cardiac arrest with LOC and CPR by bystanders, s/p Narcan with EMS  - no head trauma, non focal neurological exam - no indication for CTH  - check cardiac enzymes, orthostats, tele monitor, TTE  - patient cannot recall whether he took any illicit substances - s.tox neg, check u.tox

## 2022-07-25 LAB
ANION GAP SERPL CALC-SCNC: 9 MMOL/L — SIGNIFICANT CHANGE UP (ref 7–14)
BASOPHILS # BLD AUTO: 0.06 K/UL — SIGNIFICANT CHANGE UP (ref 0–0.2)
BASOPHILS NFR BLD AUTO: 0.7 % — SIGNIFICANT CHANGE UP (ref 0–2)
BUN SERPL-MCNC: 6 MG/DL — LOW (ref 7–23)
CALCIUM SERPL-MCNC: 9.4 MG/DL — SIGNIFICANT CHANGE UP (ref 8.4–10.5)
CHLORIDE SERPL-SCNC: 102 MMOL/L — SIGNIFICANT CHANGE UP (ref 98–107)
CK MB BLD-MCNC: 1.4 % — SIGNIFICANT CHANGE UP (ref 0–2.5)
CK MB CFR SERPL CALC: 1.8 NG/ML — SIGNIFICANT CHANGE UP
CK SERPL-CCNC: 128 U/L — SIGNIFICANT CHANGE UP (ref 30–200)
CO2 SERPL-SCNC: 27 MMOL/L — SIGNIFICANT CHANGE UP (ref 22–31)
CREAT SERPL-MCNC: 0.68 MG/DL — SIGNIFICANT CHANGE UP (ref 0.5–1.3)
EGFR: 126 ML/MIN/1.73M2 — SIGNIFICANT CHANGE UP
EOSINOPHIL # BLD AUTO: 0.27 K/UL — SIGNIFICANT CHANGE UP (ref 0–0.5)
EOSINOPHIL NFR BLD AUTO: 3.3 % — SIGNIFICANT CHANGE UP (ref 0–6)
GLUCOSE SERPL-MCNC: 94 MG/DL — SIGNIFICANT CHANGE UP (ref 70–99)
HCT VFR BLD CALC: 37.8 % — LOW (ref 39–50)
HGB BLD-MCNC: 12.5 G/DL — LOW (ref 13–17)
IANC: 3.79 K/UL — SIGNIFICANT CHANGE UP (ref 1.8–7.4)
IMM GRANULOCYTES NFR BLD AUTO: 0.2 % — SIGNIFICANT CHANGE UP (ref 0–1.5)
LYMPHOCYTES # BLD AUTO: 3.25 K/UL — SIGNIFICANT CHANGE UP (ref 1–3.3)
LYMPHOCYTES # BLD AUTO: 39.9 % — SIGNIFICANT CHANGE UP (ref 13–44)
MAGNESIUM SERPL-MCNC: 1.9 MG/DL — SIGNIFICANT CHANGE UP (ref 1.6–2.6)
MCHC RBC-ENTMCNC: 31.4 PG — SIGNIFICANT CHANGE UP (ref 27–34)
MCHC RBC-ENTMCNC: 33.1 GM/DL — SIGNIFICANT CHANGE UP (ref 32–36)
MCV RBC AUTO: 95 FL — SIGNIFICANT CHANGE UP (ref 80–100)
MONOCYTES # BLD AUTO: 0.75 K/UL — SIGNIFICANT CHANGE UP (ref 0–0.9)
MONOCYTES NFR BLD AUTO: 9.2 % — SIGNIFICANT CHANGE UP (ref 2–14)
NEUTROPHILS # BLD AUTO: 3.79 K/UL — SIGNIFICANT CHANGE UP (ref 1.8–7.4)
NEUTROPHILS NFR BLD AUTO: 46.7 % — SIGNIFICANT CHANGE UP (ref 43–77)
NRBC # BLD: 0 /100 WBCS — SIGNIFICANT CHANGE UP
NRBC # FLD: 0 K/UL — SIGNIFICANT CHANGE UP
PHOSPHATE SERPL-MCNC: 3.9 MG/DL — SIGNIFICANT CHANGE UP (ref 2.5–4.5)
PLATELET # BLD AUTO: 243 K/UL — SIGNIFICANT CHANGE UP (ref 150–400)
POTASSIUM SERPL-MCNC: 3.7 MMOL/L — SIGNIFICANT CHANGE UP (ref 3.5–5.3)
POTASSIUM SERPL-SCNC: 3.7 MMOL/L — SIGNIFICANT CHANGE UP (ref 3.5–5.3)
PROCALCITONIN SERPL-MCNC: 0.05 NG/ML — SIGNIFICANT CHANGE UP (ref 0.02–0.1)
RBC # BLD: 3.98 M/UL — LOW (ref 4.2–5.8)
RBC # FLD: 12.6 % — SIGNIFICANT CHANGE UP (ref 10.3–14.5)
SODIUM SERPL-SCNC: 138 MMOL/L — SIGNIFICANT CHANGE UP (ref 135–145)
TROPONIN T, HIGH SENSITIVITY RESULT: 16 NG/L — SIGNIFICANT CHANGE UP
WBC # BLD: 8.14 K/UL — SIGNIFICANT CHANGE UP (ref 3.8–10.5)
WBC # FLD AUTO: 8.14 K/UL — SIGNIFICANT CHANGE UP (ref 3.8–10.5)

## 2022-07-25 PROCEDURE — 99223 1ST HOSP IP/OBS HIGH 75: CPT

## 2022-07-25 PROCEDURE — 99232 SBSQ HOSP IP/OBS MODERATE 35: CPT | Mod: GC

## 2022-07-25 PROCEDURE — 93306 TTE W/DOPPLER COMPLETE: CPT | Mod: 26

## 2022-07-25 RX ORDER — LIDOCAINE 4 G/100G
1 CREAM TOPICAL DAILY
Refills: 0 | Status: DISCONTINUED | OUTPATIENT
Start: 2022-07-25 | End: 2022-08-04

## 2022-07-25 RX ORDER — SODIUM CHLORIDE 9 MG/ML
1000 INJECTION, SOLUTION INTRAVENOUS
Refills: 0 | Status: DISCONTINUED | OUTPATIENT
Start: 2022-07-25 | End: 2022-07-25

## 2022-07-25 RX ORDER — LIDOCAINE 4 G/100G
1 CREAM TOPICAL ONCE
Refills: 0 | Status: COMPLETED | OUTPATIENT
Start: 2022-07-25 | End: 2022-07-25

## 2022-07-25 RX ORDER — ACETAMINOPHEN 500 MG
650 TABLET ORAL ONCE
Refills: 0 | Status: COMPLETED | OUTPATIENT
Start: 2022-07-25 | End: 2022-07-25

## 2022-07-25 RX ORDER — NICOTINE POLACRILEX 2 MG
2 GUM BUCCAL EVERY 4 HOURS
Refills: 0 | Status: DISCONTINUED | OUTPATIENT
Start: 2022-07-25 | End: 2022-08-04

## 2022-07-25 RX ORDER — TRAZODONE HCL 50 MG
100 TABLET ORAL AT BEDTIME
Refills: 0 | Status: DISCONTINUED | OUTPATIENT
Start: 2022-07-25 | End: 2022-07-27

## 2022-07-25 RX ADMIN — SODIUM CHLORIDE 75 MILLILITER(S): 9 INJECTION, SOLUTION INTRAVENOUS at 12:49

## 2022-07-25 RX ADMIN — LIDOCAINE 1 PATCH: 4 CREAM TOPICAL at 16:48

## 2022-07-25 RX ADMIN — MIRTAZAPINE 15 MILLIGRAM(S): 45 TABLET, ORALLY DISINTEGRATING ORAL at 21:35

## 2022-07-25 RX ADMIN — LIDOCAINE 1 PATCH: 4 CREAM TOPICAL at 07:52

## 2022-07-25 RX ADMIN — LIDOCAINE 1 PATCH: 4 CREAM TOPICAL at 12:49

## 2022-07-25 RX ADMIN — LIDOCAINE 1 PATCH: 4 CREAM TOPICAL at 19:11

## 2022-07-25 RX ADMIN — MIRTAZAPINE 15 MILLIGRAM(S): 45 TABLET, ORALLY DISINTEGRATING ORAL at 00:04

## 2022-07-25 RX ADMIN — Medication 650 MILLIGRAM(S): at 03:43

## 2022-07-25 RX ADMIN — SODIUM CHLORIDE 75 MILLILITER(S): 9 INJECTION, SOLUTION INTRAVENOUS at 00:04

## 2022-07-25 RX ADMIN — Medication 2 MILLIGRAM(S): at 02:43

## 2022-07-25 RX ADMIN — Medication 650 MILLIGRAM(S): at 04:43

## 2022-07-25 RX ADMIN — LIDOCAINE 1 PATCH: 4 CREAM TOPICAL at 00:44

## 2022-07-25 NOTE — BH CONSULTATION LIAISON ASSESSMENT NOTE - SUMMARY
The patient is a 32-year-old man with a history of polysubstance use disorder presenting to [facility] after syncope vs cardiac arrest. Patient was recently discharged from  on 7/22. He presented to [facility] with symptoms of psychosis and lito in the context of substance use (THC, Percocet, possibly Suboxone, possibly Xanax) and lack of outpatient treatment. He was treated for suspected benzo w/d delirium, discharged on Librium 30mg BID with plan to taper outpatient. Pt now readmitted s/p Narcan resuscitation today. This morning patient woke up, feeing in usual state of health and went for a walk around noon. Since noon he cannot recollect any events. Mother found patient's morning librium in the trash, and found receipts for cash withdrawal, money transfer, and Uber on pt's phone. Pt's urine positive for opiates. Patient then became lightheaded around 1pm, then stiff, then cyanotic requiring CPR, was revived s/p 2 doses Narcan. Psychiatry was consulted for med recs and management of withdrawal. On eval, patient is calm, cooperative. Not exhibiting signs and symptoms of withdrawal at this time other than abdominal pain (patient also with abdominal distention on CTAP). Patient has been declining Librium in hospital.    Plan:   - DISCONTINUE Librium taper  - START symptom-triggered CIWA with Ativan 2mg  - continue remeron 15mg QHS  - continue trazodone 50mg QHS  - SBIRT referral, patient would benefit from rehab  - psych will follow  The patient is a 32-year-old man with a history of polysubstance use disorder presenting to [facility] after syncope vs cardiac arrest. Patient was recently discharged from  on 7/22. He presented to [facility] with symptoms of psychosis and lito in the context of substance use (THC, Percocet, possibly Suboxone, possibly Xanax) and lack of outpatient treatment. He was treated for suspected benzo w/d delirium, discharged on Librium 30mg BID with plan to taper outpatient. Pt now readmitted s/p Narcan resuscitation today. This morning patient woke up, feeing in usual state of health and went for a walk around noon. Since noon he cannot recollect any events. Mother found patient's morning librium in the trash, and found receipts for cash withdrawal, money transfer, and Uber on pt's phone. Pt's urine positive for opiates. Patient then became lightheaded around 1pm, then stiff, then cyanotic requiring CPR, was revived s/p 2 doses Narcan. Psychiatry was consulted for med recs and management of withdrawal. On eval, patient is calm, cooperative. Not exhibiting signs and symptoms of withdrawal at this time other than abdominal pain (patient also with abdominal distention on CTAP). Patient has been declining Librium in hospital. Unclear why pt's memory of events leading up to arrest is so poor- he does not appear to be withholding other information. unclear if guarded about drug use vs actual memory loss. If actual memory loss, could benzo withdrawal delirium -- patient appears to have been spitting out doses of Librium at home. Possible contribution of benadryl + ZZquil the night before as well although patient felt fine this morning. No SI, HI.    Plan:   - DISCONTINUE Librium taper  - START symptom-triggered CIWA with Ativan 2mg  - continue remeron 15mg QHS  - continue trazodone 50mg QHS  - SBIRT referral, patient would benefit from rehab  - psych will follow  The patient is a 32-year-old man with a history of polysubstance use disorder presenting to [facility] after syncope vs cardiac arrest. Patient was recently discharged from  on 7/22. He presented to [facility] with symptoms of psychosis and lito in the context of substance use (THC, Percocet, possibly Suboxone, possibly Xanax) and lack of outpatient treatment. He was treated for suspected benzo w/d delirium, discharged on Librium 30mg BID with plan to taper outpatient. Pt now readmitted s/p Narcan resuscitation today. This morning patient woke up, feeing in usual state of health and went for a walk around noon. Since noon he cannot recollect any events. Mother found patient's morning librium in the trash, and found receipts for cash withdrawal, money transfer, and Uber on pt's phone. Pt's urine positive for opiates. Patient then became lightheaded around 1pm, then stiff, then cyanotic requiring CPR, was revived s/p 2 doses Narcan. Psychiatry was consulted for med recs and management of withdrawal. On eval, patient is calm, cooperative. Not exhibiting signs and symptoms of withdrawal at this time other than abdominal pain (patient also with abdominal distention on CTAP). Patient has been declining Librium in hospital. Unclear why pt's memory of events leading up to arrest is so poor- he does not appear to be withholding other information. unclear if guarded about drug use vs actual memory loss. If actual memory loss, could benzo withdrawal delirium -- patient appears to have been spitting out doses of Librium at home. Possible contribution of benadryl + ZZquil the night before as well although patient felt fine this morning. No SI, HI.    Plan:   - DISCONTINUE Librium taper  - START symptom-triggered CIWA with Ativan 2mg  - continue remeron 15mg QHS  - INCREASED trazodone PRN to 100mg QHS   - SBIRT referral, patient would benefit from rehab  - psych will follow  The patient is a 32-year-old man with a history of polysubstance use disorder presenting to [facility] after syncope vs cardiac arrest. Patient was recently discharged from  on 7/22. He presented to [facility] with symptoms of psychosis and lito in the context of substance use (THC, Percocet, possibly Suboxone, possibly Xanax) and lack of outpatient treatment. He was treated for suspected benzo w/d delirium, discharged on Librium 30mg BID with plan to taper outpatient. Pt now readmitted s/p Narcan resuscitation today. This morning patient woke up, feeing in usual state of health and went for a walk around noon. Since noon he cannot recollect any events. Mother found patient's morning librium in the trash, and found receipts for cash withdrawal, money transfer, and Uber on pt's phone. Pt's urine positive for opiates. Patient then became lightheaded around 1pm, then stiff, then cyanotic requiring CPR, was revived s/p 2 doses Narcan. Psychiatry was consulted for med recs and management of withdrawal. On eval, patient is calm, cooperative. Not exhibiting signs and symptoms of withdrawal at this time other than abdominal pain (patient also with abdominal distention on CTAP). Patient has been declining Librium in hospital. Unclear why pt's memory of events leading up to arrest is so poor- he does not appear to be withholding other information. unclear if guarded about drug use vs actual memory loss. If actual memory loss, could benzo withdrawal delirium -- patient appears to have been spitting out doses of Librium at home. Possible contribution of benadryl + ZZquil the night before as well although patient felt fine this morning. No SI, HI.    Plan:   - DISCONTINUE Librium taper - patient has been refusing, not exhibiting signs of withdrawal, prefer symptom triggered taper in inpatient setting to reduce benzodiazepine burden  - START symptom-triggered CIWA with Ativan 2mg  - continue remeron 15mg QHS  - INCREASED trazodone PRN to 100mg QHS   - SBIRT referral, patient would benefit from rehab  - psych will follow  The patient is a 32-year-old man with a history of polysubstance use disorder presenting to [facility] after syncope vs cardiac arrest. Patient was recently discharged from [facility] on 7/22. He presented to [facility] with symptoms of psychosis and lito in the context of substance use (THC, Percocet, possibly Suboxone, possibly Xanax) and lack of outpatient treatment. He was treated for suspected benzo w/d delirium, discharged on Librium 30mg BID with plan to taper outpatient. Pt now readmitted s/p Narcan resuscitation today. This morning patient woke up, feeing in usual state of health and went for a walk around noon. Since noon he cannot recollect any events. Mother found patient's morning librium in the trash, and found receipts for cash withdrawal, money transfer, and Uber on pt's phone. Pt's urine positive for opiates. Patient then became lightheaded around 1pm, then stiff, then cyanotic requiring CPR, was revived s/p 2 doses Narcan. Psychiatry was consulted for med recs and management of withdrawal. On eval, patient is calm, cooperative. Not exhibiting signs and symptoms of withdrawal at this time other than abdominal pain (patient also with abdominal distention on CTAP). Patient has been declining Librium in hospital. Unclear why pt's memory of events leading up to arrest is so poor- he does not appear to be withholding other information. unclear if guarded about drug use vs actual memory loss. If actual memory loss, could be ?benzo withdrawal delirium -- patient appears to have been spitting out doses of Librium at home. Possible contribution of benadryl + ZZquil the night before as well although patient felt fine this morning. No SI, HI. No AVH. No delusions.    Plan:   - May DISCONTINUE Librium taper - patient has been refusing, not exhibiting signs of withdrawal, prefer symptom triggered taper in inpatient setting to reduce benzodiazepine burden, ****Recommend to continue to monitor for benzo. withdrawal and if concerns for withdrawal, low threshold to start Ativan taper.  - START symptom-triggered CIWA with Ativan PRN as per CIWA protocol  - continue remeron 15mg QHS  - INCREASED trazodone PRN to 100mg QHS   - SBIRT referral, patient would benefit from rehab  - psych will follow

## 2022-07-25 NOTE — BH CONSULTATION LIAISON ASSESSMENT NOTE - NSSUICRSKFACTOR_PSY_ALL_CORE
Current and Past Psychiatric Diagnoses/Presenting Symptoms/Historical Factors Current and Past Psychiatric Diagnoses/Presenting Symptoms/Historical Factors/Activating Events/Stressors

## 2022-07-25 NOTE — BH CONSULTATION LIAISON ASSESSMENT NOTE - DIFFERENTIAL
Poly substance use disorder - patient on benzo taper + likely opioid ingestion leading to CNS depression    Unclear why pt's memory poor- does not appear to be withholding other information. Guarded vs actual memory loss unclear. Possible contribution of benadryl + ZZquil the night before although patient felt fine this morning.    Poly substance use disorder

## 2022-07-25 NOTE — BH CONSULTATION LIAISON ASSESSMENT NOTE - NSBHATTESTCOMMENTATTENDFT_PSY_A_CORE
Chart reviewed, pt. seen/evaluated with trainee, I agree with above assessment/plan. Patient oriented, well engaged and overall euthymic. Patient denies feeling of hopelessness, denies feeling depressed, firmly denies SI and HI. Denies AVH. No tremors, sweating noted on exam.  Care coordinated with patient's mother at bedside. Plan as above, will follow
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
## 2022-07-25 NOTE — BH CONSULTATION LIAISON ASSESSMENT NOTE - NSBHCHARTREVIEWVS_PSY_A_CORE FT
Vital Signs Last 24 Hrs  T(C): 36.5 (25 Jul 2022 05:30), Max: 37.9 (24 Jul 2022 13:48)  T(F): 97.7 (25 Jul 2022 05:30), Max: 100.3 (24 Jul 2022 13:48)  HR: 73 (25 Jul 2022 05:30) (73 - 118)  BP: 121/75 (25 Jul 2022 05:30) (112/83 - 143/97)  BP(mean): 90 (24 Jul 2022 20:42) (90 - 90)  RR: 18 (25 Jul 2022 05:30) (12 - 18)  SpO2: 98% (25 Jul 2022 05:30) (98% - 100%)    Parameters below as of 25 Jul 2022 05:30  Patient On (Oxygen Delivery Method): room air

## 2022-07-25 NOTE — BH CONSULTATION LIAISON ASSESSMENT NOTE - NSBHSABEN_PSY_A_CORE FT
Xanax use. C/f bdz withdrawal delirium on prior [facility] admission. Patient was d/gini on Librium taper.

## 2022-07-25 NOTE — BH CONSULTATION LIAISON ASSESSMENT NOTE - RISK ASSESSMENT
Acute risk factors include: insomnia, impulsivity, active substance use, recent inpatient discharge.  Chronic risk factors for suicide include:  single, male, h/o prior psychiatric admissions, diagnosis of substance use disorder.    Protective factors include: Young, healthy, denies SI/I/P, no history of suicide attempts, no history of NSSIB, identifies reasons for living, fear of death/dying due to pain/suffering, future oriented, no prior psychiatric admissions, no active substance use, no active psychosis, engaged in work or school, dependent children, stable housing, intact marriage, strong social supports, high spirituality, positive therapeutic relationship, engaged in treatment, ability to cope with stress, high frustration tolerance, medication/follow up compliance, help-seeking behaviors, no legal history, adequate outpatient follow up with motivation to participate in care. Acute risk factors include: insomnia, impulsivity, active substance use, recent inpatient discharge.  Chronic risk factors for suicide include:  single, male, h/o prior psychiatric admissions, diagnosis of substance use disorder.    Protective factors include: Young, healthy, denies SI/I/P, no history of suicide attempts, no history of NSSIB, identifies reasons for living, fear of death/dying due to pain/suffering, future oriented, , no active psychosis, stable housing,  strong social supports, high spirituality, positive therapeutic relationship, engaged in treatment, ability to cope with stress, high frustration tolerance, medication/follow up compliance, help-seeking behaviors, no legal history, adequate outpatient follow up with motivation to participate in care.

## 2022-07-25 NOTE — BH CONSULTATION LIAISON ASSESSMENT NOTE - CURRENT MEDICATION
MEDICATIONS  (STANDING):  chlordiazePOXIDE 30 milliGRAM(s) Oral two times a day  lactated ringers. 1000 milliLiter(s) (75 mL/Hr) IV Continuous <Continuous>  mirtazapine 15 milliGRAM(s) Oral at bedtime    MEDICATIONS  (PRN):  nicotine  Polacrilex Gum 2 milliGRAM(s) Oral every 4 hours PRN Nicotine cravings  traZODone 50 milliGRAM(s) Oral at bedtime PRN Insomnia

## 2022-07-25 NOTE — PATIENT PROFILE ADULT - FALL HARM RISK - HARM RISK INTERVENTIONS

## 2022-07-25 NOTE — BH CONSULTATION LIAISON ASSESSMENT NOTE - OTHER PAST PSYCHIATRIC HISTORY (INCLUDE DETAILS REGARDING ONSET, COURSE OF ILLNESS, INPATIENT/OUTPATIENT TREATMENT)
He states he saw a psychiatrist 1-2 years ago never on psychiatric medications until recent [facility] admission    Patient discharged on:  chlordiazePOXIDE 10 mg oral capsule: 3 cap(s) orally 2 times a day MDD:6 capsules (60mg)  mirtazapine 15 mg oral tablet: 1 tab(s) orally once a day (at bedtime)  Nicorette White Ice Mint 4 mg oral transmucosal gum: 1 gum chewed 10 times a day, As Needed   traZODone 50 mg oral tablet: 1 tab(s) orally once a day (at bedtime), As needed, Insomnia
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
## 2022-07-25 NOTE — CONSULT NOTE ADULT - SUBJECTIVE AND OBJECTIVE BOX
SURGERY CONSULT NOTE  --------------------------------------------------------------------------------------------    HPI:   32M with hx of polysubstance use (Percocet, Xanax, Marijuana), recent discharge from [facility] after stay for psychosis and lito in the context of substance abuse, who presents after ?syncope vs. arrest. Patient was recently discharged from [facility] two days ago. Since discharge he has had difficulty sleeping, last night took Trazadone 50mg then an additional 50mg as instructed by [facility] for insomnia. He also reports taking Benadryl and Zquill. This morning patient woke up, feeing in usual state of health and went for a walk around noon. Since noon he cannot recollect any events - unsure if he took any illicit substances. [facility], there complained of lightheadedness. Mom reports he then became "very stiff" and she barely got him to a bench outside the store. She lowered him to the bench, she noticed his lips became cyanotic and he passed out. Bystanders started CPR, EMS came gave him narcan after which patient came back to baseline mental status. No head trauma, preceding nausea/diaphoresis, seizure like activity, tongue biting, bowel/bladder incontinence. Patient denies any similar symptoms in the past, denies SI/HI. Also complaining of left sided abdominal pain, mom reports bystander might have had their knee in that area whilst doing CPR. He denies fevers, chills, cp, sob, n/v/d, dysuria, sick contacts, recent travel.  Surgery consulted for CT findings of malrotation without radiographic evidence of obstruction, no clinical signs of obstruction, passing gas, last BM yesterday, flank pain likely in setting of recent CPR.  Reports intermittent days of mild discomfort and occasional vomiting.  Was hospitalized once with constipation has never had colonoscopy or EGD, denies prior intraabdominal surgeries.     ROS: 10-system review is otherwise negative except HPI above.      PAST MEDICAL & SURGICAL HISTORY:  Opioid abuse, daily use  No significant past surgical history    FAMILY HISTORY:  SOCIAL HISTORY:      ALLERGIES: penicillin (Unknown)      HOME MEDICATIONS:     CURRENT MEDICATIONS  MEDICATIONS (STANDING): chlordiazePOXIDE 30 milliGRAM(s) Oral two times a day  lactated ringers. 1000 milliLiter(s) IV Continuous <Continuous>  mirtazapine 15 milliGRAM(s) Oral at bedtime    MEDICATIONS (PRN):nicotine  Polacrilex Gum 2 milliGRAM(s) Oral every 4 hours PRN Nicotine cravings  traZODone 50 milliGRAM(s) Oral at bedtime PRN Insomnia    --------------------------------------------------------------------------------------------    Vitals:   T(C): 36.5 (07-25-22 @ 05:30), Max: 37.9 (07-24-22 @ 13:48)  HR: 73 (07-25-22 @ 05:30) (73 - 118)  BP: 121/75 (07-25-22 @ 05:30) (112/83 - 143/97)  RR: 18 (07-25-22 @ 05:30) (12 - 18)  SpO2: 98% (07-25-22 @ 05:30) (98% - 100%)  CAPILLARY BLOOD GLUCOSE        CAPILLARY BLOOD GLUCOSE          07-24 @ 07:01  -  07-25 @ 07:00  --------------------------------------------------------  IN:  Total IN: 0 mL    OUT:    Voided (mL): 1480 mL  Total OUT: 1480 mL    Total NET: -1480 mL        Height (cm): 198.1 (07-25 @ 00:00)  Weight (kg): 77.2 (07-25 @ 00:00)  BMI (kg/m2): 19.7 (07-25 @ 00:00)  BSA (m2): 2.11 (07-25 @ 00:00)    PHYSICAL EXAM:   General: NAD, Lying in bed comfortably  Neuro: A+Ox3  HEENT: NC/AT, EOMI  Neck: Soft, supple  Cardio: RRR, nml S1/S2  Resp: Good effort, CTA b/l  GI/Abd: Soft, nondistended, tenderness in right flank, no guarding no rebound, no focal peritonitis     --------------------------------------------------------------------------------------------    LABS  CBC (07-25 @ 06:17)                              12.5<L>                         8.14    )----------------(  243        46.7  % Neutrophils, 39.9  % Lymphocytes, ANC: 3.79                                37.8<L>  CBC (07-24 @ 15:00)                              14.0                           11.67<H>  )----------------(  299        81.9<H>% Neutrophils, 10.0<L>% Lymphocytes, ANC: 9.54<H>                              41.8      BMP (07-25 @ 06:17)             138     |  102     |  6<L>  		Ca++ --      Ca 9.4                ---------------------------------( 94    		Mg 1.90               3.7     |  27      |  0.68  			Ph 3.9     BMP (07-24 @ 15:00)             137     |  100     |  12    		Ca++ --      Ca 9.6                ---------------------------------( 102<H>		Mg --                 4.8     |  28      |  1.01  			Ph --        LFTs (07-24 @ 15:00)      TPro 7.6 / Alb 5.1<H> / TBili 0.2 / DBili -- / AST 29 / ALT 29 / AlkPhos 69      Cardiac Markers (07-25 @ 00:37)     HSTrop: -- / CKMB: -- / CK: 128  Cardiac Markers (07-24 @ 18:30)     HSTrop: -- / CKMB: -- / CK: 60        --------------------------------------------------------------------------------------------    MICROBIOLOGY      --------------------------------------------------------------------------------------------    IMAGING  < from: CT Abdomen and Pelvis w/ IV Cont (07.24.22 @ 18:48) >  CT ABDOMEN AND PELVIS IC                          PROCEDURE DATE:  07/24/2022          INTERPRETATION:  CLINICAL INFORMATION: Abdominal pain. History of bowel   malrotation. History of substance abuse, question cardiac arrest.    COMPARISON: Contrast-enhanced trauma CT of the abdomen and pelvis March 9, 2018.    CONTRAST/COMPLICATIONS:  IV Contrast: Omnipaque 350  60 cc administered   0 cc discarded  Oral Contrast: NONE  Complications: None reported at time of study completion    PROCEDURE:  CT of the Abdomen and Pelvis was performed.  Sagittal and coronal reformats were performed.    FINDINGS:  LOWER CHEST: Within normal limits.    LIVER: Within normal limits.  BILE DUCTS: Normal caliber.  GALLBLADDER: Within normal limits.  SPLEEN: Within normal limits.  PANCREAS: Within normal limits.  ADRENALS: Within normal limits.  KIDNEYS/URETERS: Within normal limits.    BLADDER: The bladder is distended.  REPRODUCTIVE ORGANS: Prostate within normal limits.    BOWEL: The stomach is markedly distended with fluid and food matter.   Again is noted complete bowel malrotation. Otherwise, small and large   bowel loops are unremarkable with no evidence of obstruction or   inflammatory stranding of the mesenteric fat. The mesenteric vessels   opacify unremarkably.  PERITONEUM: No ascites.  VESSELS: Within normal limits.  RETROPERITONEUM/LYMPH NODES: No lymphadenopathy.  ABDOMINAL WALL: Within normal limits.  BONES: Within normal limits.    IMPRESSION: Distended stomach with fluid and food matter. Consider NG   tube decompression. Incidental complete bowel malrotation without   evidence of small bowel obstruction. Results discussed with Dr. Donis at time of interpretation.

## 2022-07-25 NOTE — BH CONSULTATION LIAISON ASSESSMENT NOTE - HOMICIDALITY / AGGRESSION (CURRENT/PAST)
2017  Dear Beau Wen Khalil, Monse Rossi, and Mell Vallecillo:  Your patient: Aneudy Bergeron  : 1948  Last four SSN: [id number]  Is scheduled for a sleep follow up at [facility]   Follow up Date: 2017 @ 3pm with Dr. Geoff Salazar  Final results will be faxed to: [phone]  Patient will also have a copy of the final results mailed to them.  Final results will not be discussed with patient if no follow up in our center is requested.  Patient is instructed to follow up with you for final result and further recommendations/orders, etc.  Please contact our  staff directly with questions/concerns.  Phone [phone], Fax [phone]  Thank you for the referral.  Sincerely,   Sherlyn Anaya MD  Medical Director [facility]  Geoff Salazar MD  Medical Director [facility]  
Yes

## 2022-07-25 NOTE — BH CONSULTATION LIAISON ASSESSMENT NOTE - NSBHCHARTREVIEWLAB_PSY_A_CORE FT
12.5   8.14  )-----------( 243      ( 25 Jul 2022 06:17 )             37.8   07-25    138  |  102  |  6<L>  ----------------------------<  94  3.7   |  27  |  0.68    Ca    9.4      25 Jul 2022 06:17  Phos  3.9     07-25  Mg     1.90     07-25    TPro  7.6  /  Alb  5.1<H>  /  TBili  0.2  /  DBili  x   /  AST  29  /  ALT  29  /  AlkPhos  69  07-24

## 2022-07-25 NOTE — CONSULT NOTE ADULT - ATTENDING COMMENTS
Roberto has the above history and presentation. In the setting of asymptomatic malrotation, there is no surgical correction warranted. He is passing gas and has had BMs. Con't current medical therapy  Avoid constipation via hydration, stool softeners   Please call if clinical status changes with a concern for an intraabdominal process.     Reymundo Garibay MD  Acute and Critical Care Surgery    The Acute Care Surgery (B Team) Attending Group Practice:  Dr. Ovidio Restrepo, Dr. Davin Bernardo, Dr. Reymundo Garibay,  Dr. Dewey Mackey and Dr. Nanette Kauffman     Urgent issues - spectra [phone] or [phone]  Nonurgent issues - [phone]  Patient appointments or after hours - [phone]

## 2022-07-25 NOTE — CONSULT NOTE ADULT - ASSESSMENT
Assessment: 32M with hx of polysubstance use (Percocet, Xanax, Marijuana), recent discharge from [facility] after stay for psychosis and lito in the context of substance abuse, who presents after ?syncope vs. arrest. Surgery consulted for incidental findings of malrotation, previously demonstrated on prior CT from 2018, no s/s of obstruction at this time, exam without focal tenderness.      Recommendations:  - Defer NGT at this time as patient is passing gas and stool  - PO challenge CLD then ADAT to regular   - No acute surgical intervention indicated for incidental malrotation without volvulus  - Intermittent PO intolerance and gastric dilation potentially in the setting of polysubstance abuse, consider promotility agent like Reglan  - Encourage OOBAT  - Appreciate psychiatry recs  - Appreciate global management per primary team  - Case discussed with attending Acute Care Surgeon Dr. CEDRICK Garibay  - Please recontact B team surgery with any further questions    Dimitri Guy MD, PGY2  B Team Surgery  [id number]

## 2022-07-26 LAB
ANION GAP SERPL CALC-SCNC: 9 MMOL/L — SIGNIFICANT CHANGE UP (ref 7–14)
BUN SERPL-MCNC: 8 MG/DL — SIGNIFICANT CHANGE UP (ref 7–23)
CALCIUM SERPL-MCNC: 9.2 MG/DL — SIGNIFICANT CHANGE UP (ref 8.4–10.5)
CHLORIDE SERPL-SCNC: 101 MMOL/L — SIGNIFICANT CHANGE UP (ref 98–107)
CO2 SERPL-SCNC: 26 MMOL/L — SIGNIFICANT CHANGE UP (ref 22–31)
CREAT SERPL-MCNC: 0.73 MG/DL — SIGNIFICANT CHANGE UP (ref 0.5–1.3)
EGFR: 123 ML/MIN/1.73M2 — SIGNIFICANT CHANGE UP
GLUCOSE SERPL-MCNC: 107 MG/DL — HIGH (ref 70–99)
HCT VFR BLD CALC: 37.4 % — LOW (ref 39–50)
HGB BLD-MCNC: 12.8 G/DL — LOW (ref 13–17)
MAGNESIUM SERPL-MCNC: 1.8 MG/DL — SIGNIFICANT CHANGE UP (ref 1.6–2.6)
MCHC RBC-ENTMCNC: 31.2 PG — SIGNIFICANT CHANGE UP (ref 27–34)
MCHC RBC-ENTMCNC: 34.2 GM/DL — SIGNIFICANT CHANGE UP (ref 32–36)
MCV RBC AUTO: 91.2 FL — SIGNIFICANT CHANGE UP (ref 80–100)
NRBC # BLD: 0 /100 WBCS — SIGNIFICANT CHANGE UP
NRBC # FLD: 0 K/UL — SIGNIFICANT CHANGE UP
PHOSPHATE SERPL-MCNC: 4.5 MG/DL — SIGNIFICANT CHANGE UP (ref 2.5–4.5)
PLATELET # BLD AUTO: 232 K/UL — SIGNIFICANT CHANGE UP (ref 150–400)
POTASSIUM SERPL-MCNC: 3.6 MMOL/L — SIGNIFICANT CHANGE UP (ref 3.5–5.3)
POTASSIUM SERPL-SCNC: 3.6 MMOL/L — SIGNIFICANT CHANGE UP (ref 3.5–5.3)
RBC # BLD: 4.1 M/UL — LOW (ref 4.2–5.8)
RBC # FLD: 12.7 % — SIGNIFICANT CHANGE UP (ref 10.3–14.5)
SODIUM SERPL-SCNC: 136 MMOL/L — SIGNIFICANT CHANGE UP (ref 135–145)
WBC # BLD: 6.51 K/UL — SIGNIFICANT CHANGE UP (ref 3.8–10.5)
WBC # FLD AUTO: 6.51 K/UL — SIGNIFICANT CHANGE UP (ref 3.8–10.5)

## 2022-07-26 PROCEDURE — 99233 SBSQ HOSP IP/OBS HIGH 50: CPT

## 2022-07-26 RX ADMIN — MIRTAZAPINE 15 MILLIGRAM(S): 45 TABLET, ORALLY DISINTEGRATING ORAL at 21:18

## 2022-07-26 RX ADMIN — LIDOCAINE 1 PATCH: 4 CREAM TOPICAL at 18:56

## 2022-07-26 RX ADMIN — Medication 2 MILLIGRAM(S): at 04:22

## 2022-07-26 RX ADMIN — Medication 100 MILLIGRAM(S): at 22:35

## 2022-07-26 RX ADMIN — LIDOCAINE 1 PATCH: 4 CREAM TOPICAL at 12:31

## 2022-07-26 RX ADMIN — Medication 100 MILLIGRAM(S): at 01:27

## 2022-07-26 RX ADMIN — LIDOCAINE 1 PATCH: 4 CREAM TOPICAL at 04:24

## 2022-07-26 RX ADMIN — Medication 2 MILLIGRAM(S): at 23:18

## 2022-07-26 NOTE — BH CONSULTATION LIAISON PROGRESS NOTE - NSBHASSESSMENTFT_PSY_ALL_CORE
The patient is a 32-year-old man with a history of polysubstance use disorder presenting to [facility] after syncope vs cardiac arrest. Patient was recently discharged from [facility] L3 on 7/22. He presented to [facility] with symptoms of psychosis and lito in the context of substance use (THC, Percocet, possibly Suboxone, possibly Xanax) and lack of outpatient treatment. He was treated for suspected benzo w/d delirium, discharged on Librium 30mg BID with plan to taper outpatient. Pt now readmitted s/p Narcan resuscitation today. Psychiatry was consulted for med recs and management of withdrawal. On eval, patient is calm, cooperative. Not exhibiting signs and symptoms of withdrawal at this time other than abdominal pain (patient also with abdominal distention on CTAP). No SI, HI. No AVH. No delusions.    Plan:   - continue to monitor for benzo. withdrawal and if concerns for withdrawal, low threshold to start Ativan taper.  - continue symptom-triggered CIWA with Ativan PRN as per CIWA protocol  - continue remeron 15mg QHS  - continue trazodone PRN 100mg QHS   - SBIRT referral, patient would benefit from rehab  - psych will follow The patient is a 32-year-old man with a history of polysubstance use disorder presenting to [facility] after syncope vs cardiac arrest. Patient was recently discharged from [facility] L3 on 7/22. He presented to [facility] with symptoms of psychosis and lito in the context of substance use (THC, Percocet, possibly Suboxone, possibly Xanax) and lack of outpatient treatment. He was treated for suspected benzo w/d delirium, discharged on Librium 30mg BID with plan to taper outpatient. Pt now readmitted s/p Narcan resuscitation today. Psychiatry was consulted for med recs and management of withdrawal. On eval, patient is calm, cooperative. Not exhibiting signs and symptoms of withdrawal at this time other than abdominal pain (patient also with abdominal distention on CTAP). No SI, HI. No AVH. No delusions.    Plan:   - continue to monitor for benzo withdrawal and if concerns for withdrawal, low threshold to start Ativan taper.  - continue symptom-triggered CIWA with Ativan PRN as per CIWA protocol  - continue remeron 15mg QHS  - continue trazodone PRN 100mg QHS   - SBIRT referral, patient would benefit from rehab  - psych will follow, though acuity is low and does not meet criteria for inpatient psych care.

## 2022-07-26 NOTE — BH CONSULTATION LIAISON PROGRESS NOTE - NSBHFUPINTERVALHXFT_PSY_A_CORE
Patient seen for follow up for withdrawal management. Chart reviewed. No acute events overnight. Patient compliant with standing medications. Patient was given Ativan 2mg at 4:22AM last night, however CIWA score at that time was 2. On interview today, patient denies withdrawal symptoms. Patient states that he feels well.  Attempted to discuss rehab but patient states that he is tired and wants to sleep more. No SI, HI, AVH.

## 2022-07-26 NOTE — SBIRT NOTE ADULT - NSSBIRTSCREENAVAIL_GEN_A_CORE
Telephonic team reached out to patient at [phone]. Patient agreeable to inpatient rehab at this time. HC will reach out to KATIE Mccoy to request clinicals be sent to [facility]. Patient aware that [facility] does not accept Medicaid at this time.

## 2022-07-27 LAB
ANION GAP SERPL CALC-SCNC: 10 MMOL/L — SIGNIFICANT CHANGE UP (ref 7–14)
ANION GAP SERPL CALC-SCNC: 14 MMOL/L — SIGNIFICANT CHANGE UP (ref 7–14)
BUN SERPL-MCNC: 11 MG/DL — SIGNIFICANT CHANGE UP (ref 7–23)
BUN SERPL-MCNC: 14 MG/DL — SIGNIFICANT CHANGE UP (ref 7–23)
CALCIUM SERPL-MCNC: 9.3 MG/DL — SIGNIFICANT CHANGE UP (ref 8.4–10.5)
CALCIUM SERPL-MCNC: 9.7 MG/DL — SIGNIFICANT CHANGE UP (ref 8.4–10.5)
CHLORIDE SERPL-SCNC: 101 MMOL/L — SIGNIFICANT CHANGE UP (ref 98–107)
CHLORIDE SERPL-SCNC: 102 MMOL/L — SIGNIFICANT CHANGE UP (ref 98–107)
CO2 SERPL-SCNC: 22 MMOL/L — SIGNIFICANT CHANGE UP (ref 22–31)
CO2 SERPL-SCNC: 24 MMOL/L — SIGNIFICANT CHANGE UP (ref 22–31)
CREAT SERPL-MCNC: 0.76 MG/DL — SIGNIFICANT CHANGE UP (ref 0.5–1.3)
CREAT SERPL-MCNC: 0.77 MG/DL — SIGNIFICANT CHANGE UP (ref 0.5–1.3)
EGFR: 121 ML/MIN/1.73M2 — SIGNIFICANT CHANGE UP
EGFR: 122 ML/MIN/1.73M2 — SIGNIFICANT CHANGE UP
GLUCOSE SERPL-MCNC: 135 MG/DL — HIGH (ref 70–99)
GLUCOSE SERPL-MCNC: 95 MG/DL — SIGNIFICANT CHANGE UP (ref 70–99)
HCT VFR BLD CALC: 38.7 % — LOW (ref 39–50)
HCT VFR BLD CALC: 40 % — SIGNIFICANT CHANGE UP (ref 39–50)
HGB BLD-MCNC: 13.3 G/DL — SIGNIFICANT CHANGE UP (ref 13–17)
HGB BLD-MCNC: 13.3 G/DL — SIGNIFICANT CHANGE UP (ref 13–17)
MAGNESIUM SERPL-MCNC: 1.8 MG/DL — SIGNIFICANT CHANGE UP (ref 1.6–2.6)
MAGNESIUM SERPL-MCNC: 1.9 MG/DL — SIGNIFICANT CHANGE UP (ref 1.6–2.6)
MCHC RBC-ENTMCNC: 30.5 PG — SIGNIFICANT CHANGE UP (ref 27–34)
MCHC RBC-ENTMCNC: 31.2 PG — SIGNIFICANT CHANGE UP (ref 27–34)
MCHC RBC-ENTMCNC: 33.3 GM/DL — SIGNIFICANT CHANGE UP (ref 32–36)
MCHC RBC-ENTMCNC: 34.4 GM/DL — SIGNIFICANT CHANGE UP (ref 32–36)
MCV RBC AUTO: 90.8 FL — SIGNIFICANT CHANGE UP (ref 80–100)
MCV RBC AUTO: 91.7 FL — SIGNIFICANT CHANGE UP (ref 80–100)
NRBC # BLD: 0 /100 WBCS — SIGNIFICANT CHANGE UP
NRBC # BLD: 0 /100 WBCS — SIGNIFICANT CHANGE UP
NRBC # FLD: 0 K/UL — SIGNIFICANT CHANGE UP
NRBC # FLD: 0 K/UL — SIGNIFICANT CHANGE UP
PHOSPHATE SERPL-MCNC: 3 MG/DL — SIGNIFICANT CHANGE UP (ref 2.5–4.5)
PHOSPHATE SERPL-MCNC: 3.3 MG/DL — SIGNIFICANT CHANGE UP (ref 2.5–4.5)
PLATELET # BLD AUTO: 252 K/UL — SIGNIFICANT CHANGE UP (ref 150–400)
PLATELET # BLD AUTO: 279 K/UL — SIGNIFICANT CHANGE UP (ref 150–400)
POTASSIUM SERPL-MCNC: 3.6 MMOL/L — SIGNIFICANT CHANGE UP (ref 3.5–5.3)
POTASSIUM SERPL-MCNC: 3.8 MMOL/L — SIGNIFICANT CHANGE UP (ref 3.5–5.3)
POTASSIUM SERPL-SCNC: 3.6 MMOL/L — SIGNIFICANT CHANGE UP (ref 3.5–5.3)
POTASSIUM SERPL-SCNC: 3.8 MMOL/L — SIGNIFICANT CHANGE UP (ref 3.5–5.3)
RBC # BLD: 4.26 M/UL — SIGNIFICANT CHANGE UP (ref 4.2–5.8)
RBC # BLD: 4.36 M/UL — SIGNIFICANT CHANGE UP (ref 4.2–5.8)
RBC # FLD: 12.6 % — SIGNIFICANT CHANGE UP (ref 10.3–14.5)
RBC # FLD: 12.7 % — SIGNIFICANT CHANGE UP (ref 10.3–14.5)
SODIUM SERPL-SCNC: 136 MMOL/L — SIGNIFICANT CHANGE UP (ref 135–145)
SODIUM SERPL-SCNC: 137 MMOL/L — SIGNIFICANT CHANGE UP (ref 135–145)
WBC # BLD: 12.85 K/UL — HIGH (ref 3.8–10.5)
WBC # BLD: 7.95 K/UL — SIGNIFICANT CHANGE UP (ref 3.8–10.5)
WBC # FLD AUTO: 12.85 K/UL — HIGH (ref 3.8–10.5)
WBC # FLD AUTO: 7.95 K/UL — SIGNIFICANT CHANGE UP (ref 3.8–10.5)

## 2022-07-27 PROCEDURE — 99233 SBSQ HOSP IP/OBS HIGH 50: CPT

## 2022-07-27 RX ORDER — HYDROXYZINE HCL 10 MG
50 TABLET ORAL EVERY 8 HOURS
Refills: 0 | Status: DISCONTINUED | OUTPATIENT
Start: 2022-07-27 | End: 2022-08-04

## 2022-07-27 RX ORDER — DIPHENHYDRAMINE HCL 50 MG
50 CAPSULE ORAL AT BEDTIME
Refills: 0 | Status: DISCONTINUED | OUTPATIENT
Start: 2022-07-27 | End: 2022-07-29

## 2022-07-27 RX ORDER — LANOLIN ALCOHOL/MO/W.PET/CERES
6 CREAM (GRAM) TOPICAL AT BEDTIME
Refills: 0 | Status: DISCONTINUED | OUTPATIENT
Start: 2022-07-27 | End: 2022-08-04

## 2022-07-27 RX ORDER — SODIUM CHLORIDE 9 MG/ML
2000 INJECTION INTRAMUSCULAR; INTRAVENOUS; SUBCUTANEOUS
Refills: 0 | Status: DISCONTINUED | OUTPATIENT
Start: 2022-07-27 | End: 2022-07-28

## 2022-07-27 RX ORDER — TRAZODONE HCL 50 MG
150 TABLET ORAL AT BEDTIME
Refills: 0 | Status: DISCONTINUED | OUTPATIENT
Start: 2022-07-27 | End: 2022-07-28

## 2022-07-27 RX ADMIN — Medication 50 MILLIGRAM(S): at 22:48

## 2022-07-27 RX ADMIN — Medication 6 MILLIGRAM(S): at 23:43

## 2022-07-27 RX ADMIN — LIDOCAINE 1 PATCH: 4 CREAM TOPICAL at 10:52

## 2022-07-27 RX ADMIN — Medication 150 MILLIGRAM(S): at 21:56

## 2022-07-27 RX ADMIN — Medication 2 MILLIGRAM(S): at 10:52

## 2022-07-27 RX ADMIN — MIRTAZAPINE 15 MILLIGRAM(S): 45 TABLET, ORALLY DISINTEGRATING ORAL at 21:56

## 2022-07-27 RX ADMIN — LIDOCAINE 1 PATCH: 4 CREAM TOPICAL at 00:02

## 2022-07-27 RX ADMIN — Medication 50 MILLIGRAM(S): at 14:41

## 2022-07-27 RX ADMIN — SODIUM CHLORIDE 125 MILLILITER(S): 9 INJECTION INTRAMUSCULAR; INTRAVENOUS; SUBCUTANEOUS at 11:35

## 2022-07-27 RX ADMIN — LIDOCAINE 1 PATCH: 4 CREAM TOPICAL at 19:08

## 2022-07-27 NOTE — BH CONSULTATION LIAISON PROGRESS NOTE - NSBHFUPINTERVALHXFT_PSY_A_CORE
Patient seen for follow up for withdrawal management. Chart reviewed. No acute events overnight. Patient scoring 0-1 on CIWA taper. Patient has been receiving one time Ativan doses. Discussed with patient today. He states that he has been requesting Ativan and night because he feels the trazodone does not work well enough for insomnia. Psychoeducation provided about the risks of long-term Ativan use, especially in a patient with substance use history. Patient states that last night he felt restless, but that has resolved. Patient remains amenable to inpatient rehab. No SI, HI, AVH/TH.

## 2022-07-27 NOTE — BH CONSULTATION LIAISON PROGRESS NOTE - NSBHASSESSMENTFT_PSY_ALL_CORE
The patient is a 32-year-old man with a history of polysubstance use disorder presenting to [facility] after syncope vs cardiac arrest. Patient was recently discharged from [facility] L3 on 7/22. He presented to [facility] with symptoms of psychosis and lito in the context of substance use (THC, Percocet, possibly Suboxone, possibly Xanax) and lack of outpatient treatment. He was treated for suspected benzo w/d delirium, discharged on Librium 30mg BID with plan to taper outpatient. Pt now readmitted s/p Narcan resuscitation today. Psychiatry was consulted for med recs and management of withdrawal. On eval, patient is calm, cooperative. Not exhibiting signs and symptoms of withdrawal at this time other than abdominal pain (patient also with abdominal distention on CTAP). No SI, HI. No AVH. No delusions.    7/27: Patient scoring 0-1 on CIWA scale, no s/s withdrawal other than occasional anxiety. Patient has been requesting and receiving PO Ativan when feeling anxious or as if he cannot sleep. Will benefit from non-benzodiazepine medications for insomnia and anxiety given extensive substance use history. Will benefit from inpt rehab; patient in agreement.    Plan:   - DISCONTINUE CIWA scale - patient with consistently low CIWA scores, not exhibiting signs of withdrawal.   - continue remeron 15mg QHS  - CHANGE trazodone 100mg QHS to standing rather than PRN  - START benadryl 50mg PO QHS PRN for insomnia  - START atarax 50mg q6h PRN for anxiety  - AVOID one time benzodiazepine doses, encourage alternative medications for anxiety and insomnia - please sign out to night team  - continue coordination with sbirt for inpatient rehab referral  - psych will follow, though acuity is low and does not meet criteria for inpatient psych care.  The patient is a 32-year-old man with a history of polysubstance use disorder presenting to [facility] after syncope vs cardiac arrest. Patient was recently discharged from [facility] L3 on 7/22. He presented to [facility] with symptoms of psychosis and lito in the context of substance use (THC, Percocet, possibly Suboxone, possibly Xanax) and lack of outpatient treatment. He was treated for suspected benzo w/d delirium, discharged on Librium 30mg BID with plan to taper outpatient. Pt now readmitted s/p Narcan resuscitation today. Psychiatry was consulted for med recs and management of withdrawal. On eval, patient is calm, cooperative. Not exhibiting signs and symptoms of withdrawal at this time other than abdominal pain (patient also with abdominal distention on CTAP). No SI, HI. No AVH. No delusions.    7/27: Patient scoring 0-1 on CIWA scale, no s/s withdrawal other than occasional anxiety. Patient has been requesting and receiving PO Ativan when feeling anxious or as if he cannot sleep. Will benefit from non-benzodiazepine medications for insomnia and anxiety given extensive substance use history. Will benefit from inpt rehab; patient in agreement.    Plan:   - DISCONTINUE CIWA scale - patient with consistently low CIWA scores, not exhibiting signs of withdrawal.   - continue remeron 15mg QHS  - INCREASE trazodone to 150mg QHS STANDING  - START benadryl 50mg PO QHS PRN for insomnia  - START atarax 50mg q6h PRN for anxiety  - AVOID one time benzodiazepine doses, encourage alternative medications for anxiety and insomnia - please sign out to night team  - continue coordination with sbirt for inpatient rehab referral  - psych will follow, though acuity is low and does not meet criteria for inpatient psych care.  The patient is a 32-year-old man with a history of polysubstance use disorder presenting to [facility] after syncope vs cardiac arrest. Patient was recently discharged from [facility] L3 on 7/22. He presented to [facility] with symptoms of psychosis and lito in the context of substance use (THC, Percocet, possibly Suboxone, possibly Xanax) and lack of outpatient treatment. He was treated for suspected benzo w/d delirium, discharged on Librium 30mg BID with plan to taper outpatient. Pt now readmitted s/p Narcan resuscitation today. Psychiatry was consulted for med recs and management of withdrawal. On eval, patient is calm, cooperative. Not exhibiting signs and symptoms of withdrawal at this time other than abdominal pain (patient also with abdominal distention on CTAP). No SI, HI. No AVH. No delusions.    7/27: Patient scoring 0-1 on CIWA scale, no s/s withdrawal other than occasional anxiety. Patient has been requesting and receiving PO Ativan when feeling anxious or as if he cannot sleep. Will benefit from non-benzodiazepine medications for insomnia and anxiety given extensive substance use history. Will benefit from inpt rehab; patient in agreement.    Plan:   - DISCONTINUE CIWA scale - patient with consistently low CIWA scores, not exhibiting signs of withdrawal.   - continue remeron 15mg QHS  - INCREASE trazodone to 150mg QHS STANDING  - START benadryl 50mg PO QHS PRN for insomnia  - START atarax 50mg q8h PRN for anxiety  - AVOID one time benzodiazepine doses, encourage alternative medications for anxiety and insomnia - please sign out to night team  - continue coordination with sbirt for inpatient rehab referral  - psych will follow, though acuity is low and does not meet criteria for inpatient psych care.  The patient is a 32-year-old man with a history of polysubstance use disorder presenting to [facility] after syncope vs cardiac arrest. Patient was recently discharged from [facility] L3 on 7/22. He presented to [facility] with symptoms of psychosis and lito in the context of substance use (THC, Percocet, possibly Suboxone, possibly Xanax) and lack of outpatient treatment. He was treated for suspected benzo w/d delirium, discharged on Librium 30mg BID with plan to taper outpatient. Pt now readmitted s/p Narcan resuscitation today. Psychiatry was consulted for med recs and management of withdrawal. On eval, patient is calm, cooperative. Not exhibiting signs and symptoms of withdrawal at this time other than abdominal pain (patient also with abdominal distention on CTAP). No SI, HI. No AVH. No delusions.    7/27: Patient scoring 0-1 on CIWA scale, no s/s withdrawal other than occasional anxiety. Patient has been requesting and receiving PO Ativan when feeling anxious or as if he cannot sleep. Will benefit from non-benzodiazepine medications for insomnia and anxiety given extensive substance use history. Will benefit from inpt rehab; patient in agreement.    Plan:   - DISCONTINUE CIWA scale - patient with consistently low CIWA scores, not exhibiting signs of withdrawal.   - continue remeron 15mg QHS  - INCREASE trazodone to 150mg QHS STANDING  - START benadryl 50mg PO QHS PRN for insomnia  - START atarax 50mg q8h PRN for anxiety  - AVOID one time benzodiazepine doses, encourage alternative medications for anxiety and insomnia - please sign out to night team  - continue coordination with sbirt for inpatient rehab referral  - psych signing off, though acuity is low and does not meet criteria for inpatient psych care.

## 2022-07-28 ENCOUNTER — TRANSCRIPTION ENCOUNTER (OUTPATIENT)
Age: 32
End: 2022-07-28

## 2022-07-28 LAB
ANION GAP SERPL CALC-SCNC: 9 MMOL/L — SIGNIFICANT CHANGE UP (ref 7–14)
BUN SERPL-MCNC: 12 MG/DL — SIGNIFICANT CHANGE UP (ref 7–23)
CALCIUM SERPL-MCNC: 9.4 MG/DL — SIGNIFICANT CHANGE UP (ref 8.4–10.5)
CHLORIDE SERPL-SCNC: 104 MMOL/L — SIGNIFICANT CHANGE UP (ref 98–107)
CO2 SERPL-SCNC: 25 MMOL/L — SIGNIFICANT CHANGE UP (ref 22–31)
CREAT SERPL-MCNC: 0.8 MG/DL — SIGNIFICANT CHANGE UP (ref 0.5–1.3)
EGFR: 120 ML/MIN/1.73M2 — SIGNIFICANT CHANGE UP
GLUCOSE SERPL-MCNC: 104 MG/DL — HIGH (ref 70–99)
HCT VFR BLD CALC: 39.1 % — SIGNIFICANT CHANGE UP (ref 39–50)
HGB BLD-MCNC: 13.3 G/DL — SIGNIFICANT CHANGE UP (ref 13–17)
MAGNESIUM SERPL-MCNC: 1.9 MG/DL — SIGNIFICANT CHANGE UP (ref 1.6–2.6)
MCHC RBC-ENTMCNC: 31.6 PG — SIGNIFICANT CHANGE UP (ref 27–34)
MCHC RBC-ENTMCNC: 34 GM/DL — SIGNIFICANT CHANGE UP (ref 32–36)
MCV RBC AUTO: 92.9 FL — SIGNIFICANT CHANGE UP (ref 80–100)
NRBC # BLD: 0 /100 WBCS — SIGNIFICANT CHANGE UP
NRBC # FLD: 0 K/UL — SIGNIFICANT CHANGE UP
PHOSPHATE SERPL-MCNC: 3.2 MG/DL — SIGNIFICANT CHANGE UP (ref 2.5–4.5)
PLATELET # BLD AUTO: 248 K/UL — SIGNIFICANT CHANGE UP (ref 150–400)
POTASSIUM SERPL-MCNC: 4.2 MMOL/L — SIGNIFICANT CHANGE UP (ref 3.5–5.3)
POTASSIUM SERPL-SCNC: 4.2 MMOL/L — SIGNIFICANT CHANGE UP (ref 3.5–5.3)
RBC # BLD: 4.21 M/UL — SIGNIFICANT CHANGE UP (ref 4.2–5.8)
RBC # FLD: 12.9 % — SIGNIFICANT CHANGE UP (ref 10.3–14.5)
SODIUM SERPL-SCNC: 138 MMOL/L — SIGNIFICANT CHANGE UP (ref 135–145)
WBC # BLD: 5.4 K/UL — SIGNIFICANT CHANGE UP (ref 3.8–10.5)
WBC # FLD AUTO: 5.4 K/UL — SIGNIFICANT CHANGE UP (ref 3.8–10.5)

## 2022-07-28 RX ORDER — LANOLIN ALCOHOL/MO/W.PET/CERES
3 CREAM (GRAM) TOPICAL ONCE
Refills: 0 | Status: COMPLETED | OUTPATIENT
Start: 2022-07-28 | End: 2022-07-28

## 2022-07-28 RX ORDER — TRAZODONE HCL 50 MG
150 TABLET ORAL AT BEDTIME
Refills: 0 | Status: DISCONTINUED | OUTPATIENT
Start: 2022-07-28 | End: 2022-08-01

## 2022-07-28 RX ADMIN — Medication 150 MILLIGRAM(S): at 21:22

## 2022-07-28 RX ADMIN — LIDOCAINE 1 PATCH: 4 CREAM TOPICAL at 19:59

## 2022-07-28 RX ADMIN — MIRTAZAPINE 15 MILLIGRAM(S): 45 TABLET, ORALLY DISINTEGRATING ORAL at 21:22

## 2022-07-28 RX ADMIN — Medication 2 MILLIGRAM(S): at 01:29

## 2022-07-28 RX ADMIN — LIDOCAINE 1 PATCH: 4 CREAM TOPICAL at 11:39

## 2022-07-28 RX ADMIN — LIDOCAINE 1 PATCH: 4 CREAM TOPICAL at 23:03

## 2022-07-28 RX ADMIN — Medication 6 MILLIGRAM(S): at 21:22

## 2022-07-28 RX ADMIN — Medication 2 MILLIGRAM(S): at 16:22

## 2022-07-28 RX ADMIN — Medication 3 MILLIGRAM(S): at 01:29

## 2022-07-28 RX ADMIN — Medication 2 MILLIGRAM(S): at 11:39

## 2022-07-28 RX ADMIN — Medication 2 MILLIGRAM(S): at 20:17

## 2022-07-28 NOTE — DISCHARGE NOTE PROVIDER - NSDCCPCAREPLAN_GEN_ALL_CORE_FT
PRINCIPAL DISCHARGE DIAGNOSIS  Diagnosis: Apnea spell  Assessment and Plan of Treatment: You were found to have a period where you loss consciousness. You had a workup done to evaluate why you loss consciousness but all your tests came back normal. Please follow up with your primary care doctor for further workup and for follow up. If you feel lightheaded, faint, and/or dizzy, please call for help and/or go to the nearest emergency department.      SECONDARY DISCHARGE DIAGNOSES  Diagnosis: Insomnia  Assessment and Plan of Treatment: Please take your medications as prescribed.    Diagnosis: Polysubstance dependence  Assessment and Plan of Treatment: You will be transferred to a drug inpatient program where they can help you.    Diagnosis: Abdominal pain  Assessment and Plan of Treatment: You had abdominal pain and was evaluated by surgery team while in the hospital. Per surgery team, no need for interventions. If you notice an increase in abdominal pain, difficulty having a bowel movement and/or passing gas, and/or blood in the stool, please go to the nearest emergency department.    Diagnosis: Syncope  Assessment and Plan of Treatment: You had an episode where you loss consciounsness and had a syncopable episode. Your workup as to why you had an syncopable episode was normal with no issues. If you feel lightheaded, faint, or feeling like you are going to pass out, call for help and go to the emergency department.     PRINCIPAL DISCHARGE DIAGNOSIS  Diagnosis: Apnea spell  Assessment and Plan of Treatment: You were found to have a period where you loss consciousness. You had a workup done to evaluate why you loss consciousness but all your tests came back normal. Please follow up with your primary care doctor for further workup and for follow up. If you feel lightheaded, faint, and/or dizzy, please call for help and/or go to the nearest emergency department.      SECONDARY DISCHARGE DIAGNOSES  Diagnosis: Syncope  Assessment and Plan of Treatment: You had an episode where you loss consciounsness and had a syncopable episode. Your workup as to why you had an syncopable episode was normal with no issues. If you feel lightheaded, faint, or feeling like you are going to pass out, call for help and go to the emergency department.  please follow up with cardiology following discharge from rehab    Diagnosis: Polysubstance dependence  Assessment and Plan of Treatment: You will be transferred to a drug inpatient program where they can help you.    Diagnosis: Insomnia  Assessment and Plan of Treatment: Please take your medications as prescribed.    Diagnosis: Abdominal pain  Assessment and Plan of Treatment: You had abdominal pain and was evaluated by surgery team while in the hospital. Per surgery team, no need for interventions. If you notice an increase in abdominal pain, difficulty having a bowel movement and/or passing gas, and/or blood in the stool, please go to the nearest emergency department.    Diagnosis: Tachycardia  Assessment and Plan of Treatment: please take lopressor 12,5mg oral twice daily for 4 days and wean off when more ambulatory   follow up with cardiology following discharge from rehab

## 2022-07-28 NOTE — PROGRESS NOTE ADULT - PROBLEM SELECTOR PLAN 5
Patient has not been able to sleep for the past two night  - c/w home Trazadone 50mg qhs + Mirtazapine 15mg daily   - psychiatry f/u
Patient has not been able to sleep for the past two night  - c/w home Trazadone 50mg qhs + Mirtazapine 15mg daily   - psychiatry consult
Patient has not been able to sleep for the past two night  - c/w home Trazadone 50mg qhs + Mirtazapine 15mg daily   - psychiatry consult
Patient has not been able to sleep for the past few nights  - psychiatry f/u

## 2022-07-28 NOTE — DISCHARGE NOTE PROVIDER - NSDCFUADDAPPT_GEN_ALL_CORE_FT
You can make an appointment with Dr. Joshua Fox. Phone number is [phone]. Address [address] [place], [place], [address]

## 2022-07-28 NOTE — DISCHARGE NOTE PROVIDER - CARE PROVIDER_API CALL
Primary Care Doctor,   Phone: (   )    -  Fax: (   )    -  Follow Up Time:    Primary Care Doctor,   Phone: (   )    -  Fax: (   )    -  Follow Up Time:     Joshua Fox)  Cardiology  [address] [id number]  Phone: [phone]  Fax: [phone]  Follow Up Time:

## 2022-07-28 NOTE — DISCHARGE NOTE PROVIDER - HOSPITAL COURSE
32M with hx of polysubstance abuse (Percocet, Xanax, Marijuana), recent discharge from [facility] after stay for psychosis and lito in the context of substance abuse, who presents after syncope, and narcan administered by EMS.       Syncope.   -episode of syncope  with LOC and CPR by bystanders, s/p Narcan with EMS  -telemonitoring  - Cardiology consulted and recs appreciated.     Systemic inflammatory response syndrome   -SIRS positive (temp 100.3, tachycardic, leukocytosis)  - likely reactive  - no focal signs and symptoms of infection  - panculture,  - ID consulted and recs appreciated.      Abdominal pain.   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     .     Polysubstance dependence.   - hx of polysubstance use (Percocet, Xanax, Marijuana), recent discharge from [facility] after stay for psychosis and lito in the context of substance abuse  - c/w home librium 30mg BID - as prescribed by [facility], d/w psych ok to continue  - serum tox negative, check utox   - denies SI/HI - no indication for 1:1 at this time  - psychiatry to adjust meds for intermittent anxiety.    Insomnia.   - Patient has not been able to sleep for the past two night  - c/w home Trazadone 50mg qhs + Mirtazapine 15mg daily   - psychiatry f/u. 32M with hx of polysubstance abuse (Percocet, Xanax, Marijuana), recent discharge from [facility] after stay for psychosis and lito in the context of substance abuse, who presents after syncope, and narcan administered by EMS.       Syncope.   -episode of syncope  with LOC and CPR by bystanders, s/p Narcan with EMS  -telemonitoring  - Cardiology consulted and recs appreciated.     Systemic inflammatory response syndrome   -SIRS positive (temp 100.3, tachycardic, leukocytosis)  - likely reactive  - no focal signs and symptoms of infection  - panculture,  - ID consulted and recs appreciated.      Abdominal pain.   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      Polysubstance dependence.   - hx of polysubstance use (Percocet, Xanax, Marijuana), recent discharge from [facility] after stay for psychosis and lito in the context of substance abuse  - c/w home librium 30mg BID - as prescribed by [facility], d/w psych ok to continue  - serum tox negative, check utox   - denies SI/HI - no indication for 1:1 at this time  - psychiatry to adjust meds for intermittent anxiety.    Insomnia.   - Patient has not been able to sleep for the past two night  - c/w home Trazadone 50mg qhs + Mirtazapine 15mg daily   - psychiatry f/u.    Case discussed with Dr. Smith on August 3, 2022. Patient is medically stable and cleared for discharge. 32M with hx of polysubstance abuse (Percocet, Xanax, Marijuana), recent discharge from [facility] after stay for psychosis and lito in the context of substance abuse, who presents after syncope, and narcan administered by EMS.       Syncope.   -episode of syncope  with LOC and CPR by bystanders, s/p Narcan with EMS  -telemonitoring  - Cardiology consulted and recs appreciated.     Systemic inflammatory response syndrome   -SIRS positive (temp 100.3, tachycardic, leukocytosis)  - likely reactive  - no focal signs and symptoms of infection  - panculture,  - ID consulted and recs appreciated.      Abdominal pain.   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      Polysubstance dependence.   - hx of polysubstance use (Percocet, Xanax, Marijuana), recent discharge from [facility] after stay for psychosis and lito in the context of substance abuse  - c/w home librium 30mg BID - as prescribed by [facility], d/w psych ok to continue  - serum tox negative, check utox   - denies SI/HI - no indication for 1:1 at this time  - psychiatry to adjust meds for intermittent anxiety.    Insomnia.   - Patient has not been able to sleep for the past two night  Psychiatry following during admission - Continue Remeron 30mg QHS for insomnia/mood  - Continue trazodone  200mg QHS for insomnia  - continue atarax 50mg q6h PRN for anxiety    Case discussed with Dr. Smith on August 3, 2022. Patient is medically stable and cleared for discharge. 32M with hx of polysubstance abuse (Percocet, Xanax, Marijuana), recent discharge from [facility] after stay for psychosis and lito in the context of substance abuse, who presents after syncope, and narcan administered by EMS.       Syncope.   -episode of syncope  with LOC and CPR by bystanders, s/p Narcan with EMS  -telemonitoring  - Cardiology consulted and recs appreciated.     Systemic inflammatory response syndrome   -SIRS positive (temp 100.3, tachycardic, leukocytosis)  - likely reactive  - no focal signs and symptoms of infection  - panculture,  - ID consulted and recs appreciated.   - sinus tachycardia while ambulating . will add on low dose beta blocker as discussed with Dr. Smith for 4 days and wean off once patient more ambulatory      Abdominal pain.   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      Polysubstance dependence.   - hx of polysubstance use (Percocet, Xanax, Marijuana), recent discharge from [facility] after stay for psychosis and lito in the context of substance abuse  - c/w home librium 30mg BID - as prescribed by [facility], d/w psych ok to continue  - serum tox negative, check utox   - denies SI/HI - no indication for 1:1 at this time  - psychiatry to adjust meds for intermittent anxiety.    Insomnia.   - Patient has not been able to sleep for the past two night  Psychiatry following during admission - Continue Remeron 30mg QHS for insomnia/mood  - Continue trazodone  200mg QHS for insomnia  - continue atarax 50mg q6h PRN for anxiety    Case discussed with Dr. Smith on August 4, 2022. Patient is medically stable and cleared for discharge to rehab . Will add lopressor on discharge

## 2022-07-28 NOTE — DISCHARGE NOTE PROVIDER - NSDCMRMEDTOKEN_GEN_ALL_CORE_FT
chlordiazePOXIDE 10 mg oral capsule: 3 cap(s) orally 2 times a day MDD:6 capsules (60mg)  mirtazapine 15 mg oral tablet: 1 tab(s) orally once a day (at bedtime)  Nicorette White Ice Mint 4 mg oral transmucosal gum: 1 gum chewed 10 times a day, As Needed   traZODone 50 mg oral tablet: 1 tab(s) orally once a day (at bedtime), As needed, Insomnia   chlordiazePOXIDE 10 mg oral capsule: 3 cap(s) orally 2 times a day MDD:6 capsules (60mg)  hydrOXYzine hydrochloride 50 mg oral tablet: 1 tab(s) orally every 8 hours, As needed, Anxiety  mirtazapine 15 mg oral tablet: 1 tab(s) orally once a day (at bedtime)  mirtazapine 30 mg oral tablet: 1 tab(s) orally once a day (at bedtime)  Nicorette White Ice Mint 4 mg oral transmucosal gum: 1 gum chewed 10 times a day, As Needed   traZODone 100 mg oral tablet: 2 tab(s) orally once a day (at bedtime)  traZODone 50 mg oral tablet: 1 tab(s) orally once a day (at bedtime), As needed, Insomnia   hydrOXYzine hydrochloride 50 mg oral tablet: 1 tab(s) orally every 8 hours, As needed, Anxiety  lidocaine 4% topical film: Apply topically to affected area once a day  mirtazapine 30 mg oral tablet: 1 tab(s) orally once a day (at bedtime)  Nicorette White Ice Mint 4 mg oral transmucosal gum: 1 gum chewed 10 times a day, As Needed   traZODone 100 mg oral tablet: 2 tab(s) orally once a day (at bedtime)   hydrOXYzine hydrochloride 50 mg oral tablet: 1 tab(s) orally every 8 hours, As needed, Anxiety  lidocaine 4% topical film: Apply topically to affected area once a day  metoprolol tartrate 25 mg oral tablet: 0.5 tab(s) orally 2 times a day for 4 days . Please wean off once patient more ambulatory   mirtazapine 30 mg oral tablet: 1 tab(s) orally once a day (at bedtime)  Nicorette White Ice Mint 4 mg oral transmucosal gum: 1 gum chewed 10 times a day, As Needed   traZODone 100 mg oral tablet: 2 tab(s) orally once a day (at bedtime)   hydrOXYzine hydrochloride 50 mg oral tablet: 1 tab(s) orally every 8 hours, As needed, Anxiety  lidocaine 4% topical film: Apply topically to affected area once a day  metoprolol tartrate 25 mg oral tablet: 0.5 tab(s) orally 2 times a day for 4 days . Please wean off once patient more ambulatory   metoprolol tartrate 25 mg oral tablet: 0.5 tab(s) orally 2 times a day for 4 days . Please wean off once patient more ambulatory   mirtazapine 30 mg oral tablet: 1 tab(s) orally once a day (at bedtime)  Nicorette White Ice Mint 4 mg oral transmucosal gum: 1 gum chewed 10 times a day, As Needed   traZODone 100 mg oral tablet: 2 tab(s) orally once a day (at bedtime)

## 2022-07-28 NOTE — DISCHARGE NOTE PROVIDER - PROVIDER TOKENS
FREE:[LAST:[Primary Care Doctor],PHONE:[(   )    -],FAX:[(   )    -]] FREE:[LAST:[Primary Care Doctor],PHONE:[(   )    -],FAX:[(   )    -]],PROVIDER:[TOKEN:[[id number]]]

## 2022-07-29 LAB
ANION GAP SERPL CALC-SCNC: 9 MMOL/L — SIGNIFICANT CHANGE UP (ref 7–14)
BASOPHILS # BLD AUTO: 0.06 K/UL — SIGNIFICANT CHANGE UP (ref 0–0.2)
BASOPHILS NFR BLD AUTO: 0.8 % — SIGNIFICANT CHANGE UP (ref 0–2)
BUN SERPL-MCNC: 13 MG/DL — SIGNIFICANT CHANGE UP (ref 7–23)
CALCIUM SERPL-MCNC: 9.6 MG/DL — SIGNIFICANT CHANGE UP (ref 8.4–10.5)
CHLORIDE SERPL-SCNC: 97 MMOL/L — LOW (ref 98–107)
CO2 SERPL-SCNC: 27 MMOL/L — SIGNIFICANT CHANGE UP (ref 22–31)
CREAT SERPL-MCNC: 0.88 MG/DL — SIGNIFICANT CHANGE UP (ref 0.5–1.3)
CULTURE RESULTS: SIGNIFICANT CHANGE UP
CULTURE RESULTS: SIGNIFICANT CHANGE UP
EGFR: 116 ML/MIN/1.73M2 — SIGNIFICANT CHANGE UP
EOSINOPHIL # BLD AUTO: 0.33 K/UL — SIGNIFICANT CHANGE UP (ref 0–0.5)
EOSINOPHIL NFR BLD AUTO: 4.5 % — SIGNIFICANT CHANGE UP (ref 0–6)
GLUCOSE SERPL-MCNC: 78 MG/DL — SIGNIFICANT CHANGE UP (ref 70–99)
HCT VFR BLD CALC: 41.1 % — SIGNIFICANT CHANGE UP (ref 39–50)
HGB BLD-MCNC: 13.3 G/DL — SIGNIFICANT CHANGE UP (ref 13–17)
IANC: 3.69 K/UL — SIGNIFICANT CHANGE UP (ref 1.8–7.4)
IMM GRANULOCYTES NFR BLD AUTO: 0.3 % — SIGNIFICANT CHANGE UP (ref 0–1.5)
LYMPHOCYTES # BLD AUTO: 2.6 K/UL — SIGNIFICANT CHANGE UP (ref 1–3.3)
LYMPHOCYTES # BLD AUTO: 35.5 % — SIGNIFICANT CHANGE UP (ref 13–44)
MAGNESIUM SERPL-MCNC: 1.9 MG/DL — SIGNIFICANT CHANGE UP (ref 1.6–2.6)
MCHC RBC-ENTMCNC: 30.9 PG — SIGNIFICANT CHANGE UP (ref 27–34)
MCHC RBC-ENTMCNC: 32.4 GM/DL — SIGNIFICANT CHANGE UP (ref 32–36)
MCV RBC AUTO: 95.6 FL — SIGNIFICANT CHANGE UP (ref 80–100)
MONOCYTES # BLD AUTO: 0.63 K/UL — SIGNIFICANT CHANGE UP (ref 0–0.9)
MONOCYTES NFR BLD AUTO: 8.6 % — SIGNIFICANT CHANGE UP (ref 2–14)
NEUTROPHILS # BLD AUTO: 3.69 K/UL — SIGNIFICANT CHANGE UP (ref 1.8–7.4)
NEUTROPHILS NFR BLD AUTO: 50.3 % — SIGNIFICANT CHANGE UP (ref 43–77)
NRBC # BLD: 0 /100 WBCS — SIGNIFICANT CHANGE UP
NRBC # FLD: 0 K/UL — SIGNIFICANT CHANGE UP
PHOSPHATE SERPL-MCNC: 4.3 MG/DL — SIGNIFICANT CHANGE UP (ref 2.5–4.5)
PLATELET # BLD AUTO: 281 K/UL — SIGNIFICANT CHANGE UP (ref 150–400)
POTASSIUM SERPL-MCNC: 3.7 MMOL/L — SIGNIFICANT CHANGE UP (ref 3.5–5.3)
POTASSIUM SERPL-SCNC: 3.7 MMOL/L — SIGNIFICANT CHANGE UP (ref 3.5–5.3)
RBC # BLD: 4.3 M/UL — SIGNIFICANT CHANGE UP (ref 4.2–5.8)
RBC # FLD: 12.7 % — SIGNIFICANT CHANGE UP (ref 10.3–14.5)
SODIUM SERPL-SCNC: 133 MMOL/L — LOW (ref 135–145)
SPECIMEN SOURCE: SIGNIFICANT CHANGE UP
SPECIMEN SOURCE: SIGNIFICANT CHANGE UP
WBC # BLD: 7.33 K/UL — SIGNIFICANT CHANGE UP (ref 3.8–10.5)
WBC # FLD AUTO: 7.33 K/UL — SIGNIFICANT CHANGE UP (ref 3.8–10.5)

## 2022-07-29 PROCEDURE — 99233 SBSQ HOSP IP/OBS HIGH 50: CPT

## 2022-07-29 RX ORDER — TRAZODONE HCL 50 MG
50 TABLET ORAL AT BEDTIME
Refills: 0 | Status: DISCONTINUED | OUTPATIENT
Start: 2022-07-29 | End: 2022-08-01

## 2022-07-29 RX ORDER — MIRTAZAPINE 45 MG/1
30 TABLET, ORALLY DISINTEGRATING ORAL AT BEDTIME
Refills: 0 | Status: DISCONTINUED | OUTPATIENT
Start: 2022-07-29 | End: 2022-08-04

## 2022-07-29 RX ORDER — LANOLIN ALCOHOL/MO/W.PET/CERES
6 CREAM (GRAM) TOPICAL ONCE
Refills: 0 | Status: COMPLETED | OUTPATIENT
Start: 2022-07-29 | End: 2022-07-29

## 2022-07-29 RX ORDER — LIDOCAINE 4 G/100G
1 CREAM TOPICAL ONCE
Refills: 0 | Status: COMPLETED | OUTPATIENT
Start: 2022-07-29 | End: 2022-07-29

## 2022-07-29 RX ADMIN — Medication 2 MILLIGRAM(S): at 08:22

## 2022-07-29 RX ADMIN — LIDOCAINE 1 PATCH: 4 CREAM TOPICAL at 12:34

## 2022-07-29 RX ADMIN — Medication 2 MILLIGRAM(S): at 00:00

## 2022-07-29 RX ADMIN — Medication 2 MILLIGRAM(S): at 03:54

## 2022-07-29 RX ADMIN — Medication 6 MILLIGRAM(S): at 00:58

## 2022-07-29 RX ADMIN — Medication 50 MILLIGRAM(S): at 19:19

## 2022-07-29 RX ADMIN — Medication 6 MILLIGRAM(S): at 22:43

## 2022-07-29 RX ADMIN — Medication 150 MILLIGRAM(S): at 22:43

## 2022-07-29 RX ADMIN — LIDOCAINE 1 PATCH: 4 CREAM TOPICAL at 19:18

## 2022-07-29 RX ADMIN — MIRTAZAPINE 30 MILLIGRAM(S): 45 TABLET, ORALLY DISINTEGRATING ORAL at 22:43

## 2022-07-29 RX ADMIN — LIDOCAINE 1 PATCH: 4 CREAM TOPICAL at 16:02

## 2022-07-29 NOTE — BH CONSULTATION LIAISON PROGRESS NOTE - NSBHCONSULTFOLLOWAFTERCARE_PSY_A_CORE FT
Recommend inpatient rehab for substance use treatment Recommend inpatient rehab for substance use treatment  Additional referrals:  [facility] Substance Abuse Outpatient Program ([facility]): [phone]  [facility] outpt. walk in clinic : [phone]

## 2022-07-29 NOTE — BH CONSULTATION LIAISON PROGRESS NOTE - NSBHFUPINTERVALHXFT_PSY_A_CORE
Patient seen for follow up for withdrawal management. Chart reviewed. No acute events overnight. Primary team, patient has not been sleeping well, requesting to see psychiatry. Discussed case with comprehensive team, including attending and SW. Patient is still awaiting placement at inpatient rehab. Today, discussed patient's treatment plan with patient, his mother, SW, and primary team attending in order to facilitate coordination of care. Patient is c/o insomnia, states it is difficult to go to sleep. Also is being woken up at 5am for labs which is difficult for him. States that atarax is helping him with anxiety and restlessness. Does not want to try benadryl PRN as he finds it activating. Patient does not express any other psychiatric concerns. Mother wants patient to be linked to inpatient rehab prior to discharge. Per SW, referrals are being made.    Patient seen for follow up for withdrawal management. Chart reviewed. No acute events overnight. per primary team, patient has not been sleeping well, requesting to see psychiatry. Discussed case with comprehensive team, including medical attending and SW. Patient is still awaiting placement at inpatient substance abuse rehab. Today, discussed patient's treatment plan with patient, his mother (with patient's consent), SW, and primary team attending in order to facilitate coordination of care. Patient is c/o insomnia, states it is difficult to go to sleep. As per patient: also is being woken up at 5am for labs which is difficult for him. States that atarax is helping him with anxiety and restlessness. Does not want to try benadryl PRN as he finds it activating. Patient does not express any other psychiatric concerns. No psychosis elicited, denies SIIP, denies HIIP. Mother wants patient to be linked to inpatient rehab prior to discharge. Per SW, referrals are being made.

## 2022-07-29 NOTE — BH CONSULTATION LIAISON PROGRESS NOTE - NSBHASSESSMENTFT_PSY_ALL_CORE
The patient is a 32-year-old man with a history of polysubstance use disorder presenting to [facility] after syncope vs cardiac arrest. Patient was recently discharged from [facility] on 7/22. He presented to [facility] with symptoms of psychosis and lito in the context of substance use (THC, Percocet, possibly Suboxone, possibly Xanax) and lack of outpatient treatment. He was treated for suspected benzo w/d delirium, discharged on Librium 30mg BID with plan to taper outpatient. Pt now readmitted s/p Narcan resuscitation today. Psychiatry was consulted for med recs and management of withdrawal. On eval, patient is calm, cooperative. Not exhibiting signs and symptoms of withdrawal at this time other than abdominal pain (patient also with abdominal distention on CTAP). No SI, HI. No AVH. No delusions.    Clinical update 7/29: Psychiatry called for concerns about pt's sleep. Patient initially with improved sleep, now with insomnia. Will adjust sleep medications. No safety concerns. Patient is psychiatrically cleared- does not meet criteria for inpatient psych admission. Agree with patient/family, primary team with referral to inpatient rehab for substance use treatment.    Plan:   - INCREASE remeron to 30mg QHS for insomania  - continue trazodone to 150mg QHS for insomnia  - START trazodone 50mg QHS PRN for insomnia  - STOP benadryl 50mg PO QHS PRN for insomnia - patient does not want  - continue atarax 50mg q8h PRN for anxiety  - avoid one time benzodiazepine doses, encourage alternative medications for anxiety and insomnia - please sign out to night team  - continue coordination with sbirt for inpatient rehab referral  - patient is psychiatrically cleared, no evidence of SI/HI, does not meet criteria for inpatient psych care. defer disposition to primary team and SW.   - psych will follow The patient is a 32-year-old man with a history of polysubstance use disorder presenting to [facility] after syncope vs cardiac arrest. Patient was recently discharged from [facility] on 7/22. He presented to [facility] with symptoms of psychosis and lito in the context of substance use (THC, Percocet, possibly Suboxone, possibly Xanax) and lack of outpatient treatment. He was treated for suspected benzo w/d delirium, discharged on Librium 30mg BID with plan to taper outpatient. Pt now readmitted s/p Narcan resuscitation today. Psychiatry was consulted for med recs and management of withdrawal. On eval, patient is calm, cooperative. Not exhibiting signs and symptoms of withdrawal at this time other than abdominal pain (patient also with abdominal distention on CTAP). No SI, HI. No AVH. No delusions.    Clinical update 7/29: Psychiatry called for concerns about pt's sleep. Patient initially with improved sleep, now with insomnia. Will adjust sleep medications. No acute psychiatric safety concerns. No psychiatric contraindications to discharge- does not meet criteria for inpatient psych admission at this time. Agree with patient/family, primary team with referral to inpatient rehab for substance use treatment.    Plan:   - INCREASE remeron to 30mg QHS for insomnia (pt amenable)  - continue trazodone to 150mg QHS for insomnia  - START trazodone 50mg QHS PRN for insomnia  - STOP benadryl 50mg PO QHS PRN for insomnia - patient does not want  - continue atarax 50mg q6h PRN for anxiety  - avoid one time benzodiazepine doses, encourage alternative medications for anxiety and insomnia - please sign out to night team  - continue coordination with sbirt/SW  for inpatient rehab referral  - Patient is hopeful, treatment seeking, future oriented, amenable to inpatient substance abuse program, no evidence of SI/HI, not at acute risk of harm to self or others, does not meet criteria for inpatient psych care.   Primary team/ SW sending referrals to  inpatient substance abuse programs. Psych will follow, please don't hesitate to call for any further questions or concerns. Please let psychiatry C/L team know if we can be of any further assistance to facilitate a smooth disposition.

## 2022-07-30 LAB
ANION GAP SERPL CALC-SCNC: 9 MMOL/L — SIGNIFICANT CHANGE UP (ref 7–14)
BASOPHILS # BLD AUTO: 0.06 K/UL — SIGNIFICANT CHANGE UP (ref 0–0.2)
BASOPHILS NFR BLD AUTO: 1.1 % — SIGNIFICANT CHANGE UP (ref 0–2)
BUN SERPL-MCNC: 12 MG/DL — SIGNIFICANT CHANGE UP (ref 7–23)
CALCIUM SERPL-MCNC: 9.6 MG/DL — SIGNIFICANT CHANGE UP (ref 8.4–10.5)
CHLORIDE SERPL-SCNC: 102 MMOL/L — SIGNIFICANT CHANGE UP (ref 98–107)
CO2 SERPL-SCNC: 27 MMOL/L — SIGNIFICANT CHANGE UP (ref 22–31)
CREAT SERPL-MCNC: 0.88 MG/DL — SIGNIFICANT CHANGE UP (ref 0.5–1.3)
EGFR: 116 ML/MIN/1.73M2 — SIGNIFICANT CHANGE UP
EOSINOPHIL # BLD AUTO: 0.32 K/UL — SIGNIFICANT CHANGE UP (ref 0–0.5)
EOSINOPHIL NFR BLD AUTO: 5.9 % — SIGNIFICANT CHANGE UP (ref 0–6)
GLUCOSE SERPL-MCNC: 131 MG/DL — HIGH (ref 70–99)
HCT VFR BLD CALC: 39.8 % — SIGNIFICANT CHANGE UP (ref 39–50)
HGB BLD-MCNC: 13.3 G/DL — SIGNIFICANT CHANGE UP (ref 13–17)
IANC: 3.67 K/UL — SIGNIFICANT CHANGE UP (ref 1.8–7.4)
IMM GRANULOCYTES NFR BLD AUTO: 0.4 % — SIGNIFICANT CHANGE UP (ref 0–1.5)
LYMPHOCYTES # BLD AUTO: 1.07 K/UL — SIGNIFICANT CHANGE UP (ref 1–3.3)
LYMPHOCYTES # BLD AUTO: 19.7 % — SIGNIFICANT CHANGE UP (ref 13–44)
MAGNESIUM SERPL-MCNC: 1.9 MG/DL — SIGNIFICANT CHANGE UP (ref 1.6–2.6)
MCHC RBC-ENTMCNC: 30.7 PG — SIGNIFICANT CHANGE UP (ref 27–34)
MCHC RBC-ENTMCNC: 33.4 GM/DL — SIGNIFICANT CHANGE UP (ref 32–36)
MCV RBC AUTO: 91.9 FL — SIGNIFICANT CHANGE UP (ref 80–100)
MONOCYTES # BLD AUTO: 0.29 K/UL — SIGNIFICANT CHANGE UP (ref 0–0.9)
MONOCYTES NFR BLD AUTO: 5.3 % — SIGNIFICANT CHANGE UP (ref 2–14)
NEUTROPHILS # BLD AUTO: 3.67 K/UL — SIGNIFICANT CHANGE UP (ref 1.8–7.4)
NEUTROPHILS NFR BLD AUTO: 67.6 % — SIGNIFICANT CHANGE UP (ref 43–77)
NRBC # BLD: 0 /100 WBCS — SIGNIFICANT CHANGE UP
NRBC # FLD: 0 K/UL — SIGNIFICANT CHANGE UP
PHOSPHATE SERPL-MCNC: 3.9 MG/DL — SIGNIFICANT CHANGE UP (ref 2.5–4.5)
PLATELET # BLD AUTO: 258 K/UL — SIGNIFICANT CHANGE UP (ref 150–400)
POTASSIUM SERPL-MCNC: 4.1 MMOL/L — SIGNIFICANT CHANGE UP (ref 3.5–5.3)
POTASSIUM SERPL-SCNC: 4.1 MMOL/L — SIGNIFICANT CHANGE UP (ref 3.5–5.3)
RBC # BLD: 4.33 M/UL — SIGNIFICANT CHANGE UP (ref 4.2–5.8)
RBC # FLD: 12.7 % — SIGNIFICANT CHANGE UP (ref 10.3–14.5)
SARS-COV-2 RNA SPEC QL NAA+PROBE: SIGNIFICANT CHANGE UP
SODIUM SERPL-SCNC: 138 MMOL/L — SIGNIFICANT CHANGE UP (ref 135–145)
WBC # BLD: 5.43 K/UL — SIGNIFICANT CHANGE UP (ref 3.8–10.5)
WBC # FLD AUTO: 5.43 K/UL — SIGNIFICANT CHANGE UP (ref 3.8–10.5)

## 2022-07-30 RX ADMIN — Medication 150 MILLIGRAM(S): at 23:27

## 2022-07-30 RX ADMIN — Medication 6 MILLIGRAM(S): at 23:27

## 2022-07-30 RX ADMIN — LIDOCAINE 1 PATCH: 4 CREAM TOPICAL at 04:00

## 2022-07-30 RX ADMIN — LIDOCAINE 1 PATCH: 4 CREAM TOPICAL at 00:00

## 2022-07-30 RX ADMIN — MIRTAZAPINE 30 MILLIGRAM(S): 45 TABLET, ORALLY DISINTEGRATING ORAL at 23:27

## 2022-07-30 NOTE — BH CONSULTATION LIAISON PROGRESS NOTE - NSBHCONSULTFOLLOWAFTERCARE_PSY_A_CORE FT
Recommend inpatient rehab for substance use treatment  Additional referrals:  [facility] Substance Abuse Outpatient Program ([facility]): [phone]  [facility] outpt. walk in clinic : [phone]

## 2022-07-30 NOTE — BH CONSULTATION LIAISON PROGRESS NOTE - ATTENDING COMMENTS
patient's case discussed with resident, no si/hi noted, still cleared for transfer to in drug abuse rehab

## 2022-07-30 NOTE — BH CONSULTATION LIAISON PROGRESS NOTE - NSBHFUPINTERVALHXFT_PSY_A_CORE
Patient seen for follow up for withdrawal management. Chart reviewed. Pt had a COVID-19 exposure and was transferred to a single room given exposure, endorses feeling frustrated by this exposure. Pt states he is feeling better this morning, slept 6 hours last night, denies withdrawal symptoms and cravings. Mood is "good", denies SI/HI

## 2022-07-30 NOTE — BH CONSULTATION LIAISON PROGRESS NOTE - NSBHASSESSMENTFT_PSY_ALL_CORE
The patient is a 32-year-old man with a history of polysubstance use disorder presenting to [facility] after syncope vs cardiac arrest. Patient was recently discharged from [facility] L3 on 7/22. He presented to [facility] with symptoms of psychosis and lito in the context of substance use (THC, Percocet, possibly Suboxone, possibly Xanax) and lack of outpatient treatment. He was treated for suspected benzo w/d delirium, discharged on Librium 30mg BID with plan to taper outpatient. Pt now readmitted s/p Narcan resuscitation today. Psychiatry was consulted for med recs and management of withdrawal. On eval, patient is calm, cooperative. Not exhibiting signs and symptoms of withdrawal at this time other than abdominal pain (patient also with abdominal distention on CTAP). No SI, HI. No AVH. No delusions.    Clinical update 7/29: Psychiatry called for concerns about pt's sleep. Patient initially with improved sleep, now with insomnia. Will adjust sleep medications. No acute psychiatric safety concerns. No psychiatric contraindications to discharge- does not meet criteria for inpatient psych admission at this time. Agree with patient/family, primary team with referral to inpatient rehab for substance use treatment.    7/30: Pt endorsing good sleep overnight with increased dose of Remeron, did not require trazodone prn in addition to standing trazodone  Plan:   - Continue 30mg QHS for insomnia  - continue trazodone to 150mg QHS for insomnia  - Can continue with trazodone 50mg QHS PRN for insomnia if unable to sleep after standing sleep meds  - continue atarax 50mg q6h PRN for anxiety  - avoid one time benzodiazepine doses, encourage alternative medications for anxiety and insomnia - please sign out to night team  - continue coordination with sbirt/SW  for inpatient rehab referral  - Patient is hopeful, treatment seeking, future oriented, amenable to inpatient substance abuse program, no evidence of SI/HI, not at acute risk of harm to self or others, does not meet criteria for inpatient psych care.   Primary team/ SW sending referrals to  inpatient substance abuse programs. Psych will follow, please don't hesitate to call for any further questions or concerns. Please let psychiatry C/L team know if we can be of any further assistance to facilitate a smooth disposition.

## 2022-07-31 LAB
ANION GAP SERPL CALC-SCNC: 12 MMOL/L — SIGNIFICANT CHANGE UP (ref 7–14)
BASOPHILS # BLD AUTO: 0.04 K/UL — SIGNIFICANT CHANGE UP (ref 0–0.2)
BASOPHILS NFR BLD AUTO: 0.5 % — SIGNIFICANT CHANGE UP (ref 0–2)
BUN SERPL-MCNC: 16 MG/DL — SIGNIFICANT CHANGE UP (ref 7–23)
CALCIUM SERPL-MCNC: 10.1 MG/DL — SIGNIFICANT CHANGE UP (ref 8.4–10.5)
CHLORIDE SERPL-SCNC: 98 MMOL/L — SIGNIFICANT CHANGE UP (ref 98–107)
CO2 SERPL-SCNC: 28 MMOL/L — SIGNIFICANT CHANGE UP (ref 22–31)
CREAT SERPL-MCNC: 1.11 MG/DL — SIGNIFICANT CHANGE UP (ref 0.5–1.3)
EGFR: 90 ML/MIN/1.73M2 — SIGNIFICANT CHANGE UP
EOSINOPHIL # BLD AUTO: 0.09 K/UL — SIGNIFICANT CHANGE UP (ref 0–0.5)
EOSINOPHIL NFR BLD AUTO: 1.2 % — SIGNIFICANT CHANGE UP (ref 0–6)
GLUCOSE SERPL-MCNC: 124 MG/DL — HIGH (ref 70–99)
HCT VFR BLD CALC: 42.1 % — SIGNIFICANT CHANGE UP (ref 39–50)
HGB BLD-MCNC: 14.3 G/DL — SIGNIFICANT CHANGE UP (ref 13–17)
IANC: 5.66 K/UL — SIGNIFICANT CHANGE UP (ref 1.8–7.4)
IMM GRANULOCYTES NFR BLD AUTO: 0.4 % — SIGNIFICANT CHANGE UP (ref 0–1.5)
LYMPHOCYTES # BLD AUTO: 1.13 K/UL — SIGNIFICANT CHANGE UP (ref 1–3.3)
LYMPHOCYTES # BLD AUTO: 15.3 % — SIGNIFICANT CHANGE UP (ref 13–44)
MAGNESIUM SERPL-MCNC: 1.9 MG/DL — SIGNIFICANT CHANGE UP (ref 1.6–2.6)
MCHC RBC-ENTMCNC: 31.1 PG — SIGNIFICANT CHANGE UP (ref 27–34)
MCHC RBC-ENTMCNC: 34 GM/DL — SIGNIFICANT CHANGE UP (ref 32–36)
MCV RBC AUTO: 91.5 FL — SIGNIFICANT CHANGE UP (ref 80–100)
MONOCYTES # BLD AUTO: 0.44 K/UL — SIGNIFICANT CHANGE UP (ref 0–0.9)
MONOCYTES NFR BLD AUTO: 6 % — SIGNIFICANT CHANGE UP (ref 2–14)
NEUTROPHILS # BLD AUTO: 5.66 K/UL — SIGNIFICANT CHANGE UP (ref 1.8–7.4)
NEUTROPHILS NFR BLD AUTO: 76.6 % — SIGNIFICANT CHANGE UP (ref 43–77)
NRBC # BLD: 0 /100 WBCS — SIGNIFICANT CHANGE UP
NRBC # FLD: 0 K/UL — SIGNIFICANT CHANGE UP
PHOSPHATE SERPL-MCNC: 2.8 MG/DL — SIGNIFICANT CHANGE UP (ref 2.5–4.5)
PLATELET # BLD AUTO: 327 K/UL — SIGNIFICANT CHANGE UP (ref 150–400)
POTASSIUM SERPL-MCNC: 3.8 MMOL/L — SIGNIFICANT CHANGE UP (ref 3.5–5.3)
POTASSIUM SERPL-SCNC: 3.8 MMOL/L — SIGNIFICANT CHANGE UP (ref 3.5–5.3)
RBC # BLD: 4.6 M/UL — SIGNIFICANT CHANGE UP (ref 4.2–5.8)
RBC # FLD: 12.5 % — SIGNIFICANT CHANGE UP (ref 10.3–14.5)
SARS-COV-2 RNA SPEC QL NAA+PROBE: SIGNIFICANT CHANGE UP
SODIUM SERPL-SCNC: 138 MMOL/L — SIGNIFICANT CHANGE UP (ref 135–145)
WBC # BLD: 7.39 K/UL — SIGNIFICANT CHANGE UP (ref 3.8–10.5)
WBC # FLD AUTO: 7.39 K/UL — SIGNIFICANT CHANGE UP (ref 3.8–10.5)

## 2022-07-31 RX ADMIN — Medication 2 MILLIGRAM(S): at 16:09

## 2022-07-31 RX ADMIN — Medication 50 MILLIGRAM(S): at 03:31

## 2022-07-31 RX ADMIN — Medication 6 MILLIGRAM(S): at 22:33

## 2022-07-31 RX ADMIN — Medication 2 MILLIGRAM(S): at 12:09

## 2022-07-31 RX ADMIN — Medication 2 MILLIGRAM(S): at 21:31

## 2022-07-31 RX ADMIN — LIDOCAINE 1 PATCH: 4 CREAM TOPICAL at 12:25

## 2022-07-31 RX ADMIN — LIDOCAINE 1 PATCH: 4 CREAM TOPICAL at 19:20

## 2022-07-31 RX ADMIN — Medication 2 MILLIGRAM(S): at 03:32

## 2022-07-31 RX ADMIN — Medication 150 MILLIGRAM(S): at 22:35

## 2022-07-31 RX ADMIN — MIRTAZAPINE 30 MILLIGRAM(S): 45 TABLET, ORALLY DISINTEGRATING ORAL at 22:33

## 2022-08-01 LAB
ANION GAP SERPL CALC-SCNC: 9 MMOL/L — SIGNIFICANT CHANGE UP (ref 7–14)
BASOPHILS # BLD AUTO: 0.04 K/UL — SIGNIFICANT CHANGE UP (ref 0–0.2)
BASOPHILS NFR BLD AUTO: 0.9 % — SIGNIFICANT CHANGE UP (ref 0–2)
BUN SERPL-MCNC: 15 MG/DL — SIGNIFICANT CHANGE UP (ref 7–23)
CALCIUM SERPL-MCNC: 9.7 MG/DL — SIGNIFICANT CHANGE UP (ref 8.4–10.5)
CHLORIDE SERPL-SCNC: 101 MMOL/L — SIGNIFICANT CHANGE UP (ref 98–107)
CO2 SERPL-SCNC: 27 MMOL/L — SIGNIFICANT CHANGE UP (ref 22–31)
CREAT SERPL-MCNC: 0.92 MG/DL — SIGNIFICANT CHANGE UP (ref 0.5–1.3)
EGFR: 113 ML/MIN/1.73M2 — SIGNIFICANT CHANGE UP
EOSINOPHIL # BLD AUTO: 0.29 K/UL — SIGNIFICANT CHANGE UP (ref 0–0.5)
EOSINOPHIL NFR BLD AUTO: 6.8 % — HIGH (ref 0–6)
GLUCOSE SERPL-MCNC: 96 MG/DL — SIGNIFICANT CHANGE UP (ref 70–99)
HCT VFR BLD CALC: 41.1 % — SIGNIFICANT CHANGE UP (ref 39–50)
HGB BLD-MCNC: 14.1 G/DL — SIGNIFICANT CHANGE UP (ref 13–17)
IANC: 2.34 K/UL — SIGNIFICANT CHANGE UP (ref 1.8–7.4)
IMM GRANULOCYTES NFR BLD AUTO: 0.2 % — SIGNIFICANT CHANGE UP (ref 0–1.5)
LYMPHOCYTES # BLD AUTO: 1.27 K/UL — SIGNIFICANT CHANGE UP (ref 1–3.3)
LYMPHOCYTES # BLD AUTO: 29.9 % — SIGNIFICANT CHANGE UP (ref 13–44)
MAGNESIUM SERPL-MCNC: 2 MG/DL — SIGNIFICANT CHANGE UP (ref 1.6–2.6)
MCHC RBC-ENTMCNC: 31.5 PG — SIGNIFICANT CHANGE UP (ref 27–34)
MCHC RBC-ENTMCNC: 34.3 GM/DL — SIGNIFICANT CHANGE UP (ref 32–36)
MCV RBC AUTO: 91.7 FL — SIGNIFICANT CHANGE UP (ref 80–100)
MONOCYTES # BLD AUTO: 0.3 K/UL — SIGNIFICANT CHANGE UP (ref 0–0.9)
MONOCYTES NFR BLD AUTO: 7.1 % — SIGNIFICANT CHANGE UP (ref 2–14)
NEUTROPHILS # BLD AUTO: 2.34 K/UL — SIGNIFICANT CHANGE UP (ref 1.8–7.4)
NEUTROPHILS NFR BLD AUTO: 55.1 % — SIGNIFICANT CHANGE UP (ref 43–77)
NRBC # BLD: 0 /100 WBCS — SIGNIFICANT CHANGE UP
NRBC # FLD: 0 K/UL — SIGNIFICANT CHANGE UP
PHOSPHATE SERPL-MCNC: 2.6 MG/DL — SIGNIFICANT CHANGE UP (ref 2.5–4.5)
PLATELET # BLD AUTO: 286 K/UL — SIGNIFICANT CHANGE UP (ref 150–400)
POTASSIUM SERPL-MCNC: 4.4 MMOL/L — SIGNIFICANT CHANGE UP (ref 3.5–5.3)
POTASSIUM SERPL-SCNC: 4.4 MMOL/L — SIGNIFICANT CHANGE UP (ref 3.5–5.3)
RBC # BLD: 4.48 M/UL — SIGNIFICANT CHANGE UP (ref 4.2–5.8)
RBC # FLD: 12.7 % — SIGNIFICANT CHANGE UP (ref 10.3–14.5)
SODIUM SERPL-SCNC: 137 MMOL/L — SIGNIFICANT CHANGE UP (ref 135–145)
WBC # BLD: 4.25 K/UL — SIGNIFICANT CHANGE UP (ref 3.8–10.5)
WBC # FLD AUTO: 4.25 K/UL — SIGNIFICANT CHANGE UP (ref 3.8–10.5)

## 2022-08-01 PROCEDURE — 99232 SBSQ HOSP IP/OBS MODERATE 35: CPT

## 2022-08-01 RX ORDER — TRAZODONE HCL 50 MG
200 TABLET ORAL AT BEDTIME
Refills: 0 | Status: DISCONTINUED | OUTPATIENT
Start: 2022-08-01 | End: 2022-08-04

## 2022-08-01 RX ADMIN — Medication 2 MILLIGRAM(S): at 16:15

## 2022-08-01 RX ADMIN — LIDOCAINE 1 PATCH: 4 CREAM TOPICAL at 00:06

## 2022-08-01 RX ADMIN — Medication 2 MILLIGRAM(S): at 12:15

## 2022-08-01 RX ADMIN — Medication 6 MILLIGRAM(S): at 23:56

## 2022-08-01 RX ADMIN — Medication 2 MILLIGRAM(S): at 02:09

## 2022-08-01 RX ADMIN — Medication 2 MILLIGRAM(S): at 21:42

## 2022-08-01 RX ADMIN — Medication 200 MILLIGRAM(S): at 23:58

## 2022-08-01 RX ADMIN — MIRTAZAPINE 30 MILLIGRAM(S): 45 TABLET, ORALLY DISINTEGRATING ORAL at 23:56

## 2022-08-01 NOTE — DIETITIAN INITIAL EVALUATION ADULT - ADD RECOMMEND
1. Continue current diet order, which remains appropriate at this time. 2. c/w Remeron per MD order. 3. Monitor weights, labs, BM's, skin integrity, PO intake/tolerance. 4. Encourage po intake as tolerated, assist with meals and menu selections, provide alternatives PRN.

## 2022-08-01 NOTE — BH CONSULTATION LIAISON PROGRESS NOTE - NSBHFUPINTERVALCCFT_PSY_A_CORE
"I'm frustrated that i'm in isolation now"  No PRNs of trazodone taken over weekend, Has been using atarax PRN.   Per chart review, patient attempted to have xanax and suboxone brought into the hospital this weekend but it was confiscated by staff.  "I'm frustrated that i'm in isolation now"  No PRNs of trazodone taken over weekend, Has been using atarax PRN.

## 2022-08-01 NOTE — DIETITIAN INITIAL EVALUATION ADULT - PERTINENT LABORATORY DATA
08-01    137  |  101  |  15  ----------------------------<  96  4.4   |  27  |  0.92    Ca    9.7      01 Aug 2022 11:32  Phos  2.6     08-01  Mg     2.00     08-01    A1C with Estimated Average Glucose Result: 5.0 % (07-19-22 @ 08:52)

## 2022-08-01 NOTE — DIETITIAN INITIAL EVALUATION ADULT - ORAL INTAKE PTA/DIET HISTORY
Patient reports he has been eating fairly well prior to admission, denies decrease in appetite. NKFA reported, but dislikes seafoods and fish -- honored on CBoard.

## 2022-08-01 NOTE — BH CONSULTATION LIAISON PROGRESS NOTE - PRN MEDS
hydroxyzine 50mg yesterday evening around 7pm for anxiety 
See above
hydroxyzine 50mg  
See above
See above

## 2022-08-01 NOTE — BH CONSULTATION LIAISON PROGRESS NOTE - NSBHASSESSMENTFT_PSY_ALL_CORE
The patient is a 32-year-old man with a history of polysubstance use disorder presenting to [facility] after syncope vs cardiac arrest. Patient was recently discharged from [facility] L3 on 7/22. He presented to [facility] with symptoms of psychosis and lito in the context of substance use (THC, Percocet, possibly Suboxone, possibly Xanax) and lack of outpatient treatment. He was treated for suspected benzo w/d delirium, discharged on Librium 30mg BID with plan to taper outpatient. Pt now readmitted s/p Narcan resuscitation today. Psychiatry was consulted for med recs and management of withdrawal. On eval, patient is calm, cooperative. Not exhibiting signs and symptoms of withdrawal at this time other than abdominal pain (patient also with abdominal distention on CTAP). No SI, HI. No AVH. No delusions.    Clinical update 7/29: Psychiatry called for concerns about pt's sleep. Patient initially with improved sleep, now with insomnia. Will adjust sleep medications. No acute psychiatric safety concerns. No psychiatric contraindications to discharge- does not meet criteria for inpatient psych admission at this time. Agree with patient/family, primary team with referral to inpatient rehab for substance use treatment.    7/30: Pt endorsing good sleep overnight with increased dose of Remeron, did not require trazodone prn in addition to standing trazodone    8/1: Patient reporting terrible sleep all weekend, will increase standing trazodone dosage     Plan:   - Continue 30mg QHS for insomnia  - INCREASE trazodone to 200mg QHS for insomnia  - Can continue with trazodone 50mg QHS PRN for insomnia if unable to sleep after standing sleep meds  - continue atarax 50mg q6h PRN for anxiety  - avoid one time benzodiazepine doses, encourage alternative medications for anxiety and insomnia - please sign out to night team  - continue coordination with sbirt/SW  for inpatient rehab referral  - Patient is hopeful, treatment seeking, future oriented, amenable to inpatient substance abuse program, no evidence of SI/HI, not at acute risk of harm to self or others, does not meet criteria for inpatient psych care.   Primary team/ SW sending referrals to  inpatient substance abuse programs. Psych will follow, please don't hesitate to call for any further questions or concerns. Please let psychiatry C/L team know if we can be of any further assistance to facilitate a smooth disposition.  The patient is a 32-year-old man with a history of polysubstance use disorder presenting to [facility] after syncope vs cardiac arrest. Patient was recently discharged from [facility] L3 on 7/22. He presented to [facility] with symptoms of psychosis and lito in the context of substance use (THC, Percocet, possibly Suboxone, possibly Xanax) and lack of outpatient treatment. He was treated for suspected benzo w/d delirium, discharged on Librium 30mg BID with plan to taper outpatient. Pt now readmitted s/p Narcan resuscitation today. Psychiatry was consulted for med recs and management of withdrawal. On eval, patient is calm, cooperative. Not exhibiting signs and symptoms of withdrawal at this time other than abdominal pain (patient also with abdominal distention on CTAP). No SI, HI. No AVH. No delusions.    Clinical update 7/29: Psychiatry called for concerns about pt's sleep. Patient initially with improved sleep, now with insomnia. Will adjust sleep medications. No acute psychiatric safety concerns. No psychiatric contraindications to discharge- does not meet criteria for inpatient psych admission at this time. Agree with patient/family, primary team with referral to inpatient rehab for substance use treatment.    7/30: Pt endorsing good sleep overnight with increased dose of Remeron, did not require trazodone prn in addition to standing trazodone    8/1: Patient reporting poor sleep all weekend, will increase standing trazodone dosage . Denies SI and HI, amenable to be transfer to inpatient substance abuse program.    Plan:   - Continue Remeron 30mg QHS for insomnia/mood  - INCREASE trazodone to 200mg QHS for insomnia  - continue atarax 50mg q6h PRN for anxiety  - avoid one time benzodiazepine doses, encourage alternative medications for anxiety and insomnia - please sign out to night team  - continue coordination with sbirt/SW  for inpatient rehab referral  - Patient is hopeful, treatment seeking, future oriented, amenable to inpatient substance abuse program, no evidence of SI/HI, not at acute risk of harm to self or others, does not meet criteria for inpatient psych care.   Primary team/ SW sending referrals to  inpatient substance abuse programs. Psych will follow, please don't hesitate to call for any further questions or concerns. Please let psychiatry C/L team know if we can be of any further assistance to facilitate a smooth disposition.

## 2022-08-01 NOTE — DIETITIAN INITIAL EVALUATION ADULT - OTHER INFO
Per chart, 32M with hx of 32M with hx of polysubstance abuse (Percocet, Xanax, Marijuana), recent discharge from [facility] after stay for psychosis and lito in the context of substance abuse, who presents after ?syncope vs. arrest, who presents after ?syncope vs. arrest. s/p CPR by bystanders, and narcan with EMS. In isolation 2/2 Covid exposure.     Patient reports current appetite remains good at this time, po intake mostly 75% at meals reported, and PO ~90% at breakfast this AM per plate waste observation. Of note, surgery consulted 7/25/22 for incidental findings of malrotation, previously demonstrated on prior CT from 2018, no s/s of obstruction at this time, exam without focal tenderness. Patient denies chewing/swallowing difficulties at meals, declined diet texture/consistency modification. Denies GI distress (nausea/vomiting/diarrhea/constipation) at this time. Chem labs 8/1 reviewed, unremarkable.

## 2022-08-01 NOTE — BH CONSULTATION LIAISON PROGRESS NOTE - NSBHCONSULTFOLLOWAFTERCARE_PSY_A_CORE FT
Recommend inpatient rehab for substance use treatment  Additional referrals:  [facility] ([facility]): [phone]  [facility] outpt. walk in clinic : [phone]

## 2022-08-01 NOTE — DIETITIAN INITIAL EVALUATION ADULT - PERTINENT MEDS FT
MEDICATIONS  (STANDING):  lidocaine   4% Patch 1 Patch Transdermal daily  melatonin 6 milliGRAM(s) Oral at bedtime  mirtazapine 30 milliGRAM(s) Oral at bedtime  traZODone 150 milliGRAM(s) Oral at bedtime    MEDICATIONS  (PRN):  hydrOXYzine hydrochloride 50 milliGRAM(s) Oral every 8 hours PRN Anxiety  nicotine  Polacrilex Gum 2 milliGRAM(s) Oral every 4 hours PRN Nicotine cravings  traZODone 50 milliGRAM(s) Oral at bedtime PRN Insomnia

## 2022-08-01 NOTE — BH CONSULTATION LIAISON PROGRESS NOTE - NSBHCHARTREVIEWLAB_PSY_A_CORE FT
13.3   12.85 )-----------( 252      ( 27 Jul 2022 06:03 )             40.0   07-27    136  |  102  |  14  ----------------------------<  95  3.8   |  24  |  0.77    Ca    9.7      27 Jul 2022 06:03  Phos  3.3     07-27  Mg     1.90     07-27    
                      14.1   4.25  )-----------( 286      ( 01 Aug 2022 11:32 )             41.1   08-01    137  |  101  |  15  ----------------------------<  96  4.4   |  27  |  0.92    Ca    9.7      01 Aug 2022 11:32  Phos  2.6     08-01  Mg     2.00     08-01    
                      13.3   12.85 )-----------( 252      ( 27 Jul 2022 06:03 )             40.0   07-27    136  |  102  |  14  ----------------------------<  95  3.8   |  24  |  0.77    Ca    9.7      27 Jul 2022 06:03  Phos  3.3     07-27  Mg     1.90     07-27    
                      12.8   6.51  )-----------( 232      ( 26 Jul 2022 04:35 )             37.4   07-26    136  |  101  |  8   ----------------------------<  107<H>  3.6   |  26  |  0.73    Ca    9.2      26 Jul 2022 04:35  Phos  4.5     07-26  Mg     1.80     07-26    TPro  7.6  /  Alb  5.1<H>  /  TBili  0.2  /  DBili  x   /  AST  29  /  ALT  29  /  AlkPhos  69  07-24  
                      13.3   12.85 )-----------( 252      ( 27 Jul 2022 06:03 )             40.0   07-27    136  |  102  |  14  ----------------------------<  95  3.8   |  24  |  0.77    Ca    9.7      27 Jul 2022 06:03  Phos  3.3     07-27  Mg     1.90     07-27

## 2022-08-01 NOTE — BH CONSULTATION LIAISON PROGRESS NOTE - NSBHFUPINTERVALHXFT_PSY_A_CORE
Patient seen for follow up this AM with attending psychiatrist. Endorses frustration over being moved rooms due to covid exposure. States he has not slept well all weekend on his current medication regimen. Also expresses frustrations over having to wait for his PRN nicotine gum each time he asks. Currently is denying any additional symptoms of depression, lito or psychosis, no delusions elicited.     Patient reported that in terms of disposition, he was planning to continue moving forward with each option and provided consent for the most recent rehab in [facility].    Patient seen for follow up this AM with attending psychiatrist. Endorses frustration over being moved rooms due to covid exposure. States he has not slept well all weekend on his current medication regimen. Also expresses frustrations over having to wait for his PRN nicotine gum each time he asks. Currently is denying any additional symptoms of depression, lito or psychosis, no delusions elicited. Denies SI and HI    Patient reported that in terms of disposition, he was planning to continue moving forward with each option and provided consent for the most recent rehab in [facility].

## 2022-08-02 RX ADMIN — Medication 2 MILLIGRAM(S): at 17:53

## 2022-08-02 RX ADMIN — Medication 2 MILLIGRAM(S): at 03:27

## 2022-08-02 RX ADMIN — Medication 2 MILLIGRAM(S): at 23:30

## 2022-08-02 RX ADMIN — Medication 2 MILLIGRAM(S): at 13:49

## 2022-08-02 RX ADMIN — Medication 6 MILLIGRAM(S): at 23:30

## 2022-08-02 RX ADMIN — Medication 200 MILLIGRAM(S): at 22:59

## 2022-08-02 RX ADMIN — MIRTAZAPINE 30 MILLIGRAM(S): 45 TABLET, ORALLY DISINTEGRATING ORAL at 22:59

## 2022-08-03 LAB — SARS-COV-2 RNA SPEC QL NAA+PROBE: SIGNIFICANT CHANGE UP

## 2022-08-03 PROCEDURE — 99232 SBSQ HOSP IP/OBS MODERATE 35: CPT

## 2022-08-03 RX ORDER — HYDROXYZINE HCL 10 MG
1 TABLET ORAL
Qty: 0 | Refills: 0 | DISCHARGE
Start: 2022-08-03

## 2022-08-03 RX ORDER — TRAZODONE HCL 50 MG
2 TABLET ORAL
Qty: 0 | Refills: 0 | DISCHARGE
Start: 2022-08-03

## 2022-08-03 RX ORDER — MIRTAZAPINE 45 MG/1
1 TABLET, ORALLY DISINTEGRATING ORAL
Qty: 0 | Refills: 0 | DISCHARGE
Start: 2022-08-03

## 2022-08-03 RX ADMIN — Medication 2 MILLIGRAM(S): at 13:23

## 2022-08-03 RX ADMIN — Medication 6 MILLIGRAM(S): at 22:47

## 2022-08-03 RX ADMIN — Medication 2 MILLIGRAM(S): at 22:17

## 2022-08-03 RX ADMIN — MIRTAZAPINE 30 MILLIGRAM(S): 45 TABLET, ORALLY DISINTEGRATING ORAL at 22:47

## 2022-08-03 RX ADMIN — Medication 200 MILLIGRAM(S): at 22:47

## 2022-08-03 RX ADMIN — Medication 2 MILLIGRAM(S): at 17:30

## 2022-08-03 NOTE — BH CONSULTATION LIAISON PROGRESS NOTE - NSBHCHARTREVIEWVS_PSY_A_CORE FT
Vital Signs Last 24 Hrs  T(C): 36.3 (03 Aug 2022 13:18), Max: 36.8 (02 Aug 2022 22:45)  T(F): 97.4 (03 Aug 2022 13:18), Max: 98.2 (02 Aug 2022 22:45)  HR: 74 (03 Aug 2022 13:18) (74 - 103)  BP: 135/88 (03 Aug 2022 13:18) (119/84 - 135/88)  BP(mean): --  RR: 18 (03 Aug 2022 13:18) (17 - 18)  SpO2: 100% (03 Aug 2022 13:18) (98% - 100%)    Parameters below as of 03 Aug 2022 13:18  Patient On (Oxygen Delivery Method): room air    
Vital Signs Last 24 Hrs  T(C): 36.6 (29 Jul 2022 08:00), Max: 36.9 (28 Jul 2022 16:00)  T(F): 97.9 (29 Jul 2022 08:00), Max: 98.4 (28 Jul 2022 16:00)  HR: 77 (29 Jul 2022 08:00) (68 - 92)  BP: 135/84 (29 Jul 2022 08:00) (119/66 - 144/98)  BP(mean): --  RR: 18 (29 Jul 2022 08:00) (17 - 18)  SpO2: 99% (29 Jul 2022 08:00) (97% - 100%)    Parameters below as of 29 Jul 2022 08:00  Patient On (Oxygen Delivery Method): room air    
Vital Signs Last 24 Hrs  T(C): 36.7 (30 Jul 2022 12:27), Max: 37 (29 Jul 2022 23:00)  T(F): 98 (30 Jul 2022 12:27), Max: 98.6 (29 Jul 2022 23:00)  HR: 80 (30 Jul 2022 12:27) (72 - 85)  BP: 117/74 (30 Jul 2022 12:27) (117/74 - 132/88)  BP(mean): --  RR: 18 (30 Jul 2022 12:27) (18 - 18)  SpO2: 99% (30 Jul 2022 12:27) (98% - 100%)    Parameters below as of 30 Jul 2022 12:27  Patient On (Oxygen Delivery Method): room air    
Vital Signs Last 24 Hrs  T(C): 36.6 (26 Jul 2022 06:22), Max: 36.7 (25 Jul 2022 21:43)  T(F): 97.9 (26 Jul 2022 06:22), Max: 98.1 (25 Jul 2022 21:43)  HR: 80 (26 Jul 2022 06:22) (74 - 83)  BP: 121/73 (26 Jul 2022 06:22) (112/65 - 126/73)  BP(mean): --  RR: 17 (26 Jul 2022 06:22) (17 - 18)  SpO2: 98% (26 Jul 2022 06:22) (98% - 100%)    Parameters below as of 26 Jul 2022 06:22  Patient On (Oxygen Delivery Method): room air    
Vital Signs Last 24 Hrs  T(C): 36.7 (01 Aug 2022 11:29), Max: 36.7 (31 Jul 2022 22:30)  T(F): 98 (01 Aug 2022 11:29), Max: 98 (31 Jul 2022 22:30)  HR: 90 (01 Aug 2022 11:29) (70 - 90)  BP: 123/73 (01 Aug 2022 11:29) (122/74 - 123/73)  BP(mean): --  RR: 18 (01 Aug 2022 11:29) (18 - 18)  SpO2: 100% (01 Aug 2022 11:29) (100% - 100%)    Parameters below as of 01 Aug 2022 11:29  Patient On (Oxygen Delivery Method): room air    
Vital Signs Last 24 Hrs  T(C): 36.4 (27 Jul 2022 10:37), Max: 36.9 (26 Jul 2022 16:41)  T(F): 97.5 (27 Jul 2022 10:37), Max: 98.4 (26 Jul 2022 16:41)  HR: 75 (27 Jul 2022 10:37) (71 - 118)  BP: 101/69 (27 Jul 2022 10:37) (101/69 - 127/78)  BP(mean): --  RR: 16 (27 Jul 2022 10:37) (16 - 17)  SpO2: 100% (27 Jul 2022 10:37) (100% - 100%)    Parameters below as of 27 Jul 2022 10:37  Patient On (Oxygen Delivery Method): room air

## 2022-08-03 NOTE — PROGRESS NOTE ADULT - ASSESSMENT
32M with hx of 32M with hx of polysubstance abuse (Percocet, Xanax, Marijuana), recent discharge from [facility] after stay for psychosis and lito in the context of substance abuse, who presents after ?syncope vs. arrest, who presents after ?syncope vs. arrest. s/p CPR by bystanders, and narcan with EMS. 
32M with hx of 32M with hx of polysubstance abuse (Percocet, Xanax, Marijuana), recent discharge from [facility] after stay for psychosis and lito in the context of substance abuse, who presents after ?syncope vs. arrest, who presents after ?syncope vs. arrest. s/p CPR by bystanders, and narcan with EMS. 
32M with hx of 32M with hx of polysubstance abuse (Percocet, Xanax, Marijuana), recent discharge from [facility] after stay for psychosis and lito in the context of substance abuse, who presents after ?syncope vs. arrest, who presents after ?syncope vs. arrest. s/p CPR by bystanders, and narcan with EMS. 
32M with hx of 32M with hx of polysubstance abuse (Percocet, Xanax, Marijuana), recent discharge from [facility] after stay for psychosis and lito in the context of substance abuse, who presents after ?syncope vs. arrest, who presents after ?syncope vs. arrest. s/p CPR by bystanders, and narcan with EMS. 
32M with hx of 32M with hx of polysubstance abuse (Percocet, Xanax, Marijuana), recent discharge from [facility] after stay for psychosis and lito in the context of substance abuse, who presents after ?syncope vs. arrest, who presents after ?syncope vs. arrest. s/p CPR by bystanders, and narcan with EMS. 
32M with hx of 32M with hx of polysubstance abuse (Percocet, Xanax, Marijuana), recent discharge from [facility] after stay for psychosis and lito in the context of substance abuse, who presents after ?syncope vs. arrest, who presents after ?syncope vs. arrest. s/p CPR by bystanders, and narcan with EMS. 
32M with hx of 32M with hx of polysubstance abuse (Percocet, Xanax, Marijuana), recent discharge from [facility] after stay for psychosis and lito in the context of substance abuse, who presents after ?syncope vs. arrest, who presents after ?syncope vs. arrest. s/p CPR by bystanders, and narcan with EMS. 
32M with hx of 32M with hx of polysubstance abuse (Percocet, Xanax, Marijuana), recent discharge from [facility] after stay for psychosis and lito in the context of substance abuse, who presents after ?syncope vs. arrest, who presents after ?syncope vs. arrest. s/p CPR by bystanders, and narcan with EMS. 
32M with hx of 32M with hx of polysubstance abuse (Percocet, Xanax, Marijuana), recent discharge from [facility] after stay for psychosis and lito in the context of substance abuse, who presents after ?syncope vs. arrest, who presents after ?syncope vs. arrest. s/p CPR by bystanders, and narcan with EMS. 
32M with hx of 32M with hx of polysubstance abuse (Percocet, Xanax, Marijuana), recent discharge from [facility] after stay for psychosis and lito in the context of substance abuse, who presents after ?syncope vs. arrest, who presents after ?syncope vs. arrest. s/p CPR by bystanders, and narcan with EMS.

## 2022-08-03 NOTE — BH CONSULTATION LIAISON PROGRESS NOTE - CURRENT MEDICATION
MEDICATIONS  (STANDING):  lidocaine   4% Patch 1 Patch Transdermal daily  melatonin 6 milliGRAM(s) Oral at bedtime  mirtazapine 30 milliGRAM(s) Oral at bedtime  traZODone 150 milliGRAM(s) Oral at bedtime    MEDICATIONS  (PRN):  hydrOXYzine hydrochloride 50 milliGRAM(s) Oral every 8 hours PRN Anxiety  nicotine  Polacrilex Gum 2 milliGRAM(s) Oral every 4 hours PRN Nicotine cravings  traZODone 50 milliGRAM(s) Oral at bedtime PRN Insomnia  
MEDICATIONS  (STANDING):  lidocaine   4% Patch 1 Patch Transdermal daily  mirtazapine 15 milliGRAM(s) Oral at bedtime    MEDICATIONS  (PRN):  nicotine  Polacrilex Gum 2 milliGRAM(s) Oral every 4 hours PRN Nicotine cravings  traZODone 100 milliGRAM(s) Oral at bedtime PRN insomnia  
MEDICATIONS  (STANDING):  lidocaine   4% Patch 1 Patch Transdermal daily  melatonin 6 milliGRAM(s) Oral at bedtime  mirtazapine 30 milliGRAM(s) Oral at bedtime  traZODone 150 milliGRAM(s) Oral at bedtime    MEDICATIONS  (PRN):  hydrOXYzine hydrochloride 50 milliGRAM(s) Oral every 8 hours PRN Anxiety  nicotine  Polacrilex Gum 2 milliGRAM(s) Oral every 4 hours PRN Nicotine cravings  traZODone 50 milliGRAM(s) Oral at bedtime PRN Insomnia  
MEDICATIONS  (STANDING):  lidocaine   4% Patch 1 Patch Transdermal daily  melatonin 6 milliGRAM(s) Oral at bedtime  mirtazapine 15 milliGRAM(s) Oral at bedtime  traZODone 150 milliGRAM(s) Oral at bedtime    MEDICATIONS  (PRN):  diphenhydrAMINE 50 milliGRAM(s) Oral at bedtime PRN Insomnia  hydrOXYzine hydrochloride 50 milliGRAM(s) Oral every 8 hours PRN Anxiety  nicotine  Polacrilex Gum 2 milliGRAM(s) Oral every 4 hours PRN Nicotine cravings  
MEDICATIONS  (STANDING):  lidocaine   4% Patch 1 Patch Transdermal daily  melatonin 6 milliGRAM(s) Oral at bedtime  mirtazapine 30 milliGRAM(s) Oral at bedtime  traZODone 200 milliGRAM(s) Oral at bedtime    MEDICATIONS  (PRN):  hydrOXYzine hydrochloride 50 milliGRAM(s) Oral every 8 hours PRN Anxiety  nicotine  Polacrilex Gum 2 milliGRAM(s) Oral every 4 hours PRN Nicotine cravings  
MEDICATIONS  (STANDING):  lidocaine   4% Patch 1 Patch Transdermal daily  mirtazapine 15 milliGRAM(s) Oral at bedtime    MEDICATIONS  (PRN):  nicotine  Polacrilex Gum 2 milliGRAM(s) Oral every 4 hours PRN Nicotine cravings  traZODone 100 milliGRAM(s) Oral at bedtime PRN insomnia

## 2022-08-03 NOTE — BH CONSULTATION LIAISON PROGRESS NOTE - NSBHPTASSESSDT_PSY_A_CORE
27-Jul-2022 11:24
03-Aug-2022 14:17
29-Jul-2022 11:39
01-Aug-2022 16:19
30-Jul-2022 13:27
26-Jul-2022 09:11

## 2022-08-03 NOTE — PROGRESS NOTE ADULT - PROBLEM SELECTOR PROBLEM 2
Systemic inflammatory response syndrome (SIRS)

## 2022-08-03 NOTE — BH CONSULTATION LIAISON PROGRESS NOTE - NSBHATTESTTYPEVISIT_PSY_A_CORE
Attending with Resident/Fellow/Student
Resident/Fellow with telephonic supervision
Attending with Resident/Fellow/Student

## 2022-08-03 NOTE — BH CONSULTATION LIAISON PROGRESS NOTE - NSBHCONSULTMEDNEW_PSY_A_CORE
Physical Therapy  Weekly Note  Evaluating Therapist: Ilene Fu DPT [id number]    NAME: Reina Esparza  ROOM: [id number]  DIAGNOSIS: Closed L hip fracture  PRECAUTIONS: Falls, WBAT LLE, Jamul, L post-op shoe  PROCEDURE(S): L hip closed reduction and cephalomedullary nail fixation 6/4/20  HPI: Pt suffered a fall at home when reaching towards the floor to adjust a rug. Pt was admitted to ED and had the above procedure performed. Significant PMHX of DM, HTN, arthritis, and CHF. Social:  Pt lives with her daughter in a ranch style floor plan with 2 steps and  1 rail (L side going up) to enter. Prior to admission pt ambulated with [employer] independently. Pt has 5 children who are local.     Initial Evaluation  6/17/20 6/18/20 6/25/20 7/1/20 Comments Short Term Goals Long Term Goals    Was pt agreeable to Eval/treatment? yes Yes Yes Yes      Does pt have pain? 7/10 L knee pain L hip pain with movement 10/10 L hip pain, B shoulder pain L hip pain      Bed Mobility  Rolling: Mod A  Supine to sit: Mod A  Sit to supine: Mod A  Scooting: Mod A Rolling: Mod A  Supine to sit: Mod A  Sit to supine: Mod A  Scooting: Mod A Rolling: SBA  Supine to sit: SBA   Sit to supine: SBA   Scooting: SBA Supervision for all components with shoes off Uses leg  to manage the LLE and bring her trunk up when sitting, has been given leg  SBA Supervision   Transfers Sit to stand: Mod A  Stand to sit: Mod A  Stand pivot: Mod A with [employer] Sit to stand: Mod A  Stand to sit: Mod A  Stand pivot:  Mod A with [employer] Sit to stand: SBA  Stand to sit: SBA  Stand pivot: SBA with [employer] Sit<>stand: SBA/Supervision  Stand pivot: SBA with [employer] Remains inconsistent with performance, when given time she is always able to stand Min A Supervision    Ambulation   10 feet with WW with Mod A 12 feet with WW with Min/Mod A 40 feet with WW with SBA 40 feet with [employer] with SBA/Supervision Antalgic and slow gait, limited by pain 50 feet with WW with Min A 75 feet with WW with SBA   Wheel
Chair Mobility NT NT NT NT      Car Transfers NT Max A Mod A (pt's daughter's vehicle) Mod A (pt's daughter's vehicle) Assist for BLE management into and out of vehicle Min A SBA   Stair negotiation: ascended and descended Attempted, unable to complete. Completed 2 inch step in parallel bars with backward descending 2 steps with 2 rails with Max A (descended retrograde) 4 steps with 2 rails with SBA (descended retrograde) 3 steps with 1 rail + R HHA with Min A (descended retrograde) 1 rail at daughter's home, unable to negotiate with an AD or sidesteps, multiple attempts 2 steps with 2 rails with Min A 4 steps with 2 rails with SBA   BLE ROM [facility]      BLE Strength RLE: 4-/5  L hip: 3-/5  L knee/ankle: 3+/5 RLE: 4-/5  L hip: 3-/5  L knee/ankle: 3+/5 RLE: 4-/5  L hip: 3-/5  L knee/ankle: 3+/5 RLE: 4-/5  L hip: 3-/5  L knee/ankle: 3+/5      Balance  Sitting: SBA  Standing: Mod A with [employer] Sitting: SBA  Standing: Mod A with [employer] Sitting: Supervision  Standing: SBA with [employer] Sitting: Supervision  Standing: SBA with [employer]      Date Family Teach Completed TBA Pt's granddaughter present for hands-on 6/17 Pt's granddaughter present for hands-on 6/17, daughter Melissa Ramirez present for hands-on 6/23, daughter Aj Mini present 6/25 for hands-on Pt's granddaughter present for hands-on 6/17, daughter Melissa Ramirez present for hands-on 6/23, daughter Tad Mini present 6/25 for hands-on      Is additional Family Teaching Needed? Y or N Y Yes Yes Yes Prior to discharge to review car and stairs     Hindering Progress Pain, weakness Pain, weakness Pain, weakness Pain, weakness      PT recommended ELOS 2-3 weeks 2 weeks 1 week Thursday (7/1/20)      Team's Discharge Plan  2 weeks Discharge Thursday 7/2/20 Discharge Thursday 7/1      Therapist at [facility]  Mitchell Dhaliwal, PT, DPT [id number]   Mitchell Dhaliwal, PT, DPT [id number]   Mahogany Mercer, PT, DPT  [phone]        Date:  6/19/20  Supporting factors:   Motivated, good PLOF  Barriers to
discharge:  Daughter works, pain  Additional comments: The pt is making some improvement, her progress is inconsistent and doesn't always maintain from one session to the next. The pt has good family support but her primary support source works through the day. The pt's granddaughter came to a session and did help transfer and ambulate the pt with good technique. DME:  None from PT, pt owns a [employer]  After Care:  HHPT    Date:  6/26/20  Supporting factors:  Good PLOF  Barriers to discharge:  Daughter works, pain, can be self-limiting  Additional comments: The pt is making good progress, she is now able to perform most functional mobility unassisted, her motivation has been deteriorating over the past week and she is now requiring more assistance to attempt activity. The pt's daughter was present for a family teach session and was able to assist the pt with car transfers, the pt required no assistance to transfer, ambulate, or perform bed mobility with a leg . However, the pt was unable to perform stair negotiation with a single railing despite therapist assistance (pt's daughter reported only a single rail to enter her home). Stair negotiation will be emphasized over the next week. The pt is now wearing a L post-op shoe per podiatry for some soft tissue swelling in the L foot. DME:  None from PT, pt owns a [employer]  After Care:  HHPT    Date:  7/1/20  Supporting factors:  Good PLOF  Barriers to discharge:  Daughter works, pain, can be self-limiting  Additional comments: The pt continues to make good progress, her daughter has been present for a family teach session but should have 1 additional session prior to discharge as the pt was unable to negotiate steps appropriately with a single rail during the previous family teach session.  Stair negotiation has been attempted a number of ways and the pt has been unable to negotiate steps with a single rail any way other than using HHA, daughter should come to ensure she
is able to assist the pt in this way. DME:  None from a PT perspective, pt owns a [employer]  After Care:  Navin Loja Serve, [id number] [facility] [place] Stover  number:  [phone]
no

## 2022-08-03 NOTE — SOCIAL WORK POST DISCHARGE FOLLOW UP NOTE - NSBHSWFOLLOWUP_PSY_ALL_CORE_FT
Pt missed his follow up appt.  Pt was rehospitalized at [facility].  Pt was encouraged to comply with continuity of care needs throughout hospital stay and at time of discharge. At time of Pt's discharge treatment team deemed Pt was not at risk to self nor others.  No further SW interventions needed at this time.

## 2022-08-03 NOTE — BH CONSULTATION LIAISON PROGRESS NOTE - NSBHCONSULTFOLLOWAFTERCARE_PSY_A_CORE FT
Recommend inpatient rehab for substance use treatment  Additional referrals:  [facility] ([facility]): [phone]  [facility] outpt. walk in clinic : [phone]

## 2022-08-03 NOTE — BH CONSULTATION LIAISON PROGRESS NOTE - NSBHATTESTBILLINGAW_PSY_A_CORE
Non-billable
[id number]-Njmphejpce Inpatient care - moderate complexity - 25 minutes
52492-Qaopsavztf Inpatient care - moderate complexity - 25 minutes
78135-Jmhoxfneqj Inpatient care - high complexity - 35 minutes
79963-Hoaokmsmhd Inpatient care - high complexity - 35 minutes
45531-Cpbkwmebov Inpatient care - high complexity - 35 minutes

## 2022-08-03 NOTE — BH CONSULTATION LIAISON PROGRESS NOTE - NSBHPSYCHOLCOGORIENT_PSY_A_CORE
Oriented to time, place, person, situation
Unable to assess
Oriented to time, place, person, situation

## 2022-08-03 NOTE — PROGRESS NOTE ADULT - REASON FOR ADMISSION
syncope and collapse

## 2022-08-03 NOTE — PROGRESS NOTE ADULT - SUBJECTIVE AND OBJECTIVE BOX
SUBJECTIVE / OVERNIGHT EVENTS:pt denies chest pain, shortness of breath , nausea, vomiting, abd pain    MEDICATIONS  (STANDING):  lidocaine   4% Patch 1 Patch Transdermal daily  melatonin 6 milliGRAM(s) Oral at bedtime  mirtazapine 15 milliGRAM(s) Oral at bedtime  traZODone 150 milliGRAM(s) Oral at bedtime    MEDICATIONS  (PRN):  diphenhydrAMINE 50 milliGRAM(s) Oral at bedtime PRN Insomnia  hydrOXYzine hydrochloride 50 milliGRAM(s) Oral every 8 hours PRN Anxiety  nicotine  Polacrilex Gum 2 milliGRAM(s) Oral every 4 hours PRN Nicotine cravings    Vital Signs Last 24 Hrs  T(C): 36.9 (07-28-22 @ 20:00), Max: 36.9 (07-28-22 @ 16:00)  T(F): 98.4 (07-28-22 @ 20:00), Max: 98.4 (07-28-22 @ 16:00)  HR: 87 (07-28-22 @ 20:00) (63 - 87)  BP: 128/80 (07-28-22 @ 20:00) (106/62 - 130/87)  BP(mean): --  RR: 18 (07-28-22 @ 20:00) (17 - 18)  SpO2: 100% (07-28-22 @ 20:00) (100% - 100%)      Constitutional: No fever, fatigue  Skin: No rash.  Eyes: No recent vision problems or eye pain.  ENT: No congestion, ear pain, or sore throat.  Cardiovascular: No chest pain or palpation.  Respiratory: No cough, shortness of breath, congestion, or wheezing.  Gastrointestinal: No abdominal pain, nausea, vomiting, or diarrhea.  Genitourinary: No dysuria.  Musculoskeletal: No joint swelling.  Neurologic: No headache.    PHYSICAL EXAM:  GENERAL: NAD  EYES: EOMI, PERRLA  NECK: Supple, No JVD  CHEST/LUNG: dec breath sounds rt base  HEART:  S1 , S2 +  ABDOMEN: soft , bs+, no distension  EXTREMITIES:  no edema  NEUROLOGY:alert awake  LABS:  07-28    138  |  104  |  12  ----------------------------<  104<H>  4.2   |  25  |  0.80    Ca    9.4      28 Jul 2022 07:29  Phos  3.2     07-28  Mg     1.90     07-28      Creatinine Trend: 0.80 <--, 0.76 <--, 0.77 <--, 0.73 <--, 0.68 <--, 1.01 <--                        13.3   5.40  )-----------( 248      ( 28 Jul 2022 07:29 )             39.1     Urine Studies:                                      Imaging Personally Reviewed:    Consultant(s) Notes Reviewed:      Care Discussed with Consultants/Other Providers:  
    SUBJECTIVE / OVERNIGHT EVENTS:pt denies chest pain, shortness of breath , nausea, vomiting, abd pain    MEDICATIONS  (STANDING):  lidocaine   4% Patch 1 Patch Transdermal daily  melatonin 6 milliGRAM(s) Oral at bedtime  mirtazapine 30 milliGRAM(s) Oral at bedtime  traZODone 150 milliGRAM(s) Oral at bedtime    MEDICATIONS  (PRN):  hydrOXYzine hydrochloride 50 milliGRAM(s) Oral every 8 hours PRN Anxiety  nicotine  Polacrilex Gum 2 milliGRAM(s) Oral every 4 hours PRN Nicotine cravings  traZODone 50 milliGRAM(s) Oral at bedtime PRN Insomnia    Vital Signs Last 24 Hrs  T(C): 36.7 (07-30-22 @ 12:27), Max: 37 (07-29-22 @ 23:00)  T(F): 98 (07-30-22 @ 12:27), Max: 98.6 (07-29-22 @ 23:00)  HR: 80 (07-30-22 @ 12:27) (72 - 80)  BP: 117/74 (07-30-22 @ 12:27) (117/74 - 122/70)  BP(mean): --  RR: 18 (07-30-22 @ 12:27) (18 - 18)  SpO2: 99% (07-30-22 @ 12:27) (98% - 99%)        Constitutional: No fever, fatigue  Skin: No rash.  Eyes: No recent vision problems or eye pain.  ENT: No congestion, ear pain, or sore throat.  Cardiovascular: No chest pain or palpation.  Respiratory: No cough, shortness of breath, congestion, or wheezing.  Gastrointestinal: No abdominal pain, nausea, vomiting, or diarrhea.  Genitourinary: No dysuria.  Musculoskeletal: No joint swelling.  Neurologic: No headache.    PHYSICAL EXAM:  GENERAL: NAD  EYES: EOMI, PERRLA  NECK: Supple, No JVD  CHEST/LUNG: dec breath sounds rt base  HEART:  S1 , S2 +  ABDOMEN: soft , bs+, no distension  EXTREMITIES:  no edema  NEUROLOGY:alert awake    LABS:  07-30    138  |  102  |  12  ----------------------------<  131<H>  4.1   |  27  |  0.88    Ca    9.6      30 Jul 2022 11:27  Phos  3.9     07-30  Mg     1.90     07-30      Creatinine Trend: 0.88 <--, 0.88 <--, 0.80 <--, 0.76 <--, 0.77 <--, 0.73 <--, 0.68 <--, 1.01 <--                        13.3   5.43  )-----------( 258      ( 30 Jul 2022 11:27 )             39.8     Urine Studies:                                                                Imaging Personally Reviewed:    Consultant(s) Notes Reviewed:      Care Discussed with Consultants/Other Providers:  
    SUBJECTIVE / OVERNIGHT EVENTS:pt denies chest pain, shortness of breath , nausea, vomiting, abd pain    MEDICATIONS  (STANDING):  lidocaine   4% Patch 1 Patch Transdermal daily  melatonin 6 milliGRAM(s) Oral at bedtime  mirtazapine 30 milliGRAM(s) Oral at bedtime  traZODone 150 milliGRAM(s) Oral at bedtime    MEDICATIONS  (PRN):  hydrOXYzine hydrochloride 50 milliGRAM(s) Oral every 8 hours PRN Anxiety  nicotine  Polacrilex Gum 2 milliGRAM(s) Oral every 4 hours PRN Nicotine cravings  traZODone 50 milliGRAM(s) Oral at bedtime PRN Insomnia    Vital Signs Last 24 Hrs  T(C): 37 (07-29-22 @ 23:00), Max: 37 (07-29-22 @ 23:00)  T(F): 98.6 (07-29-22 @ 23:00), Max: 98.6 (07-29-22 @ 23:00)  HR: 72 (07-29-22 @ 23:00) (68 - 92)  BP: 122/70 (07-29-22 @ 23:00) (119/66 - 144/98)  BP(mean): --  RR: 18 (07-29-22 @ 23:00) (18 - 18)  SpO2: 98% (07-29-22 @ 23:00) (97% - 100%)      Constitutional: No fever, fatigue  Skin: No rash.  Eyes: No recent vision problems or eye pain.  ENT: No congestion, ear pain, or sore throat.  Cardiovascular: No chest pain or palpation.  Respiratory: No cough, shortness of breath, congestion, or wheezing.  Gastrointestinal: No abdominal pain, nausea, vomiting, or diarrhea.  Genitourinary: No dysuria.  Musculoskeletal: No joint swelling.  Neurologic: No headache.    PHYSICAL EXAM:  GENERAL: NAD  EYES: EOMI, PERRLA  NECK: Supple, No JVD  CHEST/LUNG: dec breath sounds rt base  HEART:  S1 , S2 +  ABDOMEN: soft , bs+, no distension  EXTREMITIES:  no edema  NEUROLOGY:alert awake    LABS:  07-29    133<L>  |  97<L>  |  13  ----------------------------<  78  3.7   |  27  |  0.88    Ca    9.6      29 Jul 2022 05:44  Phos  4.3     07-29  Mg     1.90     07-29      Creatinine Trend: 0.88 <--, 0.80 <--, 0.76 <--, 0.77 <--, 0.73 <--, 0.68 <--, 1.01 <--                        13.3   7.33  )-----------( 281      ( 29 Jul 2022 05:44 )             41.1     Urine Studies:                                                    Imaging Personally Reviewed:    Consultant(s) Notes Reviewed:      Care Discussed with Consultants/Other Providers:  
    SUBJECTIVE / OVERNIGHT EVENTS:pt denies chest pain, shortness of breath , nausea, vomiting, abd pain  in isolation sec to covid exposure     MEDICATIONS  (STANDING):  lidocaine   4% Patch 1 Patch Transdermal daily  melatonin 6 milliGRAM(s) Oral at bedtime  mirtazapine 30 milliGRAM(s) Oral at bedtime  traZODone 200 milliGRAM(s) Oral at bedtime    MEDICATIONS  (PRN):  hydrOXYzine hydrochloride 50 milliGRAM(s) Oral every 8 hours PRN Anxiety  nicotine  Polacrilex Gum 2 milliGRAM(s) Oral every 4 hours PRN Nicotine cravings    Vital Signs Last 24 Hrs  T(C): 36.7 (08-01-22 @ 11:29), Max: 36.7 (08-01-22 @ 11:29)  T(F): 98 (08-01-22 @ 11:29), Max: 98 (08-01-22 @ 11:29)  HR: 90 (08-01-22 @ 11:29) (76 - 90)  BP: 123/73 (08-01-22 @ 11:29) (123/73 - 123/73)  BP(mean): --  RR: 18 (08-01-22 @ 11:29) (18 - 18)  SpO2: 100% (08-01-22 @ 11:29) (100% - 100%)          Constitutional: No fever, fatigue  Skin: No rash.  Eyes: No recent vision problems or eye pain.  ENT: No congestion, ear pain, or sore throat.  Cardiovascular: No chest pain or palpation.  Respiratory: No cough, shortness of breath, congestion, or wheezing.  Gastrointestinal: No abdominal pain, nausea, vomiting, or diarrhea.  Genitourinary: No dysuria.  Musculoskeletal: No joint swelling.  Neurologic: No headache.    PHYSICAL EXAM:  GENERAL: NAD  EYES: EOMI, PERRLA  NECK: Supple, No JVD  CHEST/LUNG: dec breath sounds rt base  HEART:  S1 , S2 +  ABDOMEN: soft , bs+, no distension  EXTREMITIES:  no edema  NEUROLOGY:alert awake    LABS:  08-01    137  |  101  |  15  ----------------------------<  96  4.4   |  27  |  0.92    Ca    9.7      01 Aug 2022 11:32  Phos  2.6     08-01  Mg     2.00     08-01      Creatinine Trend: 0.92 <--, 1.11 <--, 0.88 <--, 0.88 <--, 0.80 <--, 0.76 <--, 0.77 <--, 0.73 <--                        14.1   4.25  )-----------( 286      ( 01 Aug 2022 11:32 )             41.1     Urine Studies:                                                                                        Imaging Personally Reviewed:    Consultant(s) Notes Reviewed:      Care Discussed with Consultants/Other Providers:  
    SUBJECTIVE / OVERNIGHT EVENTS:pt denies chest pain, shortness of breath , nausea, vomiting, abd pain    MEDICATIONS  (STANDING):  lidocaine   4% Patch 1 Patch Transdermal daily  melatonin 6 milliGRAM(s) Oral at bedtime  mirtazapine 30 milliGRAM(s) Oral at bedtime  traZODone 200 milliGRAM(s) Oral at bedtime    MEDICATIONS  (PRN):  hydrOXYzine hydrochloride 50 milliGRAM(s) Oral every 8 hours PRN Anxiety  nicotine  Polacrilex Gum 2 milliGRAM(s) Oral every 4 hours PRN Nicotine cravings    Vital Signs Last 24 Hrs  T(C): 36.3 (08-03-22 @ 13:18), Max: 36.8 (08-02-22 @ 22:45)  T(F): 97.4 (08-03-22 @ 13:18), Max: 98.2 (08-02-22 @ 22:45)  HR: 74 (08-03-22 @ 13:18) (74 - 103)  BP: 135/88 (08-03-22 @ 13:18) (119/84 - 135/88)  BP(mean): --  RR: 18 (08-03-22 @ 13:18) (17 - 18)  SpO2: 100% (08-03-22 @ 13:18) (98% - 100%)            Constitutional: No fever, fatigue  Skin: No rash.  Eyes: No recent vision problems or eye pain.  ENT: No congestion, ear pain, or sore throat.  Cardiovascular: No chest pain or palpation.  Respiratory: No cough, shortness of breath, congestion, or wheezing.  Gastrointestinal: No abdominal pain, nausea, vomiting, or diarrhea.  Genitourinary: No dysuria.  Musculoskeletal: No joint swelling.  Neurologic: No headache.    PHYSICAL EXAM:  GENERAL: NAD  EYES: EOMI, PERRLA  NECK: Supple, No JVD  CHEST/LUNG: dec breath sounds rt base  HEART:  S1 , S2 +  ABDOMEN: soft , bs+, no distension  EXTREMITIES:  no edema  NEUROLOGY:alert awake    LABS:        Creatinine Trend: 0.92 <--, 1.11 <--, 0.88 <--, 0.88 <--, 0.80 <--    Urine Studies:                                                                                                                  Imaging Personally Reviewed:    Consultant(s) Notes Reviewed:      Care Discussed with Consultants/Other Providers:  
    SUBJECTIVE / OVERNIGHT EVENTS:pt denies chest pain, shortness of breath , nausea, vomiting, abd pain  in isolation sec to covid exposure     MEDICATIONS  (STANDING):  lidocaine   4% Patch 1 Patch Transdermal daily  melatonin 6 milliGRAM(s) Oral at bedtime  mirtazapine 30 milliGRAM(s) Oral at bedtime  traZODone 150 milliGRAM(s) Oral at bedtime    MEDICATIONS  (PRN):  hydrOXYzine hydrochloride 50 milliGRAM(s) Oral every 8 hours PRN Anxiety  nicotine  Polacrilex Gum 2 milliGRAM(s) Oral every 4 hours PRN Nicotine cravings  traZODone 50 milliGRAM(s) Oral at bedtime PRN Insomnia    Vital Signs Last 24 Hrs  T(C): 36.6 (07-31-22 @ 12:00), Max: 36.6 (07-31-22 @ 12:00)  T(F): 97.8 (07-31-22 @ 12:00), Max: 97.8 (07-31-22 @ 12:00)  HR: 60 (07-31-22 @ 12:00) (60 - 68)  BP: 127/80 (07-31-22 @ 12:00) (127/80 - 127/80)  BP(mean): --  RR: 18 (07-31-22 @ 12:00) (18 - 18)  SpO2: 100% (07-31-22 @ 12:00) (100% - 100%)          Constitutional: No fever, fatigue  Skin: No rash.  Eyes: No recent vision problems or eye pain.  ENT: No congestion, ear pain, or sore throat.  Cardiovascular: No chest pain or palpation.  Respiratory: No cough, shortness of breath, congestion, or wheezing.  Gastrointestinal: No abdominal pain, nausea, vomiting, or diarrhea.  Genitourinary: No dysuria.  Musculoskeletal: No joint swelling.  Neurologic: No headache.    PHYSICAL EXAM:  GENERAL: NAD  EYES: EOMI, PERRLA  NECK: Supple, No JVD  CHEST/LUNG: dec breath sounds rt base  HEART:  S1 , S2 +  ABDOMEN: soft , bs+, no distension  EXTREMITIES:  no edema  NEUROLOGY:alert awake    LABS:  07-31    138  |  98  |  16  ----------------------------<  124<H>  3.8   |  28  |  1.11    Ca    10.1      31 Jul 2022 14:53  Phos  2.8     07-31  Mg     1.90     07-31      Creatinine Trend: 1.11 <--, 0.88 <--, 0.88 <--, 0.80 <--, 0.76 <--, 0.77 <--, 0.73 <--, 0.68 <--                        14.3   7.39  )-----------( 327      ( 31 Jul 2022 14:53 )             42.1     Urine Studies:                                                                          Imaging Personally Reviewed:    Consultant(s) Notes Reviewed:      Care Discussed with Consultants/Other Providers:  
    SUBJECTIVE / OVERNIGHT EVENTS:pt denies chest pain, shortness of breath , nausea, vomiting, abd pain    MEDICATIONS  (STANDING):  lidocaine   4% Patch 1 Patch Transdermal daily  mirtazapine 15 milliGRAM(s) Oral at bedtime    MEDICATIONS  (PRN):  nicotine  Polacrilex Gum 2 milliGRAM(s) Oral every 4 hours PRN Nicotine cravings  traZODone 100 milliGRAM(s) Oral at bedtime PRN insomnia    Vital Signs Last 24 Hrs  T(C): 36.2 (07-26-22 @ 20:54), Max: 36.9 (07-26-22 @ 16:41)  T(F): 97.2 (07-26-22 @ 20:54), Max: 98.4 (07-26-22 @ 16:41)  HR: 72 (07-26-22 @ 20:54) (72 - 118)  BP: 122/85 (07-26-22 @ 20:54) (112/65 - 127/78)  BP(mean): --  RR: 17 (07-26-22 @ 20:54) (16 - 18)  SpO2: 100% (07-26-22 @ 20:54) (98% - 100%)    Constitutional: No fever, fatigue  Skin: No rash.  Eyes: No recent vision problems or eye pain.  ENT: No congestion, ear pain, or sore throat.  Cardiovascular: No chest pain or palpation.  Respiratory: No cough, shortness of breath, congestion, or wheezing.  Gastrointestinal: No abdominal pain, nausea, vomiting, or diarrhea.  Genitourinary: No dysuria.  Musculoskeletal: No joint swelling.  Neurologic: No headache.    PHYSICAL EXAM:  GENERAL: NAD  EYES: EOMI, PERRLA  NECK: Supple, No JVD  CHEST/LUNG: dec breath sounds rt base  HEART:  S1 , S2 +  ABDOMEN: soft , bs+, no distension  EXTREMITIES:  no edema  NEUROLOGY:alert awake      LABS:  07-26    136  |  101  |  8   ----------------------------<  107<H>  3.6   |  26  |  0.73    Ca    9.2      26 Jul 2022 04:35  Phos  4.5     07-26  Mg     1.80     07-26      Creatinine Trend: 0.73 <--, 0.68 <--, 1.01 <--                        12.8   6.51  )-----------( 232      ( 26 Jul 2022 04:35 )             37.4     Urine Studies:                Imaging Personally Reviewed:    Consultant(s) Notes Reviewed:      Care Discussed with Consultants/Other Providers:  
    SUBJECTIVE / OVERNIGHT EVENTS:pt denies chest pain, shortness of breath , nausea, vomiting, abd pain    MEDICATIONS  (STANDING):  lidocaine   4% Patch 1 Patch Transdermal daily  mirtazapine 15 milliGRAM(s) Oral at bedtime    MEDICATIONS  (PRN):  nicotine  Polacrilex Gum 2 milliGRAM(s) Oral every 4 hours PRN Nicotine cravings  traZODone 100 milliGRAM(s) Oral at bedtime PRN insomnia    T(C): 36.5 (07-25-22 @ 05:30), Max: 36.5 (07-25-22 @ 05:30)  HR: 76 (07-25-22 @ 18:19) (73 - 76)  BP: 126/73 (07-25-22 @ 18:19) (121/75 - 126/73)  RR: 18 (07-25-22 @ 18:19) (18 - 18)  SpO2: 100% (07-25-22 @ 18:19) (98% - 100%)    CAPILLARY BLOOD GLUCOSE        I&O's Summary    24 Jul 2022 07:01  -  25 Jul 2022 07:00  --------------------------------------------------------  IN: 0 mL / OUT: 1480 mL / NET: -1480 mL        Constitutional: No fever, fatigue  Skin: No rash.  Eyes: No recent vision problems or eye pain.  ENT: No congestion, ear pain, or sore throat.  Cardiovascular: No chest pain or palpation.  Respiratory: No cough, shortness of breath, congestion, or wheezing.  Gastrointestinal: No abdominal pain, nausea, vomiting, or diarrhea.  Genitourinary: No dysuria.  Musculoskeletal: No joint swelling.  Neurologic: No headache.    PHYSICAL EXAM:  GENERAL: NAD  EYES: EOMI, PERRLA  NECK: Supple, No JVD  CHEST/LUNG: dec breath sounds rt base  HEART:  S1 , S2 +  ABDOMEN: soft , bs+, no distension  EXTREMITIES:  no edema  NEUROLOGY:alert awake      LABS:                        12.5   8.14  )-----------( 243      ( 25 Jul 2022 06:17 )             37.8     07-25    138  |  102  |  6<L>  ----------------------------<  94  3.7   |  27  |  0.68    Ca    9.4      25 Jul 2022 06:17  Phos  3.9     07-25  Mg     1.90     07-25    TPro  7.6  /  Alb  5.1<H>  /  TBili  0.2  /  DBili  x   /  AST  29  /  ALT  29  /  AlkPhos  69  07-24      CARDIAC MARKERS ( 25 Jul 2022 00:37 )  x     / x     / 128 U/L / x     / 1.8 ng/mL  CARDIAC MARKERS ( 24 Jul 2022 18:30 )  x     / x     / 60 U/L / x     / 1.2 ng/mL          RADIOLOGY & ADDITIONAL TESTS:    Imaging Personally Reviewed:    Consultant(s) Notes Reviewed:      Care Discussed with Consultants/Other Providers:  
    SUBJECTIVE / OVERNIGHT EVENTS:pt denies chest pain, shortness of breath , nausea, vomiting, abd pain  in isolation sec to covid exposure     MEDICATIONS  (STANDING):  lidocaine   4% Patch 1 Patch Transdermal daily  melatonin 6 milliGRAM(s) Oral at bedtime  mirtazapine 30 milliGRAM(s) Oral at bedtime  traZODone 200 milliGRAM(s) Oral at bedtime    MEDICATIONS  (PRN):  hydrOXYzine hydrochloride 50 milliGRAM(s) Oral every 8 hours PRN Anxiety  nicotine  Polacrilex Gum 2 milliGRAM(s) Oral every 4 hours PRN Nicotine cravings    Vital Signs Last 24 Hrs  T(C): 36.6 (08-02-22 @ 11:25), Max: 36.8 (08-01-22 @ 21:40)  T(F): 97.8 (08-02-22 @ 11:25), Max: 98.2 (08-01-22 @ 21:40)  HR: 110 (08-02-22 @ 11:25) (96 - 140)  BP: 116/67 (08-02-22 @ 11:25) (116/67 - 122/78)  BP(mean): --  RR: 18 (08-02-22 @ 11:25) (18 - 18)  SpO2: 97% (08-02-22 @ 11:25) (97% - 100%)            Constitutional: No fever, fatigue  Skin: No rash.  Eyes: No recent vision problems or eye pain.  ENT: No congestion, ear pain, or sore throat.  Cardiovascular: No chest pain or palpation.  Respiratory: No cough, shortness of breath, congestion, or wheezing.  Gastrointestinal: No abdominal pain, nausea, vomiting, or diarrhea.  Genitourinary: No dysuria.  Musculoskeletal: No joint swelling.  Neurologic: No headache.    PHYSICAL EXAM:  GENERAL: NAD  EYES: EOMI, PERRLA  NECK: Supple, No JVD  CHEST/LUNG: dec breath sounds rt base  HEART:  S1 , S2 +  ABDOMEN: soft , bs+, no distension  EXTREMITIES:  no edema  NEUROLOGY:alert awake    LABS:  08-01    137  |  101  |  15  ----------------------------<  96  4.4   |  27  |  0.92    Ca    9.7      01 Aug 2022 11:32  Phos  2.6     08-01  Mg     2.00     08-01      Creatinine Trend: 0.92 <--, 1.11 <--, 0.88 <--, 0.88 <--, 0.80 <--, 0.76 <--, 0.77 <--                        14.1   4.25  )-----------( 286      ( 01 Aug 2022 11:32 )             41.1     Urine Studies:                                                                                                      Imaging Personally Reviewed:    Consultant(s) Notes Reviewed:      Care Discussed with Consultants/Other Providers:  
    SUBJECTIVE / OVERNIGHT EVENTS:pt denies chest pain, shortness of breath , nausea, vomiting, abd pain  pt was anxious earlier     MEDICATIONS  (STANDING):  lidocaine   4% Patch 1 Patch Transdermal daily  mirtazapine 15 milliGRAM(s) Oral at bedtime  sodium chloride 0.9%. 2000 milliLiter(s) (125 mL/Hr) IV Continuous <Continuous>    MEDICATIONS  (PRN):  diphenhydrAMINE 50 milliGRAM(s) Oral at bedtime PRN Insomnia  hydrOXYzine hydrochloride 50 milliGRAM(s) Oral every 8 hours PRN Anxiety  nicotine  Polacrilex Gum 2 milliGRAM(s) Oral every 4 hours PRN Nicotine cravings  traZODone 150 milliGRAM(s) Oral at bedtime PRN insomnia    Vital Signs Last 24 Hrs  T(C): 36.8 (07-27-22 @ 21:22), Max: 36.8 (07-27-22 @ 21:22)  T(F): 98.2 (07-27-22 @ 21:22), Max: 98.2 (07-27-22 @ 21:22)  HR: 104 (07-27-22 @ 21:22) (71 - 107)  BP: 120/74 (07-27-22 @ 21:22) (101/69 - 120/74)  BP(mean): --  RR: 18 (07-27-22 @ 21:22) (16 - 18)  SpO2: 100% (07-27-22 @ 21:22) (100% - 100%)      Constitutional: No fever, fatigue  Skin: No rash.  Eyes: No recent vision problems or eye pain.  ENT: No congestion, ear pain, or sore throat.  Cardiovascular: No chest pain or palpation.  Respiratory: No cough, shortness of breath, congestion, or wheezing.  Gastrointestinal: No abdominal pain, nausea, vomiting, or diarrhea.  Genitourinary: No dysuria.  Musculoskeletal: No joint swelling.  Neurologic: No headache.    PHYSICAL EXAM:  GENERAL: NAD  EYES: EOMI, PERRLA  NECK: Supple, No JVD  CHEST/LUNG: dec breath sounds rt base  HEART:  S1 , S2 +  ABDOMEN: soft , bs+, no distension  EXTREMITIES:  no edema  NEUROLOGY:alert awake    LABS:  07-27    137  |  101  |  11  ----------------------------<  135<H>  3.6   |  22  |  0.76    Ca    9.3      27 Jul 2022 18:00  Phos  3.0     07-27  Mg     1.80     07-27      Creatinine Trend: 0.76 <--, 0.77 <--, 0.73 <--, 0.68 <--, 1.01 <--                        13.3   7.95  )-----------( 279      ( 27 Jul 2022 18:00 )             38.7     Urine Studies:                        Imaging Personally Reviewed:    Consultant(s) Notes Reviewed:      Care Discussed with Consultants/Other Providers:

## 2022-08-03 NOTE — BH CONSULTATION LIAISON PROGRESS NOTE - NSICDXBHPRIMARYDX_PSY_ALL_CORE
Polysubstance dependence   F19.20  

## 2022-08-03 NOTE — BH CONSULTATION LIAISON PROGRESS NOTE - NSBHMSEHYG_PSY_A_CORE
Fair
Airway patent, Nasal mucosa clear. Mouth with normal mucosa. Throat has no vesicles, no oropharyngeal exudates and uvula is midline.
Fair

## 2022-08-03 NOTE — BH CONSULTATION LIAISON PROGRESS NOTE - NSBHFUPINTERVALHXFT_PSY_A_CORE
Patient seen for follow up this AM with attending psychiatrist and medical student. Reports that his sleep has improved on current dosage of trazodone. States he is feeling better following day as well as a result of improved sleep, not feeling as anxious. Is hopeful to be transferred to inpatient substance rehab possibly today. Currently is denying any additional symptoms of depression, lito or psychosis, no delusions elicited. Denies SI and HI

## 2022-08-03 NOTE — PROGRESS NOTE ADULT - PROBLEM SELECTOR PLAN 1
?episode of syncope vs. cardiac arrest with LOC and CPR by bystanders, s/p Narcan with EMS  -tele with no events  echo with nl lvfx
?episode of syncope vs. cardiac arrest with LOC and CPR by bystanders, s/p Narcan with EMS  -tele with no events  echo with nl lvfx
?episode of syncope vs. cardiac arrest with LOC and CPR by bystanders, s/p Narcan with EMS  -tele, cards f/u
?episode of syncope vs. cardiac arrest with LOC and CPR by bystanders, s/p Narcan with EMS  -tele, cards ras
?episode of syncope vs. cardiac arrest with LOC and CPR by bystanders, s/p Narcan with EMS  -tele with no events  echo with nl lvfx
?episode of syncope vs. cardiac arrest with LOC and CPR by bystanders, s/p Narcan with EMS  -tele, cards f/u  echo with nl lvfx
?episode of syncope vs. cardiac arrest with LOC and CPR by bystanders, s/p Narcan with EMS  -tele, cards f/u

## 2022-08-03 NOTE — BH CONSULTATION LIAISON PROGRESS NOTE - NSBHFUPINTERVALCCFT_PSY_A_CORE
"I'm sleeping better"   ANTHONY, VS notable for HR elevated into 110s, no psychiatric PRNs  Chart reviewed, case discussed

## 2022-08-03 NOTE — PROGRESS NOTE ADULT - PROBLEM SELECTOR PLAN 6
- low improve score - no pharmacological ppx indicated

## 2022-08-03 NOTE — PROGRESS NOTE ADULT - PROBLEM SELECTOR PROBLEM 4
Polysubstance dependence

## 2022-08-03 NOTE — PROGRESS NOTE ADULT - PROBLEM SELECTOR PLAN 2
SIRS positive (temp 100.3, tachycardic, leukocytosis)  - likely reactive  - no focal s/s of infection
SIRS positive (temp 100.3, tachycardic, leukocytosis)  - likely reactive  - no focal s/s of infection  - panculture,  - id F/U
SIRS positive (temp 100.3, tachycardic, leukocytosis)  - likely reactive  - no focal s/s of infection
SIRS positive (temp 100.3, tachycardic, leukocytosis)  - likely reactive  - no focal s/s of infection  - panculture,
SIRS positive (temp 100.3, tachycardic, leukocytosis)  - likely reactive  - no focal s/s of infection  - panculture,

## 2022-08-03 NOTE — BH CONSULTATION LIAISON PROGRESS NOTE - NSBHATTESTCOMMENTATTENDFT_PSY_A_CORE
Chart reviewed, pt. seen/evaluated with fellow, I agree with above assessment/plan. Patient oriented, well engaged, euthymic and polite. Reports improvement in sleep and denies feeling anxious. Denies SI and HI, no evidence of lito or psychosis. Patient remains hopeful/future oriented/treatment seeking,  remains motivated to abstain from substances and looking forward to go to inpatient substance abuse rehab. Plan as above. 
Chart reviewed, pt. seen/evaluated with fellow, I agree with above assessment/plan. Patient overall cooperative, continues to c/o insomnia, amenable to medication changes. Denies AVH, denies SI and HI. Patient remains treatment seeking and amenable to be transfer to substance abuse inpatient program. Plan as above, will follow
Chart reviewed. Pt seen briefly, calm nad oriented, engaged with medical provider who was reviewing echo results. Family at bedside. No acute safety concerns, SI, AVH, HI, or paranoia. Unclear if fully motivated for rehab care; please assess via SBIRT and if open to this can be arranged for inpatient substance use rehab. If not, can be given outpt resources (such as [facility] Outpatient number: [phone]. No psych contraindications to discharge when medically cleared (anticipated for tomorrow if no additional withdrawal signs/symptoms appear)
Chart reviewed. Pt calm with family. Open to rehab placement. No focal deficits noted. There appear to be no psych contraindications to discharge to inpatient rehab when indicated. No role for inpatient psych. Signing off. 
Chart reviewed, pt. seen/evaluated with trainee, I agree with above assessment/plan. Patient AAOX4, cooperative and well engaged. Patient expressed some frustration in the setting of ongoing disposition plans, c/o insomnia, remains highly motivated to go to inpatient substance abuse programs. Patient firmly denies SI and HI, denies feeling hopeless. No evidence of lito or psychosis. Care coordinated with patient's mother at bedside, KATIE Mccoy and Dr. Smith. Plan as above, will follow

## 2022-08-03 NOTE — BH CONSULTATION LIAISON PROGRESS NOTE - NSBHASSESSMENTFT_PSY_ALL_CORE
The patient is a 32-year-old man with a history of polysubstance use disorder presenting to [facility] after syncope vs cardiac arrest. Patient was recently discharged from [facility] L3 on 7/22. He presented to [facility] with symptoms of psychosis and lito in the context of substance use (THC, Percocet, possibly Suboxone, possibly Xanax) and lack of outpatient treatment. He was treated for suspected benzo w/d delirium, discharged on Librium 30mg BID with plan to taper outpatient. Pt now readmitted s/p Narcan resuscitation today. Psychiatry was consulted for med recs and management of withdrawal. On eval, patient is calm, cooperative. Not exhibiting signs and symptoms of withdrawal at this time other than abdominal pain (patient also with abdominal distention on CTAP). No SI, HI. No AVH. No delusions.    Clinical update 7/29: Psychiatry called for concerns about pt's sleep. Patient initially with improved sleep, now with insomnia. Will adjust sleep medications. No acute psychiatric safety concerns. No psychiatric contraindications to discharge- does not meet criteria for inpatient psych admission at this time. Agree with patient/family, primary team with referral to inpatient rehab for substance use treatment.    7/30: Pt endorsing good sleep overnight with increased dose of Remeron, did not require trazodone prn in addition to standing trazodone    8/1: Patient reporting poor sleep all weekend, will increase standing trazodone dosage . Denies SI and HI, amenable to be transfer to inpatient substance abuse program.    8/3: Improvement in sleep with increased dose of trazodone; As a result improvement in daytime anxiety. Pt hopeful for inpatient rehab. Denying SI/hi/avh/manic symptoms    Plan:   - Continue Remeron 30mg QHS for insomnia/mood  - Continue trazodone to 200mg QHS for insomnia  - continue atarax 50mg q6h PRN for anxiety  - avoid one time benzodiazepine doses, encourage alternative medications for anxiety and insomnia - please sign out to night team  - continue coordination with sbirt/SW  for inpatient rehab referral  - Patient is hopeful, treatment seeking, future oriented, amenable to inpatient substance abuse program, no evidence of SI/HI, not at acute risk of harm to self or others, does not meet criteria for inpatient psych care.   Primary team/ SW sending referrals to  inpatient substance abuse programs. Psych will follow, please don't hesitate to call for any further questions or concerns. Please let psychiatry C/L team know if we can be of any further assistance to facilitate a smooth disposition.  The patient is a 32-year-old man with a history of polysubstance use disorder presenting to [facility] after syncope vs cardiac arrest. Patient was recently discharged from [facility] L3 on 7/22. He presented to [facility] with symptoms of psychosis and lito in the context of substance use (THC, Percocet, possibly Suboxone, possibly Xanax) and lack of outpatient treatment. He was treated for suspected benzo w/d delirium, discharged on Librium 30mg BID with plan to taper outpatient. Pt now readmitted s/p Narcan resuscitation today. Psychiatry was consulted for med recs and management of withdrawal. On eval, patient is calm, cooperative. Not exhibiting signs and symptoms of withdrawal at this time other than abdominal pain (patient also with abdominal distention on CTAP). No SI, HI. No AVH. No delusions.    Clinical update 7/29: Psychiatry called for concerns about pt's sleep. Patient initially with improved sleep, now with insomnia. Will adjust sleep medications. No acute psychiatric safety concerns. No psychiatric contraindications to discharge- does not meet criteria for inpatient psych admission at this time. Agree with patient/family, primary team with referral to inpatient rehab for substance use treatment.    7/30: Pt endorsing good sleep overnight with increased dose of Remeron, did not require trazodone prn in addition to standing trazodone    8/1: Patient reporting poor sleep all weekend, will increase standing trazodone dosage . Denies SI and HI, amenable to be transfer to inpatient substance abuse program.    8/3: Improvement in sleep with increased dose of trazodone; As a result improvement in daytime anxiety. Pt hopeful for inpatient rehab. Denying SI/hi/avh/manic symptoms    Plan:   - Continue Remeron 30mg QHS for insomnia/mood  - Continue trazodone  200mg QHS for insomnia  - continue atarax 50mg q6h PRN for anxiety  - avoid one time benzodiazepine doses, encourage alternative medications for anxiety and insomnia - please sign out to night team  - continue coordination with sbirt/SW  for inpatient rehab referral  - Patient is hopeful, treatment seeking, future oriented, amenable to inpatient substance abuse program, no evidence of SI/HI, not at acute risk of harm to self or others, does not meet criteria for inpatient psych care.   Primary team/ SW sending referrals to  inpatient substance abuse programs. Psych will follow, please don't hesitate to call for any further questions or concerns. Please let psychiatry C/L team know if we can be of any further assistance to facilitate a smooth disposition.

## 2022-08-03 NOTE — PROGRESS NOTE ADULT - PROBLEM SELECTOR PLAN 4
hx of polysubstance use (Percocet, Xanax, Marijuana), recent discharge from [facility] after stay for psychosis and lito in the context of substance abuse  - c/w home librium 30mg BID - as prescribed by [facility], d/w psych ok to continue  - serum tox negative, check utox   - denies SI/HI - no indication for 1:1 at this time  - psychiatry to adjust meds for intermittent anxiety  pt want to speak with psych prior to discharge
hx of polysubstance use (Percocet, Xanax, Marijuana), recent discharge from [facility] after stay for psychosis and lito in the context of substance abuse  - appreciate psych input   -cont current dose of Trazadone +mirtazapine as pee psych   waiting for transfer to in drug rehab facility
hx of polysubstance use (Percocet, Xanax, Marijuana), recent discharge from [facility] after stay for psychosis and lito in the context of substance abuse  - appreciate psych input   -cont current dose of Trazadone +mirtazapine as pee psych   waiting for transfer to in drug rehab facility
hx of polysubstance use (Percocet, Xanax, Marijuana), recent discharge from [facility] after stay for psychosis and lito in the context of substance abuse  - c/w home librium 30mg BID - as prescribed by [facility], d/w psych ok to continue  - serum tox negative, check utox   - denies SI/HI - no indication for 1:1 at this time  - psychiatry to adjust meds for intermittent anxiety
hx of polysubstance use (Percocet, Xanax, Marijuana), recent discharge from [facility] after stay for psychosis and lito in the context of substance abuse  - c/w home librium 30mg BID - as prescribed by [facility], d/w psych ok to continue  - serum tox negative, check utox   - denies SI/HI - no indication for 1:1 at this time  - psychiatry consult
hx of polysubstance use (Percocet, Xanax, Marijuana), recent discharge from [facility] after stay for psychosis and lito in the context of substance abuse  - c/w home librium 30mg BID - as prescribed by [facility], d/w psych ok to continue  - serum tox negative, check utox   - denies SI/HI - no indication for 1:1 at this time  - psychiatry consult
hx of polysubstance use (Percocet, Xanax, Marijuana), recent discharge from [facility] after stay for psychosis and lito in the context of substance abuse  - appreciate psych input   -cont current dose of Trazadone +mirtazapine as pee psych   waiting for transfer to in drug rehab facility
hx of polysubstance use (Percocet, Xanax, Marijuana), recent discharge from [facility] after stay for psychosis and lito in the context of substance abuse  - appreciate psych input   -cont current dose of Trazadone +mirtazapine as pee psych   waiting for transfer to in drug rehab facility
hx of polysubstance use (Percocet, Xanax, Marijuana), recent discharge from [facility] after stay for psychosis and lito in the context of substance abuse  - appreciate psych input   -cont current dose of Trazadone +mirtazapine as pee psych   waiting for transfer to in drug rehab facility
hx of polysubstance use (Percocet, Xanax, Marijuana), recent discharge from [facility] after stay for psychosis and lito in the context of substance abuse  - appreciate psych input   -cont current dose of Trazadone +mirtazapine as pee psych   waiting for transfer to in drug rehab facility

## 2022-08-03 NOTE — BH CONSULTATION LIAISON PROGRESS NOTE - NSBHMSEMUSCLE_PSY_A_CORE
Unable to assess
Normal muscle tone/strength
Unable to assess
Normal muscle tone/strength

## 2022-08-04 ENCOUNTER — TRANSCRIPTION ENCOUNTER (OUTPATIENT)
Age: 32
End: 2022-08-04

## 2022-08-04 VITALS
SYSTOLIC BLOOD PRESSURE: 126 MMHG | DIASTOLIC BLOOD PRESSURE: 84 MMHG | OXYGEN SATURATION: 100 % | HEART RATE: 70 BPM | RESPIRATION RATE: 17 BRPM | TEMPERATURE: 98 F

## 2022-08-04 RX ORDER — LIDOCAINE 4 G/100G
1 CREAM TOPICAL
Qty: 0 | Refills: 0 | DISCHARGE
Start: 2022-08-04

## 2022-08-04 RX ORDER — METOPROLOL TARTRATE 50 MG
0.5 TABLET ORAL
Qty: 10 | Refills: 0
Start: 2022-08-04

## 2022-08-04 RX ORDER — TRAZODONE HCL 50 MG
2 TABLET ORAL
Qty: 60 | Refills: 0
Start: 2022-08-04 | End: 2022-09-02

## 2022-08-04 RX ORDER — HYDROXYZINE HCL 10 MG
1 TABLET ORAL
Qty: 15 | Refills: 0
Start: 2022-08-04 | End: 2022-08-08

## 2022-08-04 RX ORDER — NICOTINE POLACRILEX 2 MG
1 GUM BUCCAL
Qty: 140 | Refills: 3
Start: 2022-08-04 | End: 2022-09-28

## 2022-08-04 RX ORDER — MIRTAZAPINE 45 MG/1
1 TABLET, ORALLY DISINTEGRATING ORAL
Qty: 30 | Refills: 0
Start: 2022-08-04 | End: 2022-09-02

## 2022-08-04 RX ORDER — METOPROLOL TARTRATE 50 MG
0.5 TABLET ORAL
Qty: 4 | Refills: 0
Start: 2022-08-04 | End: 2022-08-07

## 2022-08-04 NOTE — DISCHARGE NOTE NURSING/CASE MANAGEMENT/SOCIAL WORK - NSDCPEFALRISK_GEN_ALL_CORE
For information on Fall & Injury Prevention, visit: [email or web address] OR  [email or web address] OR  https://www.cdc.gov/steadi/patient.html

## 2022-08-04 NOTE — PROVIDER CONTACT NOTE (OTHER) - REASON
Patient pacing in hallway, anxious, asking for ativan
Pt received package with medication inside
Refusal of vital signs and Labs to be drawn early in the morning
patient refusing telemetry monitor
patient heart rate elevated
pt refusing 6am librium
Patient anxious, refusing benadryl for insomnia
Pt requesting vital signs and labs drawn when he wakes up
PAC of 160 for 6 seconds

## 2022-08-04 NOTE — PROVIDER CONTACT NOTE (OTHER) - ACTION/TREATMENT ORDERED:
LA Lo ordered melatonin for insomnia and if that does not work benadryl must be given.
ACP made aware and no new orders received. Will continue to monitor.
patient educated to eat slower, possible NG tube if patient continues to vomit
Continue to monitor. ACP made aware. Medications with pharmacy.
Continue to monitor. ACP made aware. Ok to do labs and vitals when pt awakens
Patient refuses vital signs and Labs to be drawn early in the morning. Requests for them to be drawn and vitals taken later in the morning. ACP notified and okay given. Will continue to monitor.
AL Lo ordered 2mg ativan for patient.  RN will continue to monitor.
Provider notified
Will continue to monitor

## 2022-08-04 NOTE — PROVIDER CONTACT NOTE (OTHER) - ASSESSMENT
Patient A&Ox4, anxious and pacing the hallway.  Refusing PRN benadryl for insomnia.  Patient states that benadryl "causes adverse affects" and "makes him feel more restless and more anxious"
Patient refuses vital signs and Labs to be drawn early in the morning. Requests for them to be drawn and vitals taken later in the morning.
patient admitted with polysubstance abuse. patient has an order for librium dose at 6am. patient refusing to take medication as it makes him "restless". ACP Cliff Truong notified of patients refusal
ANABELLE Ramos and primary RN, Aubree Coronel, went to patient and asked patient to open envelope. Pt opened and there was cash along with medications. Pt was asked to hand over medications. Medications brought down to pharmacy. Medications were xanax 1mg and Suboxone. Security called and pt room/ belongings searched. Nicotine gum found in patient belongings. Pt medications with pharmacy at this time
Patient A&Ox4, appears anxious and agitated, pacing in the [place]
Patient noted with a PAC sustaining for 6 seconds of 160 bpm. Patient noted ambulating in the hallway and slightly anxious but asymptomatic. Patient at rest broke and heart rate decreased to 97.
Patient refuses vital signs and Labs to be drawn early in the morning. Requests for them to be drawn and vitals taken later in the morning.
patient noted to regurgitate his food, patient states he is not nauseous but the food gest stuck in his throat and comes up
Pt requesting vital signs and labs to be drawn when he wakes up. Pt does not want to be disturbed

## 2022-08-04 NOTE — CHART NOTE - NSCHARTNOTEFT_GEN_A_CORE
spoke with patients facility [facility] - as per nursing director will directly obtain medications from [facility] pharmacy , In the event they do not receive medications until tomorrow they will supply patient with his prescribed medication they have in stock.  discussed with patient / family / hospital staff

## 2022-08-04 NOTE — PROVIDER CONTACT NOTE (OTHER) - RECOMMENDATIONS
Continue to monitor
Continue to monitor
Patient noted with a PAC sustaining for 6 seconds of 160 bpm. Patient noted ambulating in the hallway and slightly anxious but asymptomatic. ACP made aware and no new orders received.
Provider notified
ACP Cliff Truong notified of patients refusal. Will continue to monitor

## 2022-08-04 NOTE — PROVIDER CONTACT NOTE (OTHER) - SITUATION
Patient pacing in hallway, anxious and mildly agitated, asking for ativan
Opioid Abuse, Syncope
patient heart rate elevated
Patient feeling anxious, pacing hallway.  PRN benadryl ordered for insomnia, patient refusing and stating that it causes adverse affects and "makes him feel more anxious and restless"
Pt ordered food that was delivered at security. ANABELLE Ramos went downstairs to grab food. Along with food was an envelope
patient admitted with polysubstance abuse. patient has an order for 6am librium. patient refusing to take medication as it makes him "restless". Will continue to monitor
Patient refusing telemetry monitor
Pt requesting vital signs and labs drawn when he wakes up

## 2022-08-04 NOTE — PROVIDER CONTACT NOTE (OTHER) - DATE AND TIME:
01-Aug-2022 05:50
01-Aug-2022 21:38
26-Jul-2022 16:47
26-Jul-2022 23:00
04-Aug-2022 07:55
31-Jul-2022 05:15
25-Jul-2022 05:40
27-Jul-2022 23:36
30-Jul-2022 23:00

## 2022-08-04 NOTE — DISCHARGE NOTE NURSING/CASE MANAGEMENT/SOCIAL WORK - NSDCFUADDAPPT_GEN_ALL_CORE_FT
You can make an appointment with Dr. Joshua Fox. Phone number is [phone]. Address [address], [place], [address]

## 2022-08-04 NOTE — DISCHARGE NOTE NURSING/CASE MANAGEMENT/SOCIAL WORK - PATIENT PORTAL LINK FT
You can access the FollowMyHealth Patient Portal offered by [facility] by registering at the following website: [email or web address]. By joining [employer]’s FollowMyHealth portal, you will also be able to view your health information using other applications (apps) compatible with our system.

## 2022-08-04 NOTE — DISCHARGE NOTE NURSING/CASE MANAGEMENT/SOCIAL WORK - NSTOBACCOREFERRAL_GEN_A_NCS
July 17, 2020      [facility]  [address] [address]  Phone: [phone]  Fax: [phone]       Patient: Makenzie Pizarro   YOB: 1962  Date of Visit: 07/17/2020    To Whom It May Concern:    NOHEMI Pizarro  was at [facility] on 07/17/2020. She may return to work/school on 07/18/2020 with no restrictions. If you have any questions or concerns, or if I can be of further assistance, please do not hesitate to contact me.    Sincerely,    Ariela Ramirez LPN     
Patient declined information

## 2022-08-04 NOTE — PROVIDER CONTACT NOTE (OTHER) - BACKGROUND
(Admit Diagnosis) Apnea, not elsewhere classified  (PMH) Opioid abuse, daily use  (Principal DC/DX) Apnea spell  (Problem/DX) Abdominal pain
Opioid Abuse, Syncope
Pt admitted for apnea not elsewhere classified
Pt admitted for apnea, not elsewhere classified
(Admit Diagnosis) Apnea, not elsewhere classified  (PMH) Opioid abuse, daily use  (Principal DC/DX) Apnea spell  (Problem/DX) Abdominal pain
(Admit Diagnosis) Apnea, not elsewhere classified  (PMH) Opioid abuse, daily use  (Principal DC/DX) Apnea spell
patient noted with large gastroparesis

## 2022-09-21 ENCOUNTER — NON-APPOINTMENT (OUTPATIENT)
Age: 32
End: 2022-09-21

## 2022-09-27 NOTE — BH INPATIENT PSYCHIATRY PROGRESS NOTE - MSE UNSTRUCTURED FT
The patient is a cis man wearing street clothes. He is poorly groomed. Eye contact was poor. He continues to speaks at a pressured rate with slurred speech but improved from yesterday. He says his mood is “okay.” Affect is anxious, labile. His thought content was focused on persecutory delusions. Denies SIIP and HIIP. He denies AH and VH. Insight is poor. Judgment is poor. Impulse control was appropriate but likely tenuous given overt persecutory fears.  pilonidal cyst The patient is a cis man wearing street clothes. He is poorly groomed. Eye contact was poor. He continues to speaks at a pressured rate with slurred speech but improved from yesterday. He says his mood is “okay.” Affect is anxious, labile. His thought content was focused on persecutory delusions and wish for discharge. Denies SIIP and HIIP. He denies AH and VH. Insight is poor. Judgment is poor. Impulse control was appropriate but tenuous given overt persecutory fears.

## 2022-10-05 ENCOUNTER — NON-APPOINTMENT (OUTPATIENT)
Age: 32
End: 2022-10-05

## 2022-10-05 ENCOUNTER — APPOINTMENT (OUTPATIENT)
Dept: CARDIOLOGY | Facility: CLINIC | Age: 32
End: 2022-10-05

## 2022-10-05 VITALS
WEIGHT: 187 LBS | DIASTOLIC BLOOD PRESSURE: 80 MMHG | HEART RATE: 101 BPM | OXYGEN SATURATION: 97 % | SYSTOLIC BLOOD PRESSURE: 121 MMHG | BODY MASS INDEX: 21.64 KG/M2 | HEIGHT: 78 IN

## 2022-10-05 DIAGNOSIS — Z78.9 OTHER SPECIFIED HEALTH STATUS: ICD-10-CM

## 2022-10-05 DIAGNOSIS — R00.0 TACHYCARDIA, UNSPECIFIED: ICD-10-CM

## 2022-10-05 DIAGNOSIS — R55 SYNCOPE AND COLLAPSE: ICD-10-CM

## 2022-10-05 DIAGNOSIS — F19.10 OTHER PSYCHOACTIVE SUBSTANCE ABUSE, UNCOMPLICATED: ICD-10-CM

## 2022-10-05 DIAGNOSIS — F17.200 NICOTINE DEPENDENCE, UNSPECIFIED, UNCOMPLICATED: ICD-10-CM

## 2022-10-05 PROCEDURE — 93000 ELECTROCARDIOGRAM COMPLETE: CPT

## 2022-10-05 PROCEDURE — 99204 OFFICE O/P NEW MOD 45 MIN: CPT | Mod: 25

## 2022-10-05 NOTE — DISCUSSION/SUMMARY
[FreeTextEntry1] : In summary,  is a 32-year-old male who had a syncopal episode and sinus tachycardia during a hospitalization in July.  He has no current complaints.  His exam shows regular rhythm at a rate of about 90, normal blood pressure, clear lungs, and a normal cardiac exam.  An EKG is within normal limits.\par \par He has no evidence of heart disease and appears to be in good general health.  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 [EKG obtained to assist in diagnosis and management of assessed problem(s)] : EKG obtained to assist in diagnosis and management of assessed problem(s)

## 2022-10-05 NOTE — HISTORY OF PRESENT ILLNESS
[FreeTextEntry1] : 32-year-old male being seen in cardiac evaluation for clearance to proceed with application to become a .  He was hospitalized at [facility] in July with a syncopal episode in association with a multidrug overdose.  He had no evidence of structural heart disease and an echocardiogram done in the hospital was normal.  He was tachycardic at the time and was given metoprolol to use briefly at the time of discharge.  He has stopped taking the metoprolol shortly after discharge.  He has physically active and has no current complaints.

## 2022-10-20 ENCOUNTER — NON-APPOINTMENT (OUTPATIENT)
Age: 32
End: 2022-10-20

## 2022-12-20 NOTE — PATIENT PROFILE ADULT - FUNCTIONAL ASSESSMENT - BASIC MOBILITY 4.
4 = No assist / stand by assistance 48y/ofemale presents for preop eval for scheduled dilation curettage hysteroscopy.  Pt state LMP 2/2022, recent imaging and biopsy done.  Preop dx abnormal findings on diagnostic imaging of other specified body structure.

## 2023-01-16 NOTE — BH INPATIENT PSYCHIATRY PROGRESS NOTE - MSE UNSTRUCTURED FT
The patient is a cis man wearing street clothes. He is adequately groomed. Eye contact was good. He speaks a bit quickly but overall normative in volume and tone. He says his mood is “better.” Affect is congruent, euthymic, full. His thought content was focused processing the vivid dreams from last week. Denies SIIP and HIIP. He denies AH and VH. Insight is fair. Judgment is fair. Impulse control was appropriate to setting. Burow's Graft Text: The defect edges were debeveled with a #15 scalpel blade.  Given the location of the defect, shape of the defect, the proximity to free margins and the presence of a standing cone deformity a Burow's skin graft was deemed most appropriate. The standing cone was removed and this tissue was then trimmed to the shape of the primary defect. The adipose tissue was also removed until only dermis and epidermis were left.  The skin margins of the secondary defect were undermined to an appropriate distance in all directions utilizing iris scissors.  The secondary defect was closed with interrupted buried subcutaneous sutures.  The skin edges were then re-apposed with running  sutures.  The skin graft was then placed in the primary defect and oriented appropriately.

## 2023-01-18 NOTE — ED PROVIDER NOTE - CHIEF COMPLAINT
[FreeTextEntry1] : Maternal cousin and Paternal uncle unknown mental illness.  The patient is a 32y Male complaining of psychiatric evaluation.

## 2023-01-30 ENCOUNTER — APPOINTMENT (OUTPATIENT)
Dept: INTERNAL MEDICINE | Facility: CLINIC | Age: 33
End: 2023-01-30
Payer: MEDICAID

## 2023-01-30 ENCOUNTER — NON-APPOINTMENT (OUTPATIENT)
Age: 33
End: 2023-01-30

## 2023-01-30 VITALS
SYSTOLIC BLOOD PRESSURE: 108 MMHG | BODY MASS INDEX: 22.56 KG/M2 | HEART RATE: 92 BPM | DIASTOLIC BLOOD PRESSURE: 72 MMHG | HEIGHT: 78 IN | TEMPERATURE: 98.8 F | OXYGEN SATURATION: 97 % | WEIGHT: 195 LBS

## 2023-01-30 DIAGNOSIS — Z00.00 ENCOUNTER FOR GENERAL ADULT MEDICAL EXAMINATION W/OUT ABNORMAL FINDINGS: ICD-10-CM

## 2023-01-30 DIAGNOSIS — M25.511 PAIN IN RIGHT SHOULDER: ICD-10-CM

## 2023-01-30 DIAGNOSIS — Z11.3 ENCOUNTER FOR SCREENING FOR INFECTIONS WITH A PREDOMINANTLY SEXUAL MODE OF TRANSMISSION: ICD-10-CM

## 2023-01-30 DIAGNOSIS — Z82.5 FAMILY HISTORY OF ASTHMA AND OTHER CHRONIC LOWER RESPIRATORY DISEASES: ICD-10-CM

## 2023-01-30 DIAGNOSIS — J06.9 ACUTE UPPER RESPIRATORY INFECTION, UNSPECIFIED: ICD-10-CM

## 2023-01-30 PROCEDURE — 99385 PREV VISIT NEW AGE 18-39: CPT | Mod: 25

## 2023-01-30 PROCEDURE — G0444 DEPRESSION SCREEN ANNUAL: CPT | Mod: 59

## 2023-01-30 RX ORDER — FLUTICASONE PROPIONATE 50 UG/1
50 SPRAY, METERED NASAL
Qty: 1 | Refills: 0 | Status: ACTIVE | COMMUNITY
Start: 2023-01-30 | End: 1900-01-01

## 2023-01-30 RX ORDER — BENZONATATE 100 MG/1
100 CAPSULE ORAL 3 TIMES DAILY
Qty: 21 | Refills: 0 | Status: ACTIVE | COMMUNITY
Start: 2023-01-30 | End: 1900-01-01

## 2023-01-30 NOTE — HISTORY OF PRESENT ILLNESS
[FreeTextEntry1] : cpe/est care [de-identified] : SO ACEVEDO is a 33 year M who presents for CPE/est care\par PMHx none\par Has not seen a dr in a long time \par Feels well overall \par Pain in R shoulder for a couple of months. States he attributed it to pulling a muscle while working out but it has not gotten better. Reports he has continued to work out despite pain. Has not tried any medications. \par Reports cold symptoms for the past few days, states he has a headache and mild cough, but denies, fever, chills, SOB, sore throat, n/v/d. States he tested negative for covid 1 week (routinely) but has not tested since start of symptoms.\par Flu vaccine- 11/2022\par Covid vaccine- s/p 3 doses\par Tdap- unsure

## 2023-01-30 NOTE — PHYSICAL EXAM
[No Acute Distress] : no acute distress [Normal Sclera/Conjunctiva] : normal sclera/conjunctiva [EOMI] : extraocular movements intact [Normal Outer Ear/Nose] : the outer ears and nose were normal in appearance [Normal Oropharynx] : the oropharynx was normal [Normal TMs] : both tympanic membranes were normal [No Lymphadenopathy] : no lymphadenopathy [Supple] : supple [Thyroid Normal, No Nodules] : the thyroid was normal and there were no nodules present [No Respiratory Distress] : no respiratory distress  [No Accessory Muscle Use] : no accessory muscle use [Clear to Auscultation] : lungs were clear to auscultation bilaterally [Normal Rate] : normal rate  [Regular Rhythm] : with a regular rhythm [Normal S1, S2] : normal S1 and S2 [No Edema] : there was no peripheral edema [No Extremity Clubbing/Cyanosis] : no extremity clubbing/cyanosis [Soft] : abdomen soft [Non Tender] : non-tender [Non-distended] : non-distended [Normal Bowel Sounds] : normal bowel sounds [Coordination Grossly Intact] : coordination grossly intact [No Focal Deficits] : no focal deficits [Normal Gait] : normal gait [Normal Affect] : the affect was normal [Normal Insight/Judgement] : insight and judgment were intact [de-identified] : R shoulder ROM intact though endorses pain with IR and ER

## 2023-01-30 NOTE — ASSESSMENT
[FreeTextEntry1] : HCM\par -well visit\par -routine labs- drawn in house\par -flu shot- utd\par -RTC in 1 year or sooner prn\par \par URI\par -sx mild\par -advised to test for covid\par -rx given for flonase and tessalon perles\par -if worsening of sx, go to UC\par \par R shoulder pain\par -likely msk, exacerbated by continuous exercise\par -advised to rest for 2 weeks\par -trial nsaid or tylenol\par -heat or ice\par -return if persistent pain despite conservative measures \par

## 2023-01-30 NOTE — HEALTH RISK ASSESSMENT
[Current] : Current [No] : In the past 12 months have you used drugs other than those required for medical reasons? No [0] : 2) Feeling down, depressed, or hopeless: Not at all (0) [PHQ-2 Negative - No further assessment needed] : PHQ-2 Negative - No further assessment needed [Alone] : lives alone [Unemployed] : unemployed [Single] : single [Sexually Active] : sexually active [Feels Safe at Home] : Feels safe at home [Fully functional (bathing, dressing, toileting, transferring, walking, feeding)] : Fully functional (bathing, dressing, toileting, transferring, walking, feeding) [Fully functional (using the telephone, shopping, preparing meals, housekeeping, doing laundry, using] : Fully functional and needs no help or supervision to perform IADLs (using the telephone, shopping, preparing meals, housekeeping, doing laundry, using transportation, managing medications and managing finances) [de-identified] : <half pack per day, for the past 10 years [[id number]] : 0 [de-identified] : 1 female partner, occasionally uses protection

## 2023-01-30 NOTE — REVIEW OF SYSTEMS
[Earache] : no earache [Postnasal Drip] : no postnasal drip [Nasal Discharge] : nasal discharge [Sore Throat] : no sore throat [Shortness Of Breath] : no shortness of breath [Wheezing] : no wheezing [Cough] : cough [Headache] : headache [Negative] : Heme/Lymph [FreeTextEntry9] : R shoulder pain

## 2023-02-03 ENCOUNTER — NON-APPOINTMENT (OUTPATIENT)
Age: 33
End: 2023-02-03

## 2023-02-06 LAB
25(OH)D3 SERPL-MCNC: 10.9 NG/ML
ALBUMIN SERPL ELPH-MCNC: 4.4 G/DL
ALP BLD-CCNC: 79 U/L
ALT SERPL-CCNC: 33 U/L
ANION GAP SERPL CALC-SCNC: 15 MMOL/L
AST SERPL-CCNC: 72 U/L
BASOPHILS # BLD AUTO: 0.02 K/UL
BASOPHILS NFR BLD AUTO: 0.6 %
BILIRUB SERPL-MCNC: 0.4 MG/DL
BUN SERPL-MCNC: 9 MG/DL
C TRACH RRNA SPEC QL NAA+PROBE: NOT DETECTED
CALCIUM SERPL-MCNC: 9.5 MG/DL
CHLORIDE SERPL-SCNC: 102 MMOL/L
CHOLEST SERPL-MCNC: 130 MG/DL
CO2 SERPL-SCNC: 22 MMOL/L
CREAT SERPL-MCNC: 1.12 MG/DL
EGFR: 88 ML/MIN/1.73M2
EOSINOPHIL # BLD AUTO: 0.2 K/UL
EOSINOPHIL NFR BLD AUTO: 5.7 %
ESTIMATED AVERAGE GLUCOSE: 108 MG/DL
GLUCOSE SERPL-MCNC: 107 MG/DL
HAV IGM SER QL: NONREACTIVE
HBA1C MFR BLD HPLC: 5.4 %
HBV CORE IGM SER QL: NONREACTIVE
HBV SURFACE AG SER QL: NONREACTIVE
HCT VFR BLD CALC: 43.2 %
HCV AB SER QL: NONREACTIVE
HCV S/CO RATIO: 0.07 S/CO
HDLC SERPL-MCNC: 41 MG/DL
HGB BLD-MCNC: 14 G/DL
HIV1+2 AB SPEC QL IA.RAPID: NONREACTIVE
IMM GRANULOCYTES NFR BLD AUTO: 0 %
LDLC SERPL CALC-MCNC: 72 MG/DL
LYMPHOCYTES # BLD AUTO: 0.68 K/UL
LYMPHOCYTES NFR BLD AUTO: 19.4 %
MAN DIFF?: NORMAL
MCHC RBC-ENTMCNC: 29.5 PG
MCHC RBC-ENTMCNC: 32.4 GM/DL
MCV RBC AUTO: 91.1 FL
MONOCYTES # BLD AUTO: 0.41 K/UL
MONOCYTES NFR BLD AUTO: 11.7 %
N GONORRHOEA RRNA SPEC QL NAA+PROBE: NOT DETECTED
NEUTROPHILS # BLD AUTO: 2.2 K/UL
NEUTROPHILS NFR BLD AUTO: 62.6 %
NONHDLC SERPL-MCNC: 89 MG/DL
PLATELET # BLD AUTO: 278 K/UL
POTASSIUM SERPL-SCNC: 4.3 MMOL/L
PROT SERPL-MCNC: 6.8 G/DL
RBC # BLD: 4.74 M/UL
RBC # FLD: 13.2 %
SODIUM SERPL-SCNC: 140 MMOL/L
SOURCE AMPLIFICATION: NORMAL
T PALLIDUM AB SER QL IA: NEGATIVE
TRIGL SERPL-MCNC: 86 MG/DL
TSH SERPL-ACNC: 1.35 UIU/ML
WBC # FLD AUTO: 3.51 K/UL

## 2023-02-16 ENCOUNTER — APPOINTMENT (OUTPATIENT)
Dept: INTERNAL MEDICINE | Facility: CLINIC | Age: 33
End: 2023-02-16

## 2023-02-19 NOTE — ED ADULT NURSE NOTE - MUSCULOSKELETAL WDL
Prep Survey    Flowsheet Row Responses   [facility] facility patient discharged from? [facility]   Is LACE score < 7 ? No   Eligibility Readm Mgmt   Discharge diagnosis Hyperkalemia, POA, resolved   Does the patient have one of the following disease processes/diagnoses(primary or secondary)? Other   Does the patient have Home health ordered? No   Is there a DME ordered? No   Prep survey completed? Yes          Alexus MARIN - Registered Nurse         Full range of motion of upper and lower extremities, no joint tenderness/swelling.

## 2023-10-13 NOTE — BH CONSULTATION LIAISON PROGRESS NOTE - NSBHCONSULTSUBSTANCEOPIATES_PSY_A_CORE FT
-- DO NOT REPLY / DO NOT REPLY ALL --  -- Message is from Engagement Center Operations (ECO) --    ONLY TO BE USED WITHIN A REFILL MEDICATION ENCOUNTER    Med Refill  Is the patient currently having any symptoms?: Emergent-close Medication Refill encounter and initiate Nurse Triage workflow for Emergent Symptoms    Name of medication requested: See pended med    Has patient contacted the pharmacy? Yes    Is this the first request for the medication in the last 48 hours?: Yes      Patient is requesting a medication refill - medication is on active list      Full name of the provider who ordered the medication: Dr Reece, [facility] site name / Account # for provider:[facility] - [address]     Preferred Pharmacy: Pharmacy  [id number] [address] [facility] [address]    Patient confirmed the above pharmacy as correct?  Yes          Alternative phone number: NA    Can a detailed message be left?: Yes    Patient is completely out of medication: Verify if patient is currently experiencing symptoms. If patient is symptomatic, proceed with front end triage instead of medication refill. If patient is not symptomatic but is completely out of medication, shawn as High priority when routing. Inform patient: “Please call back with any questions or concerns and if your condition becomes life threatening, you should seek immediate medical assistance by calling 911 or going to the Emergency Department for evaluation.”    Inform all patients: \"If the clinical team needs to contact you regarding this refill, please be aware the return phone call may come from an unidentified or out of state phone number and your refill request will be addressed as soon as the clinical team reviews your message.\"   CINA protocol

## 2024-05-31 ENCOUNTER — NON-APPOINTMENT (OUTPATIENT)
Age: 34
End: 2024-05-31

## 2024-09-09 ENCOUNTER — NON-APPOINTMENT (OUTPATIENT)
Age: 34
End: 2024-09-09

## 2025-02-17 ENCOUNTER — EMERGENCY (EMERGENCY)
Facility: HOSPITAL | Age: 35
LOS: 1 days | Discharge: HOME | End: 2025-02-17
Admitting: EMERGENCY MEDICINE
Payer: COMMERCIAL

## 2025-02-17 VITALS
RESPIRATION RATE: 18 BRPM | HEIGHT: 78 IN | HEART RATE: 93 BPM | TEMPERATURE: 97 F | OXYGEN SATURATION: 99 % | WEIGHT: 190.04 LBS | DIASTOLIC BLOOD PRESSURE: 89 MMHG | SYSTOLIC BLOOD PRESSURE: 146 MMHG

## 2025-02-17 LAB — HIV 1+2 AB+HIV1 P24 AG SERPL QL IA: SIGNIFICANT CHANGE UP

## 2025-02-17 PROCEDURE — 99284 EMERGENCY DEPT VISIT MOD MDM: CPT | Mod: 25

## 2025-02-17 PROCEDURE — 12013 RPR F/E/E/N/L/M 2.6-5.0 CM: CPT

## 2025-02-17 RX ORDER — CLOSTRIDIUM TETANI TOXOID ANTIGEN (FORMALDEHYDE INACTIVATED), CORYNEBACTERIUM DIPHTHERIAE TOXOID ANTIGEN (FORMALDEHYDE INACTIVATED), BORDETELLA PERTUSSIS TOXOID ANTIGEN (GLUTARALDEHYDE INACTIVATED), BORDETELLA PERTUSSIS FILAMENTOUS HEMAGGLUTININ ANTIGEN (FORMALDEHYDE INACTIVATED), BORDETELLA PERTUSSIS PERTACTIN ANTIGEN, AND BORDETELLA PERTUSSIS FIMBRIAE 2/3 ANTIGEN 5; 2; 2.5; 5; 3; 5 [LF]/.5ML; [LF]/.5ML; UG/.5ML; UG/.5ML; UG/.5ML; UG/.5ML
0.5 INJECTION, SUSPENSION INTRAMUSCULAR ONCE
Refills: 0 | Status: COMPLETED | OUTPATIENT
Start: 2025-02-17 | End: 2025-02-17

## 2025-02-17 RX ADMIN — CLOSTRIDIUM TETANI TOXOID ANTIGEN (FORMALDEHYDE INACTIVATED), CORYNEBACTERIUM DIPHTHERIAE TOXOID ANTIGEN (FORMALDEHYDE INACTIVATED), BORDETELLA PERTUSSIS TOXOID ANTIGEN (GLUTARALDEHYDE INACTIVATED), BORDETELLA PERTUSSIS FILAMENTOUS HEMAGGLUTININ ANTIGEN (FORMALDEHYDE INACTIVATED), BORDETELLA PERTUSSIS PERTACTIN ANTIGEN, AND BORDETELLA PERTUSSIS FIMBRIAE 2/3 ANTIGEN 0.5 MILLILITER(S): 5; 2; 2.5; 5; 3; 5 INJECTION, SUSPENSION INTRAMUSCULAR at 17:04
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
## 2025-02-17 NOTE — ED PROVIDER NOTE - NSFOLLOWUPINSTRUCTIONS_ED_ALL_ED_FT
Rest, take ibuprofen or tylenol as needed for pain, your tetanus shot was updated. Leave the Dermabond in place, keep it dry for 24 hours, do not pick at it or apply bacitracin as it can break down the Dermabond. Advance activity as tolerated.  Continue all previously prescribed medications as directed.  Follow up with your primary care physician in 48-72 hours- bring copies of your results.  Return to the ER for worsening or persistent symptoms, and/or ANY NEW OR CONCERNING SYMPTOMS. THIS INCLUDES BUT IS NOT LIMITED TO NAUSEA, VOMITING, SEVERE OR SUDDEN HEADACHE OR FOR ANY OTHER SYMPTOMS THAT CONCERN YOU. If you have issues obtaining follow up, please call: [phone] to obtain a doctor or specialist who takes your insurance in your area.  You may call [phone] to make an appointment with the internal medicine clinic. Rest, take ibuprofen or Tylenol as needed for pain, your tetanus shot was updated. Leave the Dermabond in place, keep it dry for 24 hours, do not pick at it or apply bacitracin as it can break down the Dermabond. Advance activity as tolerated.  Continue all previously prescribed medications as directed.  Follow up with your primary care physician in 48-72 hours- bring copies of your results.  Return to the ER for worsening or persistent symptoms, and/or ANY NEW OR CONCERNING SYMPTOMS. THIS INCLUDES BUT IS NOT LIMITED TO NAUSEA, VOMITING, SEVERE OR SUDDEN HEADACHE OR FOR ANY OTHER SYMPTOMS THAT CONCERN YOU. If you have issues obtaining follow up, please call: [phone] to obtain a doctor or specialist who takes your insurance in your area.  You may call [phone] to make an appointment with the internal medicine clinic.
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
## 2025-02-17 NOTE — ED PROVIDER NOTE - PHYSICAL EXAMINATION
A comprehensive trauma exam was performed. No skull tenderness or deformity, no tenderness or abnormality of facial bones. 2 Superficial lacerations to the forehead in parallel, No active bleeding. No spinous process or paraspinal muscle tenderness of the cervical, thoracic or lumbar spine. No rib tenderness/crepitus. Abdomen is soft, non-tender no guarding. Neg Td's Sign, Neg Freeman Spencer's Sign. Pelvis is stable. No tenderness or timbo deformity of upper or lower extremity, no signs of dislocation, no scaphoid tenderness. No other traumatic abnormality on comprehensive exam.
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
## 2025-02-17 NOTE — ED PROVIDER NOTE - PATIENT PORTAL LINK FT
You can access the FollowMyHealth Patient Portal offered by [facility] by registering at the following website: [email or web address]. By joining [facility]’s FollowMyHealth portal, you will also be able to view your health information using other applications (apps) compatible with our system.
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
## 2025-02-18 LAB
C TRACH RRNA SPEC QL NAA+PROBE: SIGNIFICANT CHANGE UP
HAV IGM SER-ACNC: SIGNIFICANT CHANGE UP
HBV CORE IGM SER-ACNC: SIGNIFICANT CHANGE UP
HBV SURFACE AG SER-ACNC: SIGNIFICANT CHANGE UP
HCV AB S/CO SERPL IA: 0.12 S/CO — SIGNIFICANT CHANGE UP (ref 0–0.99)
HCV AB SERPL-IMP: SIGNIFICANT CHANGE UP
N GONORRHOEA RRNA SPEC QL NAA+PROBE: SIGNIFICANT CHANGE UP
SPECIMEN SOURCE: SIGNIFICANT CHANGE UP
T PALLIDUM AB TITR SER: NEGATIVE — SIGNIFICANT CHANGE UP

## 2025-05-23 ENCOUNTER — EMERGENCY (EMERGENCY)
Facility: HOSPITAL | Age: 35
LOS: 1 days | End: 2025-05-23
Admitting: EMERGENCY MEDICINE
Payer: OTHER MISCELLANEOUS

## 2025-05-23 VITALS
WEIGHT: 179.9 LBS | SYSTOLIC BLOOD PRESSURE: 124 MMHG | RESPIRATION RATE: 16 BRPM | DIASTOLIC BLOOD PRESSURE: 86 MMHG | HEART RATE: 85 BPM | OXYGEN SATURATION: 97 % | TEMPERATURE: 98 F

## 2025-05-23 PROCEDURE — 99053 MED SERV 10PM-8AM 24 HR FAC: CPT

## 2025-05-23 PROCEDURE — 99284 EMERGENCY DEPT VISIT MOD MDM: CPT

## 2025-05-23 RX ORDER — CLOSTRIDIUM TETANI TOXOID ANTIGEN (FORMALDEHYDE INACTIVATED), CORYNEBACTERIUM DIPHTHERIAE TOXOID ANTIGEN (FORMALDEHYDE INACTIVATED), BORDETELLA PERTUSSIS TOXOID ANTIGEN (GLUTARALDEHYDE INACTIVATED), BORDETELLA PERTUSSIS FILAMENTOUS HEMAGGLUTININ ANTIGEN (FORMALDEHYDE INACTIVATED), BORDETELLA PERTUSSIS PERTACTIN ANTIGEN, AND BORDETELLA PERTUSSIS FIMBRIAE 2/3 ANTIGEN 5; 2; 2.5; 5; 3; 5 [LF]/.5ML; [LF]/.5ML; UG/.5ML; UG/.5ML; UG/.5ML; UG/.5ML
0.5 INJECTION, SUSPENSION INTRAMUSCULAR ONCE
Refills: 0 | Status: COMPLETED | OUTPATIENT
Start: 2025-05-23 | End: 2025-05-23

## 2025-05-23 RX ORDER — DOXYCYCLINE HYCLATE 100 MG
1 TABLET ORAL
Qty: 14 | Refills: 0
Start: 2025-05-23

## 2025-05-23 RX ORDER — DOXYCYCLINE HYCLATE 100 MG
100 TABLET ORAL ONCE
Refills: 0 | Status: COMPLETED | OUTPATIENT
Start: 2025-05-23 | End: 2025-05-23

## 2025-05-23 RX ADMIN — Medication 100 MILLIGRAM(S): at 06:35

## 2025-05-23 RX ADMIN — CLOSTRIDIUM TETANI TOXOID ANTIGEN (FORMALDEHYDE INACTIVATED), CORYNEBACTERIUM DIPHTHERIAE TOXOID ANTIGEN (FORMALDEHYDE INACTIVATED), BORDETELLA PERTUSSIS TOXOID ANTIGEN (GLUTARALDEHYDE INACTIVATED), BORDETELLA PERTUSSIS FILAMENTOUS HEMAGGLUTININ ANTIGEN (FORMALDEHYDE INACTIVATED), BORDETELLA PERTUSSIS PERTACTIN ANTIGEN, AND BORDETELLA PERTUSSIS FIMBRIAE 2/3 ANTIGEN 0.5 MILLILITER(S): 5; 2; 2.5; 5; 3; 5 INJECTION, SUSPENSION INTRAMUSCULAR at 06:35
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
## 2025-05-23 NOTE — ED PROVIDER NOTE - PATIENT PORTAL LINK FT
You can access the FollowMyHealth Patient Portal offered by  by registering at the following website: [email or web address]. By joining [facility]’s FollowMyHealth portal, you will also be able to view your health information using other applications (apps) compatible with our system.

## 2025-05-23 NOTE — ED PROVIDER NOTE - NSFOLLOWUPINSTRUCTIONS_ED_ALL_ED_FT
PUNCTURE WOUND - Ambulatory Care    Puncture Wound    AMBULATORY CARE:    A puncture wound is a hole in the skin made by a sharp, pointed object. The area may be bruised or swollen. You may have bleeding, pain, or trouble moving the affected area.  Puncture Wound    Seek care immediately if:    You have severe pain.    You have numbness or tingling in the area of your wound.    Your wound starts bleeding and does not stop, even after you apply pressure.  Call your doctor if:    You have new drainage or a bad odor coming from the wound.    You have a fever or chills.    You have increased swelling, redness, or pain.    You have red streaks on your skin coming from your wound.    You have questions or concerns about your condition or care.  Medicines: You may need any of the following:    NSAIDs, such as ibuprofen, help decrease swelling, pain, and fever. This medicine is available with or without a doctor's order. NSAIDs can cause stomach bleeding or kidney problems in certain people. If you take blood thinner medicine, always ask your healthcare provider if NSAIDs are safe for you. Always read the medicine label and follow directions.    Antibiotics help prevent a bacterial infection.    Take your medicine as directed. Contact your healthcare provider if you think your medicine is not helping or if you have side effects. Tell your provider if you are allergic to any medicine. Keep a list of the medicines, vitamins, and herbs you take. Include the amounts, and when and why you take them. Bring the list or the pill bottles to follow-up visits. Carry your medicine list with you in case of an emergency.  Care for your wound as directed: Keep your wound clean and dry. When you are allowed to bathe, carefully wash the wound with soap and water. Dry the area and put on new, clean bandages as directed. Change your bandages when they get wet or dirty.    Rest and elevate the injured area above the level of your heart as often as you can. This will help decrease swelling and pain. Prop your injured area on pillows or blankets to keep it elevated comfortably.    Follow up with your doctor in 2 to 3 days: Write down your questions so you remember to ask them during your visits.
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
## 2025-05-23 NOTE — ED PROVIDER NOTE - SKIN, MLM
left lemm puncture wound to the posterior aspect of leg. wound not blooding, no gross contamination. no surrounding erythema or acute signs of infection. Skin normal color for race, warm, dry and intact. No evidence of rash.
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
## 2025-07-03 ENCOUNTER — NON-APPOINTMENT (OUTPATIENT)
Age: 35
End: 2025-07-03

## 2025-07-15 ENCOUNTER — APPOINTMENT (OUTPATIENT)
Dept: ORTHOPEDIC SURGERY | Facility: CLINIC | Age: 35
End: 2025-07-15
Payer: OTHER MISCELLANEOUS

## 2025-07-15 PROCEDURE — 99203 OFFICE O/P NEW LOW 30 MIN: CPT

## 2025-07-15 PROCEDURE — 72082 X-RAY EXAM ENTIRE SPI 2/3 VW: CPT

## 2025-07-15 RX ORDER — DICLOFENAC SODIUM 75 MG/1
75 TABLET, DELAYED RELEASE ORAL
Qty: 40 | Refills: 1 | Status: ACTIVE | COMMUNITY
Start: 2025-07-15 | End: 1900-01-01

## 2025-07-15 RX ORDER — TIZANIDINE 2 MG/1
2 TABLET ORAL EVERY 6 HOURS
Qty: 56 | Refills: 1 | Status: ACTIVE | COMMUNITY
Start: 2025-07-15 | End: 1900-01-01

## 2025-07-29 ENCOUNTER — APPOINTMENT (OUTPATIENT)
Dept: ORTHOPEDIC SURGERY | Facility: CLINIC | Age: 35
End: 2025-07-29
Payer: OTHER MISCELLANEOUS

## 2025-07-29 DIAGNOSIS — M54.50 LOW BACK PAIN, UNSPECIFIED: ICD-10-CM

## 2025-07-29 PROCEDURE — 99213 OFFICE O/P EST LOW 20 MIN: CPT

## 2025-07-29 RX ORDER — TIZANIDINE 2 MG/1
2 TABLET ORAL EVERY 6 HOURS
Qty: 56 | Refills: 1 | Status: ACTIVE | COMMUNITY
Start: 2025-07-29 | End: 1900-01-01